# Patient Record
Sex: MALE | Race: WHITE | URBAN - NONMETROPOLITAN AREA
[De-identification: names, ages, dates, MRNs, and addresses within clinical notes are randomized per-mention and may not be internally consistent; named-entity substitution may affect disease eponyms.]

---

## 2017-01-31 ENCOUNTER — OFFICE VISIT (OUTPATIENT)
Dept: GASTROENTEROLOGY | Age: 77
End: 2017-01-31
Payer: COMMERCIAL

## 2017-01-31 VITALS
WEIGHT: 203.4 LBS | HEIGHT: 71 IN | SYSTOLIC BLOOD PRESSURE: 118 MMHG | HEART RATE: 77 BPM | DIASTOLIC BLOOD PRESSURE: 76 MMHG | OXYGEN SATURATION: 98 % | BODY MASS INDEX: 28.48 KG/M2

## 2017-01-31 DIAGNOSIS — Z85.46 HISTORY OF PROSTATE CANCER: Primary | ICD-10-CM

## 2017-01-31 DIAGNOSIS — Z80.0 FAMILY HISTORY OF ESOPHAGEAL CANCER: ICD-10-CM

## 2017-01-31 PROCEDURE — 99202 OFFICE O/P NEW SF 15 MIN: CPT | Performed by: NURSE PRACTITIONER

## 2017-01-31 RX ORDER — DOXYCYCLINE HYCLATE 50 MG/1
50 CAPSULE ORAL 2 TIMES DAILY
COMMUNITY

## 2017-01-31 RX ORDER — FENOFIBRATE 145 MG/1
145 TABLET, COATED ORAL DAILY
COMMUNITY

## 2017-01-31 RX ORDER — HYDROCHLOROTHIAZIDE 25 MG/1
25 TABLET ORAL DAILY
COMMUNITY

## 2017-01-31 RX ORDER — LISINOPRIL 20 MG/1
20 TABLET ORAL DAILY
COMMUNITY

## 2017-01-31 ASSESSMENT — ENCOUNTER SYMPTOMS
CONSTIPATION: 0
NAUSEA: 0
ABDOMINAL PAIN: 0
VOMITING: 0
BLOOD IN STOOL: 0
TROUBLE SWALLOWING: 0
DIARRHEA: 0
COUGH: 0
RECTAL PAIN: 0
CHEST TIGHTNESS: 0

## 2017-04-09 ENCOUNTER — HOSPITAL ENCOUNTER (EMERGENCY)
Facility: HOSPITAL | Age: 77
Discharge: HOME | End: 2017-04-09
Attending: EMERGENCY MEDICINE | Admitting: EMERGENCY MEDICINE

## 2017-04-09 ENCOUNTER — APPOINTMENT (OUTPATIENT)
Dept: CT IMAGING | Facility: HOSPITAL | Age: 77
End: 2017-04-09

## 2017-04-09 ENCOUNTER — APPOINTMENT (OUTPATIENT)
Dept: GENERAL RADIOLOGY | Facility: HOSPITAL | Age: 77
End: 2017-04-09

## 2017-04-09 VITALS
HEART RATE: 69 BPM | WEIGHT: 196 LBS | DIASTOLIC BLOOD PRESSURE: 96 MMHG | SYSTOLIC BLOOD PRESSURE: 138 MMHG | HEIGHT: 71 IN | RESPIRATION RATE: 16 BRPM | OXYGEN SATURATION: 96 % | TEMPERATURE: 97.6 F | BODY MASS INDEX: 27.44 KG/M2

## 2017-04-09 DIAGNOSIS — I10 ESSENTIAL HYPERTENSION: ICD-10-CM

## 2017-04-09 DIAGNOSIS — M19.012 OSTEOARTHRITIS OF LEFT SHOULDER, UNSPECIFIED OSTEOARTHRITIS TYPE: Primary | ICD-10-CM

## 2017-04-09 LAB
ALBUMIN SERPL-MCNC: 4.3 G/DL (ref 3.5–5)
ALBUMIN/GLOB SERPL: 1.4 G/DL (ref 1.1–2.5)
ALP SERPL-CCNC: 54 U/L (ref 24–120)
ALT SERPL W P-5'-P-CCNC: 22 U/L (ref 0–54)
ANION GAP SERPL CALCULATED.3IONS-SCNC: 15 MMOL/L (ref 4–13)
APTT PPP: 32.7 SECONDS (ref 24.1–34.8)
AST SERPL-CCNC: 36 U/L (ref 7–45)
BASOPHILS # BLD AUTO: 0.03 10*3/MM3 (ref 0–0.2)
BASOPHILS NFR BLD AUTO: 0.4 % (ref 0–2)
BILIRUB SERPL-MCNC: 0.6 MG/DL (ref 0.1–1)
BUN BLD-MCNC: 21 MG/DL (ref 5–21)
BUN/CREAT SERPL: 19.1 (ref 7–25)
CALCIUM SPEC-SCNC: 9.8 MG/DL (ref 8.4–10.4)
CHLORIDE SERPL-SCNC: 98 MMOL/L (ref 98–110)
CK MB SERPL-CCNC: 4.15 NG/ML (ref 0–5)
CO2 SERPL-SCNC: 26 MMOL/L (ref 24–31)
CREAT BLD-MCNC: 1.1 MG/DL (ref 0.5–1.4)
D DIMER PPP FEU-MCNC: 0.32 MG/L (FEU) (ref 0–0.5)
DEPRECATED RDW RBC AUTO: 39.1 FL (ref 40–54)
EOSINOPHIL # BLD AUTO: 0.12 10*3/MM3 (ref 0–0.7)
EOSINOPHIL NFR BLD AUTO: 1.8 % (ref 0–4)
ERYTHROCYTE [DISTWIDTH] IN BLOOD BY AUTOMATED COUNT: 13.1 % (ref 12–15)
GFR SERPL CREATININE-BSD FRML MDRD: 65 ML/MIN/1.73
GLOBULIN UR ELPH-MCNC: 3.1 GM/DL
GLUCOSE BLD-MCNC: 109 MG/DL (ref 70–100)
HCT VFR BLD AUTO: 41.5 % (ref 40–52)
HGB BLD-MCNC: 14.8 G/DL (ref 14–18)
HOLD SPECIMEN: NORMAL
HOLD SPECIMEN: NORMAL
IMM GRANULOCYTES # BLD: 0.05 10*3/MM3 (ref 0–0.03)
IMM GRANULOCYTES NFR BLD: 0.7 % (ref 0–5)
INR PPP: 0.84 (ref 0.91–1.09)
LYMPHOCYTES # BLD AUTO: 2.34 10*3/MM3 (ref 0.72–4.86)
LYMPHOCYTES NFR BLD AUTO: 34.9 % (ref 15–45)
MAGNESIUM SERPL-MCNC: 1.6 MG/DL (ref 1.4–2.2)
MCH RBC QN AUTO: 30.1 PG (ref 28–32)
MCHC RBC AUTO-ENTMCNC: 35.7 G/DL (ref 33–36)
MCV RBC AUTO: 84.3 FL (ref 82–95)
MONOCYTES # BLD AUTO: 0.51 10*3/MM3 (ref 0.19–1.3)
MONOCYTES NFR BLD AUTO: 7.6 % (ref 4–12)
MYOGLOBIN SERPL-MCNC: 54.2 NG/ML (ref 0–110)
NEUTROPHILS # BLD AUTO: 3.65 10*3/MM3 (ref 1.87–8.4)
NEUTROPHILS NFR BLD AUTO: 54.6 % (ref 39–78)
NT-PROBNP SERPL-MCNC: 45.9 PG/ML (ref 0–1800)
PLATELET # BLD AUTO: 218 10*3/MM3 (ref 130–400)
PMV BLD AUTO: 11 FL (ref 6–12)
POTASSIUM BLD-SCNC: 4 MMOL/L (ref 3.5–5.3)
PROT SERPL-MCNC: 7.4 G/DL (ref 6.3–8.7)
PROTHROMBIN TIME: 11.8 SECONDS (ref 11.9–14.6)
RBC # BLD AUTO: 4.92 10*6/MM3 (ref 4.8–5.9)
SODIUM BLD-SCNC: 139 MMOL/L (ref 135–145)
TROPONIN I SERPL-MCNC: 0 NG/ML (ref 0–0.07)
TROPONIN I SERPL-MCNC: <0.012 NG/ML (ref 0–0.03)
WBC NRBC COR # BLD: 6.7 10*3/MM3 (ref 4.8–10.8)
WHOLE BLOOD HOLD SPECIMEN: NORMAL
WHOLE BLOOD HOLD SPECIMEN: NORMAL

## 2017-04-09 PROCEDURE — 85610 PROTHROMBIN TIME: CPT | Performed by: EMERGENCY MEDICINE

## 2017-04-09 PROCEDURE — 85025 COMPLETE CBC W/AUTO DIFF WBC: CPT | Performed by: EMERGENCY MEDICINE

## 2017-04-09 PROCEDURE — 70450 CT HEAD/BRAIN W/O DYE: CPT

## 2017-04-09 PROCEDURE — 80053 COMPREHEN METABOLIC PANEL: CPT | Performed by: EMERGENCY MEDICINE

## 2017-04-09 PROCEDURE — 93005 ELECTROCARDIOGRAM TRACING: CPT | Performed by: EMERGENCY MEDICINE

## 2017-04-09 PROCEDURE — 71010 HC CHEST PA OR AP: CPT

## 2017-04-09 PROCEDURE — 83880 ASSAY OF NATRIURETIC PEPTIDE: CPT | Performed by: EMERGENCY MEDICINE

## 2017-04-09 PROCEDURE — 83874 ASSAY OF MYOGLOBIN: CPT | Performed by: EMERGENCY MEDICINE

## 2017-04-09 PROCEDURE — 82553 CREATINE MB FRACTION: CPT | Performed by: EMERGENCY MEDICINE

## 2017-04-09 PROCEDURE — 85730 THROMBOPLASTIN TIME PARTIAL: CPT | Performed by: EMERGENCY MEDICINE

## 2017-04-09 PROCEDURE — 36415 COLL VENOUS BLD VENIPUNCTURE: CPT

## 2017-04-09 PROCEDURE — 73030 X-RAY EXAM OF SHOULDER: CPT

## 2017-04-09 PROCEDURE — 83735 ASSAY OF MAGNESIUM: CPT | Performed by: EMERGENCY MEDICINE

## 2017-04-09 PROCEDURE — 93010 ELECTROCARDIOGRAM REPORT: CPT | Performed by: INTERNAL MEDICINE

## 2017-04-09 PROCEDURE — 99284 EMERGENCY DEPT VISIT MOD MDM: CPT

## 2017-04-09 PROCEDURE — 84484 ASSAY OF TROPONIN QUANT: CPT

## 2017-04-09 PROCEDURE — 93005 ELECTROCARDIOGRAM TRACING: CPT

## 2017-04-09 PROCEDURE — 84484 ASSAY OF TROPONIN QUANT: CPT | Performed by: EMERGENCY MEDICINE

## 2017-04-09 PROCEDURE — 85379 FIBRIN DEGRADATION QUANT: CPT | Performed by: EMERGENCY MEDICINE

## 2017-04-09 RX ORDER — FENOFIBRATE 145 MG/1
145 TABLET, COATED ORAL DAILY
COMMUNITY

## 2017-04-09 RX ORDER — HYDROCHLOROTHIAZIDE 25 MG/1
25 TABLET ORAL DAILY
COMMUNITY

## 2017-04-09 RX ORDER — CLONIDINE HYDROCHLORIDE 0.1 MG/1
0.1 TABLET ORAL 2 TIMES DAILY
Qty: 20 TABLET | Refills: 0 | Status: SHIPPED | OUTPATIENT
Start: 2017-04-09 | End: 2022-06-20

## 2017-04-09 RX ORDER — DOXYCYCLINE HYCLATE 50 MG/1
TABLET, FILM COATED ORAL
COMMUNITY
End: 2022-06-20

## 2017-04-09 RX ORDER — LABETALOL HYDROCHLORIDE 5 MG/ML
10 INJECTION, SOLUTION INTRAVENOUS ONCE
Status: DISCONTINUED | OUTPATIENT
Start: 2017-04-09 | End: 2017-04-09 | Stop reason: HOSPADM

## 2017-04-09 RX ORDER — LISINOPRIL 40 MG/1
40 TABLET ORAL DAILY
COMMUNITY

## 2017-04-09 RX ORDER — CLONIDINE HYDROCHLORIDE 0.1 MG/1
0.1 TABLET ORAL ONCE
Status: DISCONTINUED | OUTPATIENT
Start: 2017-04-09 | End: 2017-04-09 | Stop reason: HOSPADM

## 2017-04-09 NOTE — ED PROVIDER NOTES
Subjective   HPI Comments: Patient is a 76-year-old male with history of hypertension (but denies history of heart disease) who reports he has had intermittent entire left arm tingling for approximately one week.  Patient reports is worsened by nothing and relieved by nothing.  Patient reports he was seen at his primary care provider's office this past Thursday (04/06/17) for sinus infection but did not discuss the intermittent left arm tingling.  Patient denies any chest pain, shortness of breath, nausea, vomiting, diaphoresis, slurred speech, difficulty swallowing, acute visual changes or any focal weakness.  Patient is concerned that he may have heart disease.      History provided by:  Patient      Review of Systems   Constitutional: Negative.    HENT: Negative.    Eyes: Negative.    Respiratory: Negative.    Cardiovascular: Negative.    Gastrointestinal: Negative.    Endocrine: Negative.    Genitourinary: Negative.    Musculoskeletal: Negative.    Skin: Negative.    Allergic/Immunologic: Negative.    Neurological: Negative.         Left arm tingling   Hematological: Negative.    Psychiatric/Behavioral: Negative.    All other systems reviewed and are negative.      Past Medical History:   Diagnosis Date   • Cancer    • Hypertension        Allergies   Allergen Reactions   • Amoxicillin    • Mobic [Meloxicam]        Past Surgical History:   Procedure Laterality Date   • HEMORRHOIDECTOMY         History reviewed. No pertinent family history.    Social History     Social History   • Marital status:      Spouse name: N/A   • Number of children: N/A   • Years of education: N/A     Social History Main Topics   • Smoking status: Never Smoker   • Smokeless tobacco: None   • Alcohol use No   • Drug use: No   • Sexual activity: Defer     Other Topics Concern   • None     Social History Narrative   • None           Objective   Physical Exam   Constitutional: He is oriented to person, place, and time. He appears  well-developed and well-nourished.   HENT:   Head: Normocephalic and atraumatic.   Right Ear: External ear normal.   Left Ear: External ear normal.   Nose: Nose normal.   Mouth/Throat: Oropharynx is clear and moist.   Mild hard of hearing.   Eyes: Conjunctivae and EOM are normal. Pupils are equal, round, and reactive to light. Right eye exhibits no discharge. Left eye exhibits no discharge.   Neck: Normal range of motion. Neck supple. No tracheal deviation present. No thyromegaly present.   Cardiovascular: Normal rate, regular rhythm, normal heart sounds and intact distal pulses.    No murmur heard.  Pulmonary/Chest: Effort normal and breath sounds normal. No respiratory distress. He exhibits no tenderness.   Abdominal: Soft. He exhibits no distension. There is no tenderness.   Musculoskeletal: Normal range of motion. He exhibits no edema, tenderness or deformity.   Neurological: He is alert and oriented to person, place, and time. No cranial nerve deficit.   Skin: Skin is warm and dry. No erythema. No pallor.   Psychiatric: He has a normal mood and affect. Judgment normal.   Nursing note and vitals reviewed.      ECG 12 Lead    Date/Time: 4/9/2017 10:58 AM  Performed by: STEPHANIE RIVERA  Authorized by: STEPHANIE RIVERA   Interpreted by physician  Rhythm: sinus rhythm  BPM: 74  Conduction: 1st degree               ED Course  ED Course                  MDM  Number of Diagnoses or Management Options  Essential hypertension: established and worsening  Osteoarthritis of left shoulder, unspecified osteoarthritis type: new and requires workup     Amount and/or Complexity of Data Reviewed  Clinical lab tests: ordered and reviewed  Tests in the radiology section of CPT®: ordered and reviewed    Risk of Complications, Morbidity, and/or Mortality  Presenting problems: moderate  Diagnostic procedures: moderate  Management options: low    Patient Progress  Patient progress: stable      Final diagnoses:    Osteoarthritis of left shoulder, unspecified osteoarthritis type   Essential hypertension            John Yusuf MD  04/09/17 1400

## 2022-06-14 ENCOUNTER — APPOINTMENT (OUTPATIENT)
Dept: CT IMAGING | Facility: HOSPITAL | Age: 82
End: 2022-06-14

## 2022-06-14 ENCOUNTER — APPOINTMENT (OUTPATIENT)
Dept: GENERAL RADIOLOGY | Facility: HOSPITAL | Age: 82
End: 2022-06-14

## 2022-06-14 ENCOUNTER — HOSPITAL ENCOUNTER (EMERGENCY)
Facility: HOSPITAL | Age: 82
Discharge: HOME | End: 2022-06-14
Admitting: EMERGENCY MEDICINE

## 2022-06-14 VITALS
SYSTOLIC BLOOD PRESSURE: 186 MMHG | BODY MASS INDEX: 25.9 KG/M2 | HEART RATE: 77 BPM | OXYGEN SATURATION: 96 % | DIASTOLIC BLOOD PRESSURE: 105 MMHG | HEIGHT: 71 IN | WEIGHT: 185 LBS | TEMPERATURE: 97.6 F | RESPIRATION RATE: 20 BRPM

## 2022-06-14 DIAGNOSIS — G89.29 CHRONIC THORACIC BACK PAIN, UNSPECIFIED BACK PAIN LATERALITY: ICD-10-CM

## 2022-06-14 DIAGNOSIS — M54.6 CHRONIC THORACIC BACK PAIN, UNSPECIFIED BACK PAIN LATERALITY: ICD-10-CM

## 2022-06-14 DIAGNOSIS — R07.9 CHEST PAIN, UNSPECIFIED TYPE: Primary | ICD-10-CM

## 2022-06-14 DIAGNOSIS — I71.20 THORACIC AORTIC ANEURYSM WITHOUT RUPTURE: ICD-10-CM

## 2022-06-14 DIAGNOSIS — E04.1 THYROID NODULE: ICD-10-CM

## 2022-06-14 LAB
ALBUMIN SERPL-MCNC: 4.7 G/DL (ref 3.5–5.2)
ALBUMIN/GLOB SERPL: 1.7 G/DL
ALP SERPL-CCNC: 58 U/L (ref 39–117)
ALT SERPL W P-5'-P-CCNC: 17 U/L (ref 1–41)
ANION GAP SERPL CALCULATED.3IONS-SCNC: 12 MMOL/L (ref 5–15)
AST SERPL-CCNC: 22 U/L (ref 1–40)
BASOPHILS # BLD AUTO: 0.03 10*3/MM3 (ref 0–0.2)
BASOPHILS NFR BLD AUTO: 0.3 % (ref 0–1.5)
BILIRUB SERPL-MCNC: 0.6 MG/DL (ref 0–1.2)
BUN SERPL-MCNC: 20 MG/DL (ref 8–23)
BUN/CREAT SERPL: 17.7 (ref 7–25)
CALCIUM SPEC-SCNC: 9.9 MG/DL (ref 8.6–10.5)
CHLORIDE SERPL-SCNC: 97 MMOL/L (ref 98–107)
CO2 SERPL-SCNC: 25 MMOL/L (ref 22–29)
CREAT SERPL-MCNC: 1.13 MG/DL (ref 0.76–1.27)
DEPRECATED RDW RBC AUTO: 44 FL (ref 37–54)
EGFRCR SERPLBLD CKD-EPI 2021: 65.3 ML/MIN/1.73
EOSINOPHIL # BLD AUTO: 0.28 10*3/MM3 (ref 0–0.4)
EOSINOPHIL NFR BLD AUTO: 2.7 % (ref 0.3–6.2)
ERYTHROCYTE [DISTWIDTH] IN BLOOD BY AUTOMATED COUNT: 13.7 % (ref 12.3–15.4)
GLOBULIN UR ELPH-MCNC: 2.7 GM/DL
GLUCOSE SERPL-MCNC: 130 MG/DL (ref 65–99)
HCT VFR BLD AUTO: 42.5 % (ref 37.5–51)
HGB BLD-MCNC: 14.2 G/DL (ref 13–17.7)
HOLD SPECIMEN: NORMAL
HOLD SPECIMEN: NORMAL
IMM GRANULOCYTES # BLD AUTO: 0.06 10*3/MM3 (ref 0–0.05)
IMM GRANULOCYTES NFR BLD AUTO: 0.6 % (ref 0–0.5)
INR PPP: 1.05 (ref 0.91–1.09)
LIPASE SERPL-CCNC: 60 U/L (ref 13–60)
LYMPHOCYTES # BLD AUTO: 2.38 10*3/MM3 (ref 0.7–3.1)
LYMPHOCYTES NFR BLD AUTO: 22.8 % (ref 19.6–45.3)
MCH RBC QN AUTO: 29.4 PG (ref 26.6–33)
MCHC RBC AUTO-ENTMCNC: 33.4 G/DL (ref 31.5–35.7)
MCV RBC AUTO: 88 FL (ref 79–97)
MONOCYTES # BLD AUTO: 0.63 10*3/MM3 (ref 0.1–0.9)
MONOCYTES NFR BLD AUTO: 6 % (ref 5–12)
NEUTROPHILS NFR BLD AUTO: 67.6 % (ref 42.7–76)
NEUTROPHILS NFR BLD AUTO: 7.08 10*3/MM3 (ref 1.7–7)
NRBC BLD AUTO-RTO: 0 /100 WBC (ref 0–0.2)
PLATELET # BLD AUTO: 310 10*3/MM3 (ref 140–450)
PMV BLD AUTO: 10.1 FL (ref 6–12)
POTASSIUM SERPL-SCNC: 3.6 MMOL/L (ref 3.5–5.2)
PROT SERPL-MCNC: 7.4 G/DL (ref 6–8.5)
PROTHROMBIN TIME: 13.2 SECONDS (ref 11.9–14.6)
RBC # BLD AUTO: 4.83 10*6/MM3 (ref 4.14–5.8)
SODIUM SERPL-SCNC: 134 MMOL/L (ref 136–145)
TROPONIN T SERPL-MCNC: <0.01 NG/ML (ref 0–0.03)
TROPONIN T SERPL-MCNC: <0.01 NG/ML (ref 0–0.03)
WBC NRBC COR # BLD: 10.46 10*3/MM3 (ref 3.4–10.8)
WHOLE BLOOD HOLD COAG: NORMAL
WHOLE BLOOD HOLD SPECIMEN: NORMAL

## 2022-06-14 PROCEDURE — 71045 X-RAY EXAM CHEST 1 VIEW: CPT

## 2022-06-14 PROCEDURE — 93005 ELECTROCARDIOGRAM TRACING: CPT

## 2022-06-14 PROCEDURE — 25010000002 MORPHINE SULFATE (PF) 2 MG/ML SOLUTION: Performed by: NURSE PRACTITIONER

## 2022-06-14 PROCEDURE — 25010000002 ONDANSETRON PER 1 MG: Performed by: EMERGENCY MEDICINE

## 2022-06-14 PROCEDURE — 83690 ASSAY OF LIPASE: CPT | Performed by: NURSE PRACTITIONER

## 2022-06-14 PROCEDURE — 25010000002 MORPHINE SULFATE (PF) 2 MG/ML SOLUTION: Performed by: EMERGENCY MEDICINE

## 2022-06-14 PROCEDURE — 85610 PROTHROMBIN TIME: CPT | Performed by: NURSE PRACTITIONER

## 2022-06-14 PROCEDURE — 93010 ELECTROCARDIOGRAM REPORT: CPT | Performed by: EMERGENCY MEDICINE

## 2022-06-14 PROCEDURE — 96375 TX/PRO/DX INJ NEW DRUG ADDON: CPT

## 2022-06-14 PROCEDURE — 84484 ASSAY OF TROPONIN QUANT: CPT

## 2022-06-14 PROCEDURE — 80053 COMPREHEN METABOLIC PANEL: CPT

## 2022-06-14 PROCEDURE — 36415 COLL VENOUS BLD VENIPUNCTURE: CPT

## 2022-06-14 PROCEDURE — 72128 CT CHEST SPINE W/O DYE: CPT

## 2022-06-14 PROCEDURE — 96374 THER/PROPH/DIAG INJ IV PUSH: CPT

## 2022-06-14 PROCEDURE — 85025 COMPLETE CBC W/AUTO DIFF WBC: CPT

## 2022-06-14 PROCEDURE — 96376 TX/PRO/DX INJ SAME DRUG ADON: CPT

## 2022-06-14 PROCEDURE — 25010000002 ONDANSETRON PER 1 MG: Performed by: NURSE PRACTITIONER

## 2022-06-14 PROCEDURE — 99284 EMERGENCY DEPT VISIT MOD MDM: CPT

## 2022-06-14 RX ORDER — ONDANSETRON 2 MG/ML
4 INJECTION INTRAMUSCULAR; INTRAVENOUS ONCE
Status: COMPLETED | OUTPATIENT
Start: 2022-06-14 | End: 2022-06-14

## 2022-06-14 RX ORDER — ASPIRIN 81 MG/1
324 TABLET, CHEWABLE ORAL ONCE
Status: COMPLETED | OUTPATIENT
Start: 2022-06-14 | End: 2022-06-14

## 2022-06-14 RX ORDER — SODIUM CHLORIDE 0.9 % (FLUSH) 0.9 %
10 SYRINGE (ML) INJECTION AS NEEDED
Status: DISCONTINUED | OUTPATIENT
Start: 2022-06-14 | End: 2022-06-14 | Stop reason: HOSPADM

## 2022-06-14 RX ORDER — MORPHINE SULFATE 2 MG/ML
2 INJECTION, SOLUTION INTRAMUSCULAR; INTRAVENOUS ONCE
Status: COMPLETED | OUTPATIENT
Start: 2022-06-14 | End: 2022-06-14

## 2022-06-14 RX ADMIN — MORPHINE SULFATE 2 MG: 2 INJECTION, SOLUTION INTRAMUSCULAR; INTRAVENOUS at 20:43

## 2022-06-14 RX ADMIN — MORPHINE SULFATE 2 MG: 2 INJECTION, SOLUTION INTRAMUSCULAR; INTRAVENOUS at 18:40

## 2022-06-14 RX ADMIN — ASPIRIN 324 MG: 81 TABLET, CHEWABLE ORAL at 17:28

## 2022-06-14 RX ADMIN — ONDANSETRON 4 MG: 2 INJECTION INTRAMUSCULAR; INTRAVENOUS at 20:43

## 2022-06-14 RX ADMIN — ONDANSETRON 4 MG: 2 INJECTION INTRAMUSCULAR; INTRAVENOUS at 18:37

## 2022-06-14 NOTE — ED PROVIDER NOTES
Subjective   81 yom with PMH of H presents with c/o chest pain.  He states he has chronic mid back pain. He reports it has worsened the last five weeks.  He was seen at his chiropractors office for three of the five weeks.  He reports having an xray and told he was 'out of alignment.'  He doesn't feel he is improving so he saw his PCP (Karen).  He states Dr Jones has referred him to Dr Hartman with pain management.  He states the last week he has been having intermittent chest pain.  He states it is 'all over' his chest.  It does not radiate to his back.  He denies SOB.  He denies abdomen pain.  He denies n/v/d or reflux. He denies weakness or dizziness.  He has no numbness or tingling of the extremities. He states he had been given meloxicam and he is allergic to mobic.  He has been told the chest pain may be related to the meloxicam but he wants his heart evaluated before he sees anyone with pain management.           Review of Systems   Constitutional: Negative for activity change, appetite change, fatigue and fever.   HENT: Negative for congestion, ear pain, facial swelling and sore throat.    Eyes: Negative for discharge and visual disturbance.   Respiratory: Negative for apnea, chest tightness, shortness of breath, wheezing and stridor.    Cardiovascular: Positive for chest pain. Negative for palpitations.   Gastrointestinal: Negative for abdominal distention, abdominal pain, diarrhea, nausea and vomiting.   Genitourinary: Negative for difficulty urinating and dysuria.   Musculoskeletal: Negative for arthralgias and myalgias.   Skin: Negative for rash and wound.   Neurological: Negative for dizziness and seizures.   Psychiatric/Behavioral: Negative for agitation and confusion.       Past Medical History:   Diagnosis Date   • Cancer (HCC)    • Hypertension        Allergies   Allergen Reactions   • Amoxicillin Itching   • Mobic [Meloxicam] Palpitations       Past Surgical History:   Procedure Laterality Date    • ENDOSCOPY N/A 6/20/2022    Procedure: ESOPHAGOGASTRODUODENOSCOPY WITH ANESTHESIA;  Surgeon: Rene Brice MD;  Location: [facility] ENDOSCOPY;  Service: Gastroenterology;  Laterality: N/A;  pre anemia   post duodenal ulcer   Dr. Sam   • EXPLORATORY LAPAROTOMY N/A 6/20/2022    Procedure: LAPAROTOMY EXPLORATORY;  Surgeon: Nancy Park MD;  Location: [facility] OR;  Service: General;  Laterality: N/A;   • HEMORRHOIDECTOMY         History reviewed. No pertinent family history.    Social History     Socioeconomic History   • Marital status:    Tobacco Use   • Smoking status: Never Smoker   • Smokeless tobacco: Never Used   Substance and Sexual Activity   • Alcohol use: No   • Drug use: No   • Sexual activity: Defer           Objective   Physical Exam  Vitals and nursing note reviewed.   Constitutional:       Appearance: He is well-developed.   HENT:      Head: Normocephalic.   Eyes:      Pupils: Pupils are equal, round, and reactive to light.   Cardiovascular:      Rate and Rhythm: Normal rate and regular rhythm.      Heart sounds: No murmur heard.  Pulmonary:      Effort: Pulmonary effort is normal.      Breath sounds: Normal breath sounds.   Abdominal:      General: Bowel sounds are normal.      Palpations: Abdomen is soft.   Musculoskeletal:         General: Normal range of motion.      Cervical back: Normal range of motion and neck supple.      Thoracic back: No swelling, edema, spasms or tenderness. Normal range of motion.      Comments: No tenderness or deformity to thoracic spine.  He has +PMS of all extremities    Skin:     General: Skin is warm and dry.   Neurological:      Mental Status: He is alert and oriented to person, place, and time.         Procedures           ED Course  ED Course as of 06/28/22 0546   Tue Jun 14, 2022 2027 Chest xray - IMPRESSION: 1. No radiographic evidence of acute cardiopulmonary process.  CT thoracic spine - IMPRESSION: 1. No acute osseous injury in the thoracic  spine. 2. Moderate multilevel degenerative change, as described above. 3. Bulky bridging anterior spinal osteophytes. 4. 3 cm right lobe thyroid nodule for which dedicated thyroid ultrasound  would be recommended if not previously performed. 5. Mild fusiform aneurysmal dilation of the ascending thoracic aorta, measuring up to 4.2 cm in greatest axial diameter.  He has had two negative troponin.  Other blood work is unremarkable.  I reviewed the patient's history, assessment and results with Dr Perez.  He will be dc'd home to f/u with PCP.  I then discussed the patient's results and recommendations with the patient.  He states he is continuing to have back pain.  I will order him a dose of pain medication prior to discharge.  He voiced understanding of all results, including CT of the thoracic spine, and instructions including recommendation for cardiac stress test. [KS]      ED Course User Index  [KS] Nav Soto, ERIC                                                 MDM  Number of Diagnoses or Management Options  Chest pain, unspecified type: new and requires workup  Chronic thoracic back pain, unspecified back pain laterality: established and improving     Amount and/or Complexity of Data Reviewed  Clinical lab tests: ordered and reviewed  Tests in the radiology section of CPT®: ordered and reviewed  Discuss the patient with other providers: yes    Risk of Complications, Morbidity, and/or Mortality  Presenting problems: low  Diagnostic procedures: low  Management options: low    Patient Progress  Patient progress: stable      Final diagnoses:   Chest pain, unspecified type   Thyroid nodule   Thoracic aortic aneurysm without rupture (HCC)   Chronic thoracic back pain, unspecified back pain laterality       ED Disposition  ED Disposition     ED Disposition   Discharge    Condition   Stable    Comment   --             Tenzin Sam MD  [address] [place] [address]  [id number]  [phone]    Call in 1 day  Routine ED follow up         Medication List      No changes were made to your prescriptions during this visit.          Nav Soto, APRN  06/28/22 0592

## 2022-06-15 LAB
QT INTERVAL: 342 MS
QT INTERVAL: 362 MS
QTC INTERVAL: 401 MS
QTC INTERVAL: 425 MS

## 2022-06-15 NOTE — DISCHARGE INSTRUCTIONS
Drink plenty of fluid.  Continue home medication.  Follow up with PCP - call tomorrow for appointment. Discuss cardiac stress test as well as thoracic aortic aneurysm.  Return to ED if condition does not improve or worsens

## 2022-06-19 ENCOUNTER — HOSPITAL ENCOUNTER (INPATIENT)
Facility: HOSPITAL | Age: 82
LOS: 16 days | Discharge: HOME | End: 2022-07-06
Attending: EMERGENCY MEDICINE | Admitting: FAMILY MEDICINE

## 2022-06-19 DIAGNOSIS — I95.9 HYPOTENSION, UNSPECIFIED HYPOTENSION TYPE: Primary | ICD-10-CM

## 2022-06-19 DIAGNOSIS — Z78.9 DECREASED ACTIVITIES OF DAILY LIVING (ADL): ICD-10-CM

## 2022-06-19 DIAGNOSIS — Z74.09 IMPAIRED MOBILITY: ICD-10-CM

## 2022-06-19 DIAGNOSIS — R13.10 DYSPHAGIA, UNSPECIFIED TYPE: ICD-10-CM

## 2022-06-19 PROCEDURE — 99285 EMERGENCY DEPT VISIT HI MDM: CPT

## 2022-06-20 ENCOUNTER — APPOINTMENT (OUTPATIENT)
Dept: CT IMAGING | Facility: HOSPITAL | Age: 82
End: 2022-06-20

## 2022-06-20 ENCOUNTER — ANESTHESIA (OUTPATIENT)
Dept: PERIOP | Facility: HOSPITAL | Age: 82
End: 2022-06-20

## 2022-06-20 ENCOUNTER — APPOINTMENT (OUTPATIENT)
Dept: GENERAL RADIOLOGY | Facility: HOSPITAL | Age: 82
End: 2022-06-20

## 2022-06-20 ENCOUNTER — ANESTHESIA EVENT (OUTPATIENT)
Dept: GASTROENTEROLOGY | Facility: HOSPITAL | Age: 82
End: 2022-06-20

## 2022-06-20 ENCOUNTER — ANESTHESIA EVENT (OUTPATIENT)
Dept: PERIOP | Facility: HOSPITAL | Age: 82
End: 2022-06-20

## 2022-06-20 ENCOUNTER — ANESTHESIA (OUTPATIENT)
Dept: GASTROENTEROLOGY | Facility: HOSPITAL | Age: 82
End: 2022-06-20

## 2022-06-20 PROBLEM — K26.4 BLEEDING DUODENAL ULCER: Status: ACTIVE | Noted: 2022-06-20

## 2022-06-20 PROBLEM — I95.9 HYPOTENSION: Status: ACTIVE | Noted: 2022-06-20

## 2022-06-20 LAB
A-A DO2: 290.8 MMHG
ABO GROUP BLD: NORMAL
ABO GROUP BLD: NORMAL
ALBUMIN SERPL-MCNC: 2.9 G/DL (ref 3.5–5.2)
ALBUMIN SERPL-MCNC: 3 G/DL (ref 3.5–5.2)
ALBUMIN SERPL-MCNC: 3.2 G/DL (ref 3.5–5.2)
ALBUMIN/GLOB SERPL: 1.5 G/DL
ALBUMIN/GLOB SERPL: 1.7 G/DL
ALBUMIN/GLOB SERPL: 1.9 G/DL
ALP SERPL-CCNC: 34 U/L (ref 39–117)
ALP SERPL-CCNC: 34 U/L (ref 39–117)
ALP SERPL-CCNC: 42 U/L (ref 39–117)
ALT SERPL W P-5'-P-CCNC: 12 U/L (ref 1–41)
ALT SERPL W P-5'-P-CCNC: 20 U/L (ref 1–41)
ALT SERPL W P-5'-P-CCNC: 9 U/L (ref 1–41)
ANION GAP SERPL CALCULATED.3IONS-SCNC: 5 MMOL/L (ref 5–15)
ANION GAP SERPL CALCULATED.3IONS-SCNC: 7 MMOL/L (ref 5–15)
ANION GAP SERPL CALCULATED.3IONS-SCNC: 8 MMOL/L (ref 5–15)
ARTERIAL PATENCY WRIST A: ABNORMAL
ARTERIAL PATENCY WRIST A: ABNORMAL
AST SERPL-CCNC: 13 U/L (ref 1–40)
AST SERPL-CCNC: 19 U/L (ref 1–40)
AST SERPL-CCNC: 20 U/L (ref 1–40)
ATMOSPHERIC PRESS: 754 MMHG
ATMOSPHERIC PRESS: 754 MMHG
BASE EXCESS BLDA CALC-SCNC: -1.4 MMOL/L (ref 0–2)
BASE EXCESS BLDA CALC-SCNC: -3.7 MMOL/L (ref 0–2)
BASOPHILS # BLD AUTO: 0.03 10*3/MM3 (ref 0–0.2)
BASOPHILS # BLD AUTO: 0.03 10*3/MM3 (ref 0–0.2)
BASOPHILS # BLD AUTO: 0.06 10*3/MM3 (ref 0–0.2)
BASOPHILS NFR BLD AUTO: 0.2 % (ref 0–1.5)
BASOPHILS NFR BLD AUTO: 0.2 % (ref 0–1.5)
BASOPHILS NFR BLD AUTO: 0.3 % (ref 0–1.5)
BDY SITE: ABNORMAL
BDY SITE: ABNORMAL
BILIRUB SERPL-MCNC: 0.3 MG/DL (ref 0–1.2)
BILIRUB SERPL-MCNC: 0.4 MG/DL (ref 0–1.2)
BILIRUB SERPL-MCNC: 0.5 MG/DL (ref 0–1.2)
BILIRUB UR QL STRIP: NEGATIVE
BLD GP AB SCN SERPL QL: NEGATIVE
BODY TEMPERATURE: 36 C
BODY TEMPERATURE: 37 C
BUN SERPL-MCNC: 46 MG/DL (ref 8–23)
BUN SERPL-MCNC: 60 MG/DL (ref 8–23)
BUN SERPL-MCNC: 60 MG/DL (ref 8–23)
BUN/CREAT SERPL: 31.3 (ref 7–25)
BUN/CREAT SERPL: 54.1 (ref 7–25)
BUN/CREAT SERPL: 59.4 (ref 7–25)
CA-I BLD-MCNC: 4.03 MG/DL (ref 4.6–5.4)
CALCIUM SPEC-SCNC: 7 MG/DL (ref 8.6–10.5)
CALCIUM SPEC-SCNC: 8.4 MG/DL (ref 8.6–10.5)
CALCIUM SPEC-SCNC: 8.5 MG/DL (ref 8.6–10.5)
CHLORIDE SERPL-SCNC: 100 MMOL/L (ref 98–107)
CHLORIDE SERPL-SCNC: 105 MMOL/L (ref 98–107)
CHLORIDE SERPL-SCNC: 108 MMOL/L (ref 98–107)
CLARITY UR: CLEAR
CO2 SERPL-SCNC: 24 MMOL/L (ref 22–29)
CO2 SERPL-SCNC: 26 MMOL/L (ref 22–29)
CO2 SERPL-SCNC: 26 MMOL/L (ref 22–29)
COHGB MFR BLD: 0.7 % (ref 0–5)
COLOR UR: YELLOW
CREAT SERPL-MCNC: 1.01 MG/DL (ref 0.76–1.27)
CREAT SERPL-MCNC: 1.11 MG/DL (ref 0.76–1.27)
CREAT SERPL-MCNC: 1.47 MG/DL (ref 0.76–1.27)
D DIMER PPP FEU-MCNC: 0.68 MCGFEU/ML (ref 0–0.5)
D-LACTATE SERPL-SCNC: 0.9 MMOL/L (ref 0.5–2)
D-LACTATE SERPL-SCNC: 1.7 MMOL/L (ref 0.5–2)
DEPRECATED RDW RBC AUTO: 42 FL (ref 37–54)
DEPRECATED RDW RBC AUTO: 42.1 FL (ref 37–54)
DEPRECATED RDW RBC AUTO: 42.9 FL (ref 37–54)
EGFRCR SERPLBLD CKD-EPI 2021: 47.6 ML/MIN/1.73
EGFRCR SERPLBLD CKD-EPI 2021: 66.7 ML/MIN/1.73
EGFRCR SERPLBLD CKD-EPI 2021: 74.7 ML/MIN/1.73
EOSINOPHIL # BLD AUTO: 0.03 10*3/MM3 (ref 0–0.4)
EOSINOPHIL # BLD AUTO: 0.12 10*3/MM3 (ref 0–0.4)
EOSINOPHIL # BLD AUTO: 0.23 10*3/MM3 (ref 0–0.4)
EOSINOPHIL NFR BLD AUTO: 0.2 % (ref 0.3–6.2)
EOSINOPHIL NFR BLD AUTO: 1 % (ref 0.3–6.2)
EOSINOPHIL NFR BLD AUTO: 1.1 % (ref 0.3–6.2)
ERYTHROCYTE [DISTWIDTH] IN BLOOD BY AUTOMATED COUNT: 13.2 % (ref 12.3–15.4)
ERYTHROCYTE [DISTWIDTH] IN BLOOD BY AUTOMATED COUNT: 13.3 % (ref 12.3–15.4)
ERYTHROCYTE [DISTWIDTH] IN BLOOD BY AUTOMATED COUNT: 13.4 % (ref 12.3–15.4)
FIBRINOGEN PPP-MCNC: 230 MG/DL (ref 240–460)
GLOBULIN UR ELPH-MCNC: 1.5 GM/DL
GLOBULIN UR ELPH-MCNC: 1.8 GM/DL
GLOBULIN UR ELPH-MCNC: 2.2 GM/DL
GLUCOSE SERPL-MCNC: 102 MG/DL (ref 65–99)
GLUCOSE SERPL-MCNC: 162 MG/DL (ref 65–99)
GLUCOSE SERPL-MCNC: 168 MG/DL (ref 65–99)
GLUCOSE UR STRIP-MCNC: NEGATIVE MG/DL
HCO3 BLDA-SCNC: 22.1 MMOL/L (ref 20–26)
HCO3 BLDA-SCNC: 23.9 MMOL/L (ref 20–26)
HCT VFR BLD AUTO: 22.1 % (ref 37.5–51)
HCT VFR BLD AUTO: 23.9 % (ref 37.5–51)
HCT VFR BLD AUTO: 24.1 % (ref 37.5–51)
HCT VFR BLD AUTO: 28.3 % (ref 37.5–51)
HCT VFR BLD CALC: 18.8 % (ref 38–51)
HEMOCCULT STL QL: POSITIVE
HGB BLD-MCNC: 7.7 G/DL (ref 13–17.7)
HGB BLD-MCNC: 7.9 G/DL (ref 13–17.7)
HGB BLD-MCNC: 8.2 G/DL (ref 13–17.7)
HGB BLD-MCNC: 9.5 G/DL (ref 13–17.7)
HGB BLDA-MCNC: 6.1 G/DL (ref 14–18)
HGB UR QL STRIP.AUTO: NEGATIVE
IMM GRANULOCYTES # BLD AUTO: 0.12 10*3/MM3 (ref 0–0.05)
IMM GRANULOCYTES # BLD AUTO: 0.18 10*3/MM3 (ref 0–0.05)
IMM GRANULOCYTES # BLD AUTO: 0.25 10*3/MM3 (ref 0–0.05)
IMM GRANULOCYTES NFR BLD AUTO: 1 % (ref 0–0.5)
IMM GRANULOCYTES NFR BLD AUTO: 1.1 % (ref 0–0.5)
IMM GRANULOCYTES NFR BLD AUTO: 1.2 % (ref 0–0.5)
INHALED O2 CONCENTRATION: 100 %
INHALED O2 CONCENTRATION: 100 %
INR PPP: 1.39 (ref 0.91–1.09)
INR PPP: 1.53 (ref 0.91–1.09)
KETONES UR QL STRIP: NEGATIVE
LEUKOCYTE ESTERASE UR QL STRIP.AUTO: NEGATIVE
LYMPHOCYTES # BLD AUTO: 0.96 10*3/MM3 (ref 0.7–3.1)
LYMPHOCYTES # BLD AUTO: 2.11 10*3/MM3 (ref 0.7–3.1)
LYMPHOCYTES # BLD AUTO: 2.66 10*3/MM3 (ref 0.7–3.1)
LYMPHOCYTES NFR BLD AUTO: 13 % (ref 19.6–45.3)
LYMPHOCYTES NFR BLD AUTO: 17 % (ref 19.6–45.3)
LYMPHOCYTES NFR BLD AUTO: 5.8 % (ref 19.6–45.3)
Lab: ABNORMAL
MAGNESIUM SERPL-MCNC: 1.7 MG/DL (ref 1.6–2.4)
MCH RBC QN AUTO: 29.7 PG (ref 26.6–33)
MCH RBC QN AUTO: 29.8 PG (ref 26.6–33)
MCH RBC QN AUTO: 30.7 PG (ref 26.6–33)
MCHC RBC AUTO-ENTMCNC: 33.6 G/DL (ref 31.5–35.7)
MCHC RBC AUTO-ENTMCNC: 34.3 G/DL (ref 31.5–35.7)
MCHC RBC AUTO-ENTMCNC: 34.8 G/DL (ref 31.5–35.7)
MCV RBC AUTO: 86.9 FL (ref 79–97)
MCV RBC AUTO: 88 FL (ref 79–97)
MCV RBC AUTO: 88.4 FL (ref 79–97)
METHGB BLD QL: 1.1 % (ref 0–3)
MODALITY: ABNORMAL
MODALITY: ABNORMAL
MONOCYTES # BLD AUTO: 0.49 10*3/MM3 (ref 0.1–0.9)
MONOCYTES # BLD AUTO: 0.74 10*3/MM3 (ref 0.1–0.9)
MONOCYTES # BLD AUTO: 1.06 10*3/MM3 (ref 0.1–0.9)
MONOCYTES NFR BLD AUTO: 3 % (ref 5–12)
MONOCYTES NFR BLD AUTO: 5.2 % (ref 5–12)
MONOCYTES NFR BLD AUTO: 6 % (ref 5–12)
NEUTROPHILS NFR BLD AUTO: 14.73 10*3/MM3 (ref 1.7–7)
NEUTROPHILS NFR BLD AUTO: 16.23 10*3/MM3 (ref 1.7–7)
NEUTROPHILS NFR BLD AUTO: 74.8 % (ref 42.7–76)
NEUTROPHILS NFR BLD AUTO: 79.2 % (ref 42.7–76)
NEUTROPHILS NFR BLD AUTO: 89.7 % (ref 42.7–76)
NEUTROPHILS NFR BLD AUTO: 9.3 10*3/MM3 (ref 1.7–7)
NITRITE UR QL STRIP: NEGATIVE
NOTE: ABNORMAL
NOTIFIED BY: ABNORMAL
NOTIFIED WHO: ABNORMAL
NRBC BLD AUTO-RTO: 0 /100 WBC (ref 0–0.2)
NT-PROBNP SERPL-MCNC: 128.7 PG/ML (ref 0–1800)
OXYHGB MFR BLDV: 98.2 % (ref 94–99)
PCO2 BLDA: 41.7 MM HG (ref 35–45)
PCO2 BLDA: 43 MM HG (ref 35–45)
PCO2 TEMP ADJ BLD: 41.1 MM HG (ref 35–45)
PCO2 TEMP ADJ BLD: 41.7 MM HG (ref 35–45)
PEEP RESPIRATORY: 5 CM[H2O]
PH BLDA: 7.32 PH UNITS (ref 7.35–7.45)
PH BLDA: 7.37 PH UNITS (ref 7.35–7.45)
PH UR STRIP.AUTO: 6 [PH] (ref 5–8)
PH, TEMP CORRECTED: 7.33 PH UNITS (ref 7.35–7.45)
PH, TEMP CORRECTED: 7.37 PH UNITS (ref 7.35–7.45)
PLATELET # BLD AUTO: 172 10*3/MM3 (ref 140–450)
PLATELET # BLD AUTO: 213 10*3/MM3 (ref 140–450)
PLATELET # BLD AUTO: 308 10*3/MM3 (ref 140–450)
PMV BLD AUTO: 10.1 FL (ref 6–12)
PMV BLD AUTO: 10.5 FL (ref 6–12)
PMV BLD AUTO: 10.6 FL (ref 6–12)
PO2 BLDA: 324 MM HG (ref 83–108)
PO2 BLDA: 374 MM HG (ref 83–108)
PO2 TEMP ADJ BLD: 324 MM HG (ref 83–108)
PO2 TEMP ADJ BLD: 369 MM HG (ref 83–108)
POTASSIUM BLDA-SCNC: 3.5 MMOL/L (ref 3.5–5.2)
POTASSIUM SERPL-SCNC: 3.9 MMOL/L (ref 3.5–5.2)
POTASSIUM SERPL-SCNC: 3.9 MMOL/L (ref 3.5–5.2)
POTASSIUM SERPL-SCNC: 4 MMOL/L (ref 3.5–5.2)
PROT SERPL-MCNC: 4.4 G/DL (ref 6–8.5)
PROT SERPL-MCNC: 4.8 G/DL (ref 6–8.5)
PROT SERPL-MCNC: 5.4 G/DL (ref 6–8.5)
PROT UR QL STRIP: ABNORMAL
PROTHROMBIN TIME: 16.5 SECONDS (ref 11.9–14.6)
PROTHROMBIN TIME: 17.8 SECONDS (ref 11.9–14.6)
QT INTERVAL: 386 MS
QTC INTERVAL: 413 MS
RBC # BLD AUTO: 2.51 10*6/MM3 (ref 4.14–5.8)
RBC # BLD AUTO: 2.75 10*6/MM3 (ref 4.14–5.8)
RBC # BLD AUTO: 3.2 10*6/MM3 (ref 4.14–5.8)
RH BLD: POSITIVE
RH BLD: POSITIVE
SAO2 % BLDCOA: 100 % (ref 94–99)
SAO2 % BLDCOA: >100.1 % (ref 94–99)
SARS-COV-2 RNA PNL SPEC NAA+PROBE: NOT DETECTED
SET MECH RESP RATE: 20
SODIUM BLDA-SCNC: 137 MMOL/L (ref 136–145)
SODIUM SERPL-SCNC: 134 MMOL/L (ref 136–145)
SODIUM SERPL-SCNC: 136 MMOL/L (ref 136–145)
SODIUM SERPL-SCNC: 139 MMOL/L (ref 136–145)
SP GR UR STRIP: >=1.03 (ref 1–1.03)
T&S EXPIRATION DATE: NORMAL
TROPONIN T SERPL-MCNC: 0.01 NG/ML (ref 0–0.03)
TROPONIN T SERPL-MCNC: <0.01 NG/ML (ref 0–0.03)
UROBILINOGEN UR QL STRIP: ABNORMAL
VENTILATOR MODE: ABNORMAL
VENTILATOR MODE: AC
VT ON VENT VENT: 550 ML
WBC NRBC COR # BLD: 12.42 10*3/MM3 (ref 3.4–10.8)
WBC NRBC COR # BLD: 16.42 10*3/MM3 (ref 3.4–10.8)
WBC NRBC COR # BLD: 20.49 10*3/MM3 (ref 3.4–10.8)

## 2022-06-20 PROCEDURE — 25010000002 EPINEPHRINE PER 0.1 MG: Performed by: NURSE ANESTHETIST, CERTIFIED REGISTERED

## 2022-06-20 PROCEDURE — 25010000002 ALBUMIN HUMAN 5% PER 50 ML: Performed by: NURSE ANESTHETIST, CERTIFIED REGISTERED

## 2022-06-20 PROCEDURE — 74174 CTA ABD&PLVS W/CONTRAST: CPT

## 2022-06-20 PROCEDURE — 84484 ASSAY OF TROPONIN QUANT: CPT | Performed by: EMERGENCY MEDICINE

## 2022-06-20 PROCEDURE — 86927 PLASMA FRESH FROZEN: CPT

## 2022-06-20 PROCEDURE — P9016 RBC LEUKOCYTES REDUCED: HCPCS

## 2022-06-20 PROCEDURE — 83605 ASSAY OF LACTIC ACID: CPT | Performed by: EMERGENCY MEDICINE

## 2022-06-20 PROCEDURE — 82272 OCCULT BLD FECES 1-3 TESTS: CPT | Performed by: INTERNAL MEDICINE

## 2022-06-20 PROCEDURE — 25010000002 FLUCONAZOLE PER 200 MG: Performed by: STUDENT IN AN ORGANIZED HEALTH CARE EDUCATION/TRAINING PROGRAM

## 2022-06-20 PROCEDURE — 85025 COMPLETE CBC W/AUTO DIFF WBC: CPT | Performed by: NURSE ANESTHETIST, CERTIFIED REGISTERED

## 2022-06-20 PROCEDURE — 94761 N-INVAS EAR/PLS OXIMETRY MLT: CPT

## 2022-06-20 PROCEDURE — C1751 CATH, INF, PER/CENT/MIDLINE: HCPCS | Performed by: NURSE ANESTHETIST, CERTIFIED REGISTERED

## 2022-06-20 PROCEDURE — 80053 COMPREHEN METABOLIC PANEL: CPT | Performed by: STUDENT IN AN ORGANIZED HEALTH CARE EDUCATION/TRAINING PROGRAM

## 2022-06-20 PROCEDURE — 86920 COMPATIBILITY TEST SPIN: CPT

## 2022-06-20 PROCEDURE — 0 IOPAMIDOL PER 1 ML: Performed by: EMERGENCY MEDICINE

## 2022-06-20 PROCEDURE — P9041 ALBUMIN (HUMAN),5%, 50ML: HCPCS | Performed by: NURSE ANESTHETIST, CERTIFIED REGISTERED

## 2022-06-20 PROCEDURE — 81003 URINALYSIS AUTO W/O SCOPE: CPT | Performed by: EMERGENCY MEDICINE

## 2022-06-20 PROCEDURE — 94799 UNLISTED PULMONARY SVC/PX: CPT

## 2022-06-20 PROCEDURE — P9035 PLATELET PHERES LEUKOREDUCED: HCPCS

## 2022-06-20 PROCEDURE — 25010000002 MIDAZOLAM PER 1 MG: Performed by: NURSE ANESTHETIST, CERTIFIED REGISTERED

## 2022-06-20 PROCEDURE — 0DQ90ZZ REPAIR DUODENUM, OPEN APPROACH: ICD-10-PCS | Performed by: STUDENT IN AN ORGANIZED HEALTH CARE EDUCATION/TRAINING PROGRAM

## 2022-06-20 PROCEDURE — 0W3P8ZZ CONTROL BLEEDING IN GASTROINTESTINAL TRACT, VIA NATURAL OR ARTIFICIAL OPENING ENDOSCOPIC: ICD-10-PCS | Performed by: INTERNAL MEDICINE

## 2022-06-20 PROCEDURE — 85384 FIBRINOGEN ACTIVITY: CPT | Performed by: NURSE ANESTHETIST, CERTIFIED REGISTERED

## 2022-06-20 PROCEDURE — 85610 PROTHROMBIN TIME: CPT | Performed by: NURSE ANESTHETIST, CERTIFIED REGISTERED

## 2022-06-20 PROCEDURE — 83735 ASSAY OF MAGNESIUM: CPT | Performed by: EMERGENCY MEDICINE

## 2022-06-20 PROCEDURE — 25010000002 HYDROMORPHONE PER 4 MG: Performed by: NURSE ANESTHETIST, CERTIFIED REGISTERED

## 2022-06-20 PROCEDURE — 25010000002 EPINEPHRINE 1 MG/10ML SOLUTION PREFILLED SYRINGE: Performed by: INTERNAL MEDICINE

## 2022-06-20 PROCEDURE — 86850 RBC ANTIBODY SCREEN: CPT | Performed by: INTERNAL MEDICINE

## 2022-06-20 PROCEDURE — 85025 COMPLETE CBC W/AUTO DIFF WBC: CPT | Performed by: EMERGENCY MEDICINE

## 2022-06-20 PROCEDURE — P9059 PLASMA, FRZ BETWEEN 8-24HOUR: HCPCS

## 2022-06-20 PROCEDURE — 86900 BLOOD TYPING SEROLOGIC ABO: CPT | Performed by: INTERNAL MEDICINE

## 2022-06-20 PROCEDURE — 86900 BLOOD TYPING SEROLOGIC ABO: CPT

## 2022-06-20 PROCEDURE — 82805 BLOOD GASES W/O2 SATURATION: CPT

## 2022-06-20 PROCEDURE — 36430 TRANSFUSION BLD/BLD COMPNT: CPT

## 2022-06-20 PROCEDURE — 25010000002 DEXAMETHASONE PER 1 MG: Performed by: NURSE ANESTHETIST, CERTIFIED REGISTERED

## 2022-06-20 PROCEDURE — 84484 ASSAY OF TROPONIN QUANT: CPT | Performed by: INTERNAL MEDICINE

## 2022-06-20 PROCEDURE — 99223 1ST HOSP IP/OBS HIGH 75: CPT | Performed by: STUDENT IN AN ORGANIZED HEALTH CARE EDUCATION/TRAINING PROGRAM

## 2022-06-20 PROCEDURE — 83880 ASSAY OF NATRIURETIC PEPTIDE: CPT | Performed by: EMERGENCY MEDICINE

## 2022-06-20 PROCEDURE — 85018 HEMOGLOBIN: CPT | Performed by: INTERNAL MEDICINE

## 2022-06-20 PROCEDURE — 25010000002 FENTANYL CITRATE (PF) 250 MCG/5ML SOLUTION: Performed by: NURSE ANESTHETIST, CERTIFIED REGISTERED

## 2022-06-20 PROCEDURE — 85610 PROTHROMBIN TIME: CPT | Performed by: STUDENT IN AN ORGANIZED HEALTH CARE EDUCATION/TRAINING PROGRAM

## 2022-06-20 PROCEDURE — 93005 ELECTROCARDIOGRAM TRACING: CPT | Performed by: EMERGENCY MEDICINE

## 2022-06-20 PROCEDURE — 25010000002 EPINEPHRINE 1 MG/10ML SOLUTION PREFILLED SYRINGE: Performed by: NURSE ANESTHETIST, CERTIFIED REGISTERED

## 2022-06-20 PROCEDURE — 25010000002 PROPOFOL 10 MG/ML EMULSION: Performed by: STUDENT IN AN ORGANIZED HEALTH CARE EDUCATION/TRAINING PROGRAM

## 2022-06-20 PROCEDURE — 94002 VENT MGMT INPAT INIT DAY: CPT

## 2022-06-20 PROCEDURE — 82375 ASSAY CARBOXYHB QUANT: CPT

## 2022-06-20 PROCEDURE — 83605 ASSAY OF LACTIC ACID: CPT | Performed by: NURSE ANESTHETIST, CERTIFIED REGISTERED

## 2022-06-20 PROCEDURE — 85025 COMPLETE CBC W/AUTO DIFF WBC: CPT | Performed by: INTERNAL MEDICINE

## 2022-06-20 PROCEDURE — 25010000002 CEFTRIAXONE PER 250 MG: Performed by: EMERGENCY MEDICINE

## 2022-06-20 PROCEDURE — 25010000002 PROPOFOL 10 MG/ML EMULSION: Performed by: NURSE ANESTHETIST, CERTIFIED REGISTERED

## 2022-06-20 PROCEDURE — 86901 BLOOD TYPING SEROLOGIC RH(D): CPT | Performed by: INTERNAL MEDICINE

## 2022-06-20 PROCEDURE — 71045 X-RAY EXAM CHEST 1 VIEW: CPT

## 2022-06-20 PROCEDURE — 83050 HGB METHEMOGLOBIN QUAN: CPT

## 2022-06-20 PROCEDURE — P9037 PLATE PHERES LEUKOREDU IRRAD: HCPCS

## 2022-06-20 PROCEDURE — 99222 1ST HOSP IP/OBS MODERATE 55: CPT | Performed by: INTERNAL MEDICINE

## 2022-06-20 PROCEDURE — 87040 BLOOD CULTURE FOR BACTERIA: CPT | Performed by: EMERGENCY MEDICINE

## 2022-06-20 PROCEDURE — 43255 EGD CONTROL BLEEDING ANY: CPT | Performed by: INTERNAL MEDICINE

## 2022-06-20 PROCEDURE — 80053 COMPREHEN METABOLIC PANEL: CPT | Performed by: INTERNAL MEDICINE

## 2022-06-20 PROCEDURE — 85379 FIBRIN DEGRADATION QUANT: CPT | Performed by: EMERGENCY MEDICINE

## 2022-06-20 PROCEDURE — 43840 GSTRRPHY SUTR DUOL/GSTR ULCR: CPT | Performed by: STUDENT IN AN ORGANIZED HEALTH CARE EDUCATION/TRAINING PROGRAM

## 2022-06-20 PROCEDURE — 82803 BLOOD GASES ANY COMBINATION: CPT

## 2022-06-20 PROCEDURE — 80053 COMPREHEN METABOLIC PANEL: CPT | Performed by: EMERGENCY MEDICINE

## 2022-06-20 PROCEDURE — 93010 ELECTROCARDIOGRAM REPORT: CPT | Performed by: INTERNAL MEDICINE

## 2022-06-20 PROCEDURE — 87635 SARS-COV-2 COVID-19 AMP PRB: CPT | Performed by: EMERGENCY MEDICINE

## 2022-06-20 PROCEDURE — 25010000002 MIDAZOLAM HCL (PF) 5 MG/5ML SOLUTION: Performed by: NURSE ANESTHETIST, CERTIFIED REGISTERED

## 2022-06-20 PROCEDURE — 85014 HEMATOCRIT: CPT | Performed by: INTERNAL MEDICINE

## 2022-06-20 PROCEDURE — 3E0G8GC INTRODUCTION OF OTHER THERAPEUTIC SUBSTANCE INTO UPPER GI, VIA NATURAL OR ARTIFICIAL OPENING ENDOSCOPIC: ICD-10-PCS | Performed by: INTERNAL MEDICINE

## 2022-06-20 PROCEDURE — P9100 PATHOGEN TEST FOR PLATELETS: HCPCS

## 2022-06-20 PROCEDURE — 25010000002 CEFAZOLIN PER 500 MG: Performed by: NURSE ANESTHETIST, CERTIFIED REGISTERED

## 2022-06-20 PROCEDURE — 71275 CT ANGIOGRAPHY CHEST: CPT

## 2022-06-20 DEVICE — DEV CLIP HEMO RESOLUTION360/ULTR 235CM 17MM STRL: Type: IMPLANTABLE DEVICE | Site: DUODENUM | Status: FUNCTIONAL

## 2022-06-20 RX ORDER — LABETALOL HYDROCHLORIDE 5 MG/ML
10 INJECTION, SOLUTION INTRAVENOUS EVERY 4 HOURS PRN
Status: DISCONTINUED | OUTPATIENT
Start: 2022-06-20 | End: 2022-06-23 | Stop reason: SDUPTHER

## 2022-06-20 RX ORDER — BUPIVACAINE HCL/0.9 % NACL/PF 0.1 %
2 PLASTIC BAG, INJECTION (ML) EPIDURAL EVERY 8 HOURS
Status: COMPLETED | OUTPATIENT
Start: 2022-06-21 | End: 2022-06-24

## 2022-06-20 RX ORDER — PROPOFOL 10 MG/ML
VIAL (ML) INTRAVENOUS AS NEEDED
Status: DISCONTINUED | OUTPATIENT
Start: 2022-06-20 | End: 2022-06-20 | Stop reason: SURG

## 2022-06-20 RX ORDER — METRONIDAZOLE 500 MG/100ML
500 INJECTION, SOLUTION INTRAVENOUS EVERY 8 HOURS
Status: DISPENSED | OUTPATIENT
Start: 2022-06-20 | End: 2022-06-24

## 2022-06-20 RX ORDER — SODIUM CHLORIDE, SODIUM LACTATE, POTASSIUM CHLORIDE, CALCIUM CHLORIDE 600; 310; 30; 20 MG/100ML; MG/100ML; MG/100ML; MG/100ML
75 INJECTION, SOLUTION INTRAVENOUS CONTINUOUS
Status: DISCONTINUED | OUTPATIENT
Start: 2022-06-20 | End: 2022-06-22

## 2022-06-20 RX ORDER — EPINEPHRINE 0.1 MG/ML
SYRINGE (ML) INJECTION AS NEEDED
Status: DISCONTINUED | OUTPATIENT
Start: 2022-06-20 | End: 2022-06-20 | Stop reason: HOSPADM

## 2022-06-20 RX ORDER — ONDANSETRON 2 MG/ML
4 INJECTION INTRAMUSCULAR; INTRAVENOUS EVERY 6 HOURS PRN
Status: DISCONTINUED | OUTPATIENT
Start: 2022-06-20 | End: 2022-07-06 | Stop reason: HOSPADM

## 2022-06-20 RX ORDER — SODIUM CHLORIDE, SODIUM LACTATE, POTASSIUM CHLORIDE, CALCIUM CHLORIDE 600; 310; 30; 20 MG/100ML; MG/100ML; MG/100ML; MG/100ML
INJECTION, SOLUTION INTRAVENOUS CONTINUOUS PRN
Status: DISCONTINUED | OUTPATIENT
Start: 2022-06-20 | End: 2022-06-20 | Stop reason: SURG

## 2022-06-20 RX ORDER — ALBUMIN, HUMAN INJ 5% 5 %
SOLUTION INTRAVENOUS CONTINUOUS PRN
Status: DISCONTINUED | OUTPATIENT
Start: 2022-06-20 | End: 2022-06-20 | Stop reason: SURG

## 2022-06-20 RX ORDER — LIDOCAINE HYDROCHLORIDE 20 MG/ML
INJECTION, SOLUTION EPIDURAL; INFILTRATION; INTRACAUDAL; PERINEURAL AS NEEDED
Status: DISCONTINUED | OUTPATIENT
Start: 2022-06-20 | End: 2022-06-20 | Stop reason: SURG

## 2022-06-20 RX ORDER — SODIUM CHLORIDE 9 MG/ML
INJECTION, SOLUTION INTRAVENOUS CONTINUOUS PRN
Status: DISCONTINUED | OUTPATIENT
Start: 2022-06-20 | End: 2022-06-20 | Stop reason: SURG

## 2022-06-20 RX ORDER — FENTANYL CITRATE 50 UG/ML
25 INJECTION, SOLUTION INTRAMUSCULAR; INTRAVENOUS
Status: DISCONTINUED | OUTPATIENT
Start: 2022-06-20 | End: 2022-06-23

## 2022-06-20 RX ORDER — SODIUM CHLORIDE 0.9 % (FLUSH) 0.9 %
10 SYRINGE (ML) INJECTION AS NEEDED
Status: DISCONTINUED | OUTPATIENT
Start: 2022-06-20 | End: 2022-07-06 | Stop reason: HOSPADM

## 2022-06-20 RX ORDER — FENTANYL CITRATE 50 UG/ML
INJECTION, SOLUTION INTRAMUSCULAR; INTRAVENOUS AS NEEDED
Status: DISCONTINUED | OUTPATIENT
Start: 2022-06-20 | End: 2022-06-20

## 2022-06-20 RX ORDER — PANTOPRAZOLE SODIUM 40 MG/10ML
80 INJECTION, POWDER, LYOPHILIZED, FOR SOLUTION INTRAVENOUS ONCE
Status: COMPLETED | OUTPATIENT
Start: 2022-06-20 | End: 2022-06-20

## 2022-06-20 RX ORDER — EPINEPHRINE 1 MG/ML
INJECTION, SOLUTION, CONCENTRATE INTRAVENOUS AS NEEDED
Status: DISCONTINUED | OUTPATIENT
Start: 2022-06-20 | End: 2022-06-20 | Stop reason: SURG

## 2022-06-20 RX ORDER — MAGNESIUM HYDROXIDE 1200 MG/15ML
LIQUID ORAL AS NEEDED
Status: DISCONTINUED | OUTPATIENT
Start: 2022-06-20 | End: 2022-06-20 | Stop reason: HOSPADM

## 2022-06-20 RX ORDER — SUCCINYLCHOLINE/SOD CL,ISO/PF 200MG/10ML
SYRINGE (ML) INTRAVENOUS AS NEEDED
Status: DISCONTINUED | OUTPATIENT
Start: 2022-06-20 | End: 2022-06-20 | Stop reason: SURG

## 2022-06-20 RX ORDER — BUPIVACAINE HCL/0.9 % NACL/PF 0.125 %
PLASTIC BAG, INJECTION (ML) EPIDURAL AS NEEDED
Status: DISCONTINUED | OUTPATIENT
Start: 2022-06-20 | End: 2022-06-20 | Stop reason: SURG

## 2022-06-20 RX ORDER — ACETAMINOPHEN 325 MG/1
650 TABLET ORAL EVERY 4 HOURS PRN
Status: DISCONTINUED | OUTPATIENT
Start: 2022-06-20 | End: 2022-06-20

## 2022-06-20 RX ORDER — MELOXICAM 7.5 MG/1
7.5 TABLET ORAL DAILY
Status: ON HOLD | COMMUNITY
End: 2022-06-20 | Stop reason: ALTCHOICE

## 2022-06-20 RX ORDER — MIDAZOLAM HYDROCHLORIDE 1 MG/ML
INJECTION, SOLUTION INTRAMUSCULAR; INTRAVENOUS AS NEEDED
Status: DISCONTINUED | OUTPATIENT
Start: 2022-06-20 | End: 2022-06-20 | Stop reason: SURG

## 2022-06-20 RX ORDER — DEXAMETHASONE SODIUM PHOSPHATE 4 MG/ML
INJECTION, SOLUTION INTRA-ARTICULAR; INTRALESIONAL; INTRAMUSCULAR; INTRAVENOUS; SOFT TISSUE AS NEEDED
Status: DISCONTINUED | OUTPATIENT
Start: 2022-06-20 | End: 2022-06-20 | Stop reason: SURG

## 2022-06-20 RX ORDER — FENTANYL CITRATE 50 UG/ML
INJECTION, SOLUTION INTRAMUSCULAR; INTRAVENOUS AS NEEDED
Status: DISCONTINUED | OUTPATIENT
Start: 2022-06-20 | End: 2022-06-20 | Stop reason: SURG

## 2022-06-20 RX ORDER — MIDAZOLAM HYDROCHLORIDE 1 MG/ML
INJECTION INTRAMUSCULAR; INTRAVENOUS AS NEEDED
Status: DISCONTINUED | OUTPATIENT
Start: 2022-06-20 | End: 2022-06-20 | Stop reason: SURG

## 2022-06-20 RX ORDER — EPINEPHRINE 0.1 MG/ML
SYRINGE (ML) INJECTION AS NEEDED
Status: DISCONTINUED | OUTPATIENT
Start: 2022-06-20 | End: 2022-06-20 | Stop reason: SURG

## 2022-06-20 RX ORDER — CEFAZOLIN SODIUM 1 G/3ML
INJECTION, POWDER, FOR SOLUTION INTRAMUSCULAR; INTRAVENOUS AS NEEDED
Status: DISCONTINUED | OUTPATIENT
Start: 2022-06-20 | End: 2022-06-20 | Stop reason: SURG

## 2022-06-20 RX ORDER — SODIUM CHLORIDE 0.9 % (FLUSH) 0.9 %
10 SYRINGE (ML) INJECTION EVERY 12 HOURS SCHEDULED
Status: DISCONTINUED | OUTPATIENT
Start: 2022-06-20 | End: 2022-06-20

## 2022-06-20 RX ORDER — VECURONIUM BROMIDE 1 MG/ML
INJECTION, POWDER, LYOPHILIZED, FOR SOLUTION INTRAVENOUS AS NEEDED
Status: DISCONTINUED | OUTPATIENT
Start: 2022-06-20 | End: 2022-06-20 | Stop reason: SURG

## 2022-06-20 RX ORDER — HYDROMORPHONE HCL 110MG/55ML
PATIENT CONTROLLED ANALGESIA SYRINGE INTRAVENOUS AS NEEDED
Status: DISCONTINUED | OUTPATIENT
Start: 2022-06-20 | End: 2022-06-20 | Stop reason: SURG

## 2022-06-20 RX ADMIN — PROPOFOL 45 MCG/KG/MIN: 10 INJECTION, EMULSION INTRAVENOUS at 22:21

## 2022-06-20 RX ADMIN — IOPAMIDOL 100 ML: 755 INJECTION, SOLUTION INTRAVENOUS at 01:40

## 2022-06-20 RX ADMIN — Medication 10 ML: at 08:23

## 2022-06-20 RX ADMIN — ALBUMIN HUMAN: 0.05 INJECTION, SOLUTION INTRAVENOUS at 14:55

## 2022-06-20 RX ADMIN — HYDROMORPHONE HYDROCHLORIDE 2 MG: 2 INJECTION, SOLUTION INTRAMUSCULAR; INTRAVENOUS; SUBCUTANEOUS at 17:10

## 2022-06-20 RX ADMIN — EPINEPHRINE 100 MCG: 1 INJECTION, SOLUTION, CONCENTRATE INTRAVENOUS at 15:01

## 2022-06-20 RX ADMIN — ALBUMIN HUMAN: 0.05 INJECTION, SOLUTION INTRAVENOUS at 14:42

## 2022-06-20 RX ADMIN — PROPOFOL 600 MG: 10 INJECTION, EMULSION INTRAVENOUS at 14:16

## 2022-06-20 RX ADMIN — VASOPRESSIN 0.04 UNITS/MIN: 20 INJECTION INTRAVENOUS at 15:58

## 2022-06-20 RX ADMIN — SODIUM CHLORIDE, POTASSIUM CHLORIDE, SODIUM LACTATE AND CALCIUM CHLORIDE: 600; 310; 30; 20 INJECTION, SOLUTION INTRAVENOUS at 15:18

## 2022-06-20 RX ADMIN — Medication 100 MCG: at 14:40

## 2022-06-20 RX ADMIN — PANTOPRAZOLE SODIUM 8 MG/HR: 40 INJECTION, POWDER, FOR SOLUTION INTRAVENOUS at 10:06

## 2022-06-20 RX ADMIN — Medication 200 MCG: at 14:56

## 2022-06-20 RX ADMIN — MIDAZOLAM HYDROCHLORIDE 3 MG: 1 INJECTION, SOLUTION INTRAMUSCULAR; INTRAVENOUS at 14:56

## 2022-06-20 RX ADMIN — SODIUM CHLORIDE 1000 ML: 9 INJECTION, SOLUTION INTRAVENOUS at 00:15

## 2022-06-20 RX ADMIN — SODIUM CHLORIDE 1000 ML: 9 INJECTION, SOLUTION INTRAVENOUS at 00:42

## 2022-06-20 RX ADMIN — Medication 200 MG: at 15:08

## 2022-06-20 RX ADMIN — FENTANYL CITRATE 150 MCG: 50 INJECTION, SOLUTION INTRAMUSCULAR; INTRAVENOUS at 16:05

## 2022-06-20 RX ADMIN — CEFAZOLIN 2 G: 330 INJECTION, POWDER, FOR SOLUTION INTRAMUSCULAR; INTRAVENOUS at 15:25

## 2022-06-20 RX ADMIN — MIDAZOLAM HYDROCHLORIDE 2 MG: 1 INJECTION, SOLUTION INTRAMUSCULAR; INTRAVENOUS at 15:11

## 2022-06-20 RX ADMIN — METRONIDAZOLE 500 MG: 500 INJECTION, SOLUTION INTRAVENOUS at 18:02

## 2022-06-20 RX ADMIN — EPINEPHRINE 0.1 MG: 0.1 INJECTION INTRACARDIAC; INTRAVENOUS at 15:20

## 2022-06-20 RX ADMIN — PANTOPRAZOLE SODIUM 8 MG/HR: 40 INJECTION, POWDER, FOR SOLUTION INTRAVENOUS at 13:20

## 2022-06-20 RX ADMIN — VECURONIUM BROMIDE 5 MG: 1 INJECTION, POWDER, LYOPHILIZED, FOR SOLUTION INTRAVENOUS at 17:28

## 2022-06-20 RX ADMIN — SODIUM CHLORIDE 1 G: 9 INJECTION, SOLUTION INTRAVENOUS at 04:42

## 2022-06-20 RX ADMIN — PROPOFOL 5 MCG/KG/MIN: 10 INJECTION, EMULSION INTRAVENOUS at 18:03

## 2022-06-20 RX ADMIN — PANTOPRAZOLE SODIUM 8 MG/HR: 40 INJECTION, POWDER, FOR SOLUTION INTRAVENOUS at 22:21

## 2022-06-20 RX ADMIN — Medication 200 MCG: at 14:43

## 2022-06-20 RX ADMIN — PROPOFOL 60 MG: 10 INJECTION, EMULSION INTRAVENOUS at 15:30

## 2022-06-20 RX ADMIN — SODIUM CHLORIDE 2655 ML: 9 INJECTION, SOLUTION INTRAVENOUS at 04:41

## 2022-06-20 RX ADMIN — VECURONIUM BROMIDE 5 MG: 1 INJECTION, POWDER, LYOPHILIZED, FOR SOLUTION INTRAVENOUS at 17:05

## 2022-06-20 RX ADMIN — FLUCONAZOLE 800 MG: 2 INJECTION INTRAVENOUS at 17:12

## 2022-06-20 RX ADMIN — DEXAMETHASONE SODIUM PHOSPHATE 8 MG: 4 INJECTION, SOLUTION INTRA-ARTICULAR; INTRALESIONAL; INTRAMUSCULAR; INTRAVENOUS; SOFT TISSUE at 16:25

## 2022-06-20 RX ADMIN — EPINEPHRINE 100 MCG: 1 INJECTION, SOLUTION, CONCENTRATE INTRAVENOUS at 15:03

## 2022-06-20 RX ADMIN — FENTANYL CITRATE 100 MCG: 50 INJECTION, SOLUTION INTRAMUSCULAR; INTRAVENOUS at 15:30

## 2022-06-20 RX ADMIN — VECURONIUM BROMIDE 10 MG: 1 INJECTION, POWDER, LYOPHILIZED, FOR SOLUTION INTRAVENOUS at 15:26

## 2022-06-20 RX ADMIN — SODIUM CHLORIDE: 9 INJECTION, SOLUTION INTRAVENOUS at 15:20

## 2022-06-20 RX ADMIN — SODIUM CHLORIDE, POTASSIUM CHLORIDE, SODIUM LACTATE AND CALCIUM CHLORIDE 100 ML/HR: 600; 310; 30; 20 INJECTION, SOLUTION INTRAVENOUS at 07:33

## 2022-06-20 RX ADMIN — LIDOCAINE HYDROCHLORIDE 100 MG: 20 INJECTION, SOLUTION EPIDURAL; INFILTRATION; INTRACAUDAL; PERINEURAL at 14:16

## 2022-06-20 RX ADMIN — EPINEPHRINE 100 MCG: 1 INJECTION, SOLUTION, CONCENTRATE INTRAVENOUS at 14:59

## 2022-06-20 RX ADMIN — ALBUMIN HUMAN: 0.05 INJECTION, SOLUTION INTRAVENOUS at 15:30

## 2022-06-20 RX ADMIN — Medication 200 MCG: at 14:47

## 2022-06-20 RX ADMIN — PANTOPRAZOLE SODIUM 8 MG/HR: 40 INJECTION, POWDER, FOR SOLUTION INTRAVENOUS at 18:12

## 2022-06-20 RX ADMIN — PANTOPRAZOLE SODIUM 80 MG: 40 INJECTION, POWDER, FOR SOLUTION INTRAVENOUS at 10:06

## 2022-06-20 RX ADMIN — MIDAZOLAM 2 MG: 1 INJECTION INTRAMUSCULAR; INTRAVENOUS at 17:28

## 2022-06-20 NOTE — ANESTHESIA PROCEDURE NOTES
Airway  Urgency: elective    Airway not difficult    General Information and Staff    Patient location during procedure: OR  CRNA/CAA: Abeba Temple CRNA    Indications and Patient Condition  Indications for airway management: airway protection    Preoxygenated: yes  Mask difficulty assessment: 1 - vent by mask    Final Airway Details  Final airway type: endotracheal airway      Successful airway: ETT  Cuffed: yes   Successful intubation technique: direct laryngoscopy  Facilitating devices/methods: intubating stylet  Endotracheal tube insertion site: oral  Blade: Nj  Blade size: 2  ETT size (mm): 7.5  Cormack-Lehane Classification: grade IIa - partial view of glottis  Placement verified by: chest auscultation and capnometry   Cuff volume (mL): 8  Measured from: lips  ETT/EBT  to lips (cm): 23  Number of attempts at approach: 1  Assessment: lips, teeth, and gum same as pre-op and atraumatic intubation

## 2022-06-20 NOTE — ANESTHESIA PREPROCEDURE EVALUATION
Anesthesia Evaluation     Patient summary reviewed and Nursing notes reviewed   NPO Solid Status: > 8 hours  NPO Liquid Status: > 8 hours           Airway   Mallampati: I  TM distance: >3 FB  Neck ROM: full  No difficulty expected  Dental    (+) lower dentures and upper dentures    Pulmonary    (-) not a smoker  Cardiovascular   Exercise tolerance: poor (<4 METS)    ECG reviewed    (+) hypertension,   (-) CAD    ROS comment: Recently evaluated in er for chest pain, ekg and cardiac enzymes were wnl    Neuro/Psych    ROS Comment: Pt reports legs weakness and falls for the past few weeks  GI/Hepatic/Renal/Endo    (+)  GI bleeding ,     ROS Comment: Admitted with weakness, pain  Found to have anemia, egd to eval    Musculoskeletal     Abdominal    Substance History      OB/GYN          Other   blood dyscrasia anemia,                       Anesthesia Plan    ASA 4 - emergent     general     (Pt with EGD to eval gi bleed today, found large duodenal bleed, emergently intubated during procedure and rescuscitation started. He was transferred emergently to the operating room. )  intravenous induction     Anesthetic plan, risks, benefits, and alternatives have been provided, discussed and informed consent has been obtained with: patient.        CODE STATUS:

## 2022-06-20 NOTE — ANESTHESIA PROCEDURE NOTES
Arterial Line    Pre-sedation assessment completed: 6/20/2022 3:35 PM    Patient reassessed immediately prior to procedure    Patient location during procedure: pre-op  Start time: 6/20/2022 3:36 PM  Stop Time:6/20/2022 3:46 PM       Line placed for hemodynamic monitoring and ABGs/Labs/ISTAT.  Performed By   Anesthesiologist: Jennifer Lunsford MD  Preanesthetic Checklist  Completed: patient identified, IV checked, site marked, risks and benefits discussed, surgical consent, monitors and equipment checked, pre-op evaluation and timeout performed  Arterial Line Prep   Sterile Tech: cap, gloves and mask  Prep: ChloraPrep  Patient monitoring: continuous pulse oximetry, blood pressure monitoring and EKG  Arterial Line Procedure   Laterality:right  Location:  radial artery  Catheter size: 20 G   Guidance: ultrasound guided  PROCEDURE NOTE/ULTRASOUND INTERPRETATION.  Using ultrasound guidance the potential vascular sites for insertion of the catheter were visualized to determine the patency of the vessel to be used for vascular access.  After selecting the appropriate site for insertion, the needle was visualized under ultrasound being inserted into the radial artery, followed by ultrasound confirmation of wire and catheter placement. There were no abnormalities seen on ultrasound; an image was taken; and the patient tolerated the procedure with no complications.   Number of attempts: 1  Successful placement: yes  Post Assessment   Dressing Type: secured with tape and wrist guard applied.   Complications no  Circ/Move/Sens Assessment: normal and unchanged.   Patient Tolerance: patient tolerated the procedure well with no apparent complications

## 2022-06-20 NOTE — ANESTHESIA PREPROCEDURE EVALUATION
Anesthesia Evaluation     Patient summary reviewed and Nursing notes reviewed   NPO Solid Status: > 8 hours  NPO Liquid Status: > 8 hours           Airway   Mallampati: I  TM distance: >3 FB  Neck ROM: full  No difficulty expected  Dental    (+) lower dentures and upper dentures    Pulmonary    (-) not a smoker  Cardiovascular   Exercise tolerance: poor (<4 METS)    ECG reviewed    (+) hypertension,   (-) CAD    ROS comment: Recently evaluated in er for chest pain, ekg and cardiac enzymes were wnl    Neuro/Psych    ROS Comment: Pt reports legs weakness and falls for the past few weeks  GI/Hepatic/Renal/Endo    (+)  GI bleeding ,     ROS Comment: Admitted with weakness, pain  Found to have anemia, egd to eval    Musculoskeletal     Abdominal    Substance History      OB/GYN          Other   blood dyscrasia anemia,                     Anesthesia Plan    ASA 3 - emergent     MAC     intravenous induction     Anesthetic plan, risks, benefits, and alternatives have been provided, discussed and informed consent has been obtained with: patient.        CODE STATUS:    Level Of Support Discussed With: Patient  Code Status (Patient has no pulse and is not breathing): CPR (Attempt to Resuscitate)  Medical Interventions (Patient has pulse or is breathing): Full Support

## 2022-06-20 NOTE — ANESTHESIA POSTPROCEDURE EVALUATION
"Patient: Echo Temple    Procedure Summary     Date: 06/20/22 Room / Location:  [facility] OR  /  [facility] OR    Anesthesia Start: 1518 Anesthesia Stop: 1738    Procedure: LAPAROTOMY EXPLORATORY (N/A Abdomen) Diagnosis:     Surgeons: Nancy Park MD Provider: Abeba Temple CRNA    Anesthesia Type: general ASA Status: 4 - Emergent          Anesthesia Type: general    Vitals  No vitals data found for the desired time range.          Post Anesthesia Care and Evaluation    Patient location during evaluation: ICU  Patient participation: complete - patient cannot participate  Post-procedure mental status: sedated on vent.  Pain management: adequate    Airway patency: patent  Anesthetic complications: No anesthetic complications  PONV Status: NA  Cardiovascular status: acceptable and stable  Respiratory status: acceptable, ventilator and ETT  Hydration status: acceptable    Comments: Blood pressure 136/92, pulse 87, temperature 98 °F (36.7 °C), temperature source Oral, resp. rate 19, height 180.3 cm (71\"), weight 85.1 kg (187 lb 9.8 oz), SpO2 97 %.    Pt discharged from PACU based on kimberlyn score >8      "

## 2022-06-20 NOTE — ANESTHESIA PROCEDURE NOTES
Central Line    Pre-sedation assessment completed: 6/20/2022 3:46 PM    Patient reassessed immediately prior to procedure    Patient location during procedure: pre-op  Start time: 6/20/2022 3:47 PM  Stop Time:6/20/2022 3:57 PM  Indications: central pressure monitoring, vascular access and MD/Surgeon request  Staff  Anesthesiologist: Jennifer Lunsford MD  Preanesthetic Checklist  Completed: patient identified, IV checked, site marked, risks and benefits discussed, surgical consent, monitors and equipment checked, pre-op evaluation and timeout performed  Central Line Prep  Sterile Tech:cap, gloves, gown, mask and sterile barriers  Prep: chloraprep  no medical exclusion documented for following all elements of maximal sterile barrier technique  Patient monitoring: blood pressure monitoring, continuous pulse oximetry and EKG  Central Line Procedure  Laterality:right  Location:internal jugular  Catheter Type:triple lumen  Assessment  Post procedure:biopatch applied and line sutured  Assessement:chest x-ray ordered, blood return through all ports and free fluid flow  Patient Tolerance:patient tolerated the procedure well with no apparent complications

## 2022-06-20 NOTE — ANESTHESIA POSTPROCEDURE EVALUATION
Patient: Echo Temple    Procedure Summary     Date: 06/20/22 Room / Location: [facility] ENDOSCOPY 4 / [facility] END[facility]COPY    Anesthesia Start: 1409 Anesthesia Stop: 1517    Procedure: ESOPHAGOGASTRODUODENOSCOPY WITH ANESTHESIA (N/A ) Diagnosis:     Surgeons: Rene Brice MD Provider: Abeba Temple CRNA    Anesthesia Type: MAC ASA Status: 3 - Emergent          Anesthesia Type: MAC    Vitals  No vitals data found for the desired time range.          Post Anesthesia Care and Evaluation      Comments: To OR for emergent surgery. Same CRNA continuing care

## 2022-06-21 ENCOUNTER — APPOINTMENT (OUTPATIENT)
Dept: GENERAL RADIOLOGY | Facility: HOSPITAL | Age: 82
End: 2022-06-21

## 2022-06-21 LAB
ALBUMIN SERPL-MCNC: 2.8 G/DL (ref 3.5–5.2)
ALBUMIN SERPL-MCNC: 3 G/DL (ref 3.5–5.2)
ALBUMIN SERPL-MCNC: 3.2 G/DL (ref 3.5–5.2)
ALBUMIN/GLOB SERPL: 1.8 G/DL
ALBUMIN/GLOB SERPL: 1.9 G/DL
ALBUMIN/GLOB SERPL: 2.1 G/DL
ALP SERPL-CCNC: 31 U/L (ref 39–117)
ALP SERPL-CCNC: 34 U/L (ref 39–117)
ALP SERPL-CCNC: 34 U/L (ref 39–117)
ALT SERPL W P-5'-P-CCNC: 12 U/L (ref 1–41)
ALT SERPL W P-5'-P-CCNC: 15 U/L (ref 1–41)
ALT SERPL W P-5'-P-CCNC: 19 U/L (ref 1–41)
ANION GAP SERPL CALCULATED.3IONS-SCNC: 6 MMOL/L (ref 5–15)
ANION GAP SERPL CALCULATED.3IONS-SCNC: 6 MMOL/L (ref 5–15)
ANION GAP SERPL CALCULATED.3IONS-SCNC: 8 MMOL/L (ref 5–15)
ARTERIAL PATENCY WRIST A: ABNORMAL
AST SERPL-CCNC: 12 U/L (ref 1–40)
AST SERPL-CCNC: 16 U/L (ref 1–40)
AST SERPL-CCNC: 18 U/L (ref 1–40)
ATMOSPHERIC PRESS: 754 MMHG
BASE EXCESS BLDA CALC-SCNC: 0.5 MMOL/L (ref 0–2)
BDY SITE: ABNORMAL
BH BB BLOOD EXPIRATION DATE: NORMAL
BH BB BLOOD TYPE BARCODE: 6200
BH BB DISPENSE STATUS: NORMAL
BH BB PRODUCT CODE: NORMAL
BH BB UNIT NUMBER: NORMAL
BILIRUB SERPL-MCNC: 0.2 MG/DL (ref 0–1.2)
BILIRUB SERPL-MCNC: 0.4 MG/DL (ref 0–1.2)
BILIRUB SERPL-MCNC: 0.6 MG/DL (ref 0–1.2)
BODY TEMPERATURE: 37 C
BUN SERPL-MCNC: 54 MG/DL (ref 8–23)
BUN SERPL-MCNC: 56 MG/DL (ref 8–23)
BUN SERPL-MCNC: 57 MG/DL (ref 8–23)
BUN/CREAT SERPL: 50.4 (ref 7–25)
BUN/CREAT SERPL: 52.8 (ref 7–25)
BUN/CREAT SERPL: 55.1 (ref 7–25)
CALCIUM SPEC-SCNC: 7.7 MG/DL (ref 8.6–10.5)
CALCIUM SPEC-SCNC: 7.7 MG/DL (ref 8.6–10.5)
CALCIUM SPEC-SCNC: 7.9 MG/DL (ref 8.6–10.5)
CHLORIDE SERPL-SCNC: 108 MMOL/L (ref 98–107)
CHLORIDE SERPL-SCNC: 109 MMOL/L (ref 98–107)
CHLORIDE SERPL-SCNC: 110 MMOL/L (ref 98–107)
CO2 SERPL-SCNC: 24 MMOL/L (ref 22–29)
CO2 SERPL-SCNC: 26 MMOL/L (ref 22–29)
CO2 SERPL-SCNC: 26 MMOL/L (ref 22–29)
CREAT SERPL-MCNC: 0.98 MG/DL (ref 0.76–1.27)
CREAT SERPL-MCNC: 1.06 MG/DL (ref 0.76–1.27)
CREAT SERPL-MCNC: 1.13 MG/DL (ref 0.76–1.27)
CROSSMATCH INTERPRETATION: NORMAL
DEPRECATED RDW RBC AUTO: 41.6 FL (ref 37–54)
DEPRECATED RDW RBC AUTO: 43.4 FL (ref 37–54)
DEPRECATED RDW RBC AUTO: 45.1 FL (ref 37–54)
EGFRCR SERPLBLD CKD-EPI 2021: 65.3 ML/MIN/1.73
EGFRCR SERPLBLD CKD-EPI 2021: 70.5 ML/MIN/1.73
EGFRCR SERPLBLD CKD-EPI 2021: 77.5 ML/MIN/1.73
ERYTHROCYTE [DISTWIDTH] IN BLOOD BY AUTOMATED COUNT: 13.5 % (ref 12.3–15.4)
ERYTHROCYTE [DISTWIDTH] IN BLOOD BY AUTOMATED COUNT: 13.8 % (ref 12.3–15.4)
ERYTHROCYTE [DISTWIDTH] IN BLOOD BY AUTOMATED COUNT: 14.2 % (ref 12.3–15.4)
GLOBULIN UR ELPH-MCNC: 1.5 GM/DL
GLOBULIN UR ELPH-MCNC: 1.6 GM/DL
GLOBULIN UR ELPH-MCNC: 1.6 GM/DL
GLUCOSE BLDC GLUCOMTR-MCNC: 107 MG/DL (ref 70–130)
GLUCOSE BLDC GLUCOMTR-MCNC: 110 MG/DL (ref 70–130)
GLUCOSE SERPL-MCNC: 125 MG/DL (ref 65–99)
GLUCOSE SERPL-MCNC: 136 MG/DL (ref 65–99)
GLUCOSE SERPL-MCNC: 139 MG/DL (ref 65–99)
HCO3 BLDA-SCNC: 24.8 MMOL/L (ref 20–26)
HCT VFR BLD AUTO: 19.8 % (ref 37.5–51)
HCT VFR BLD AUTO: 22.2 % (ref 37.5–51)
HCT VFR BLD AUTO: 22.9 % (ref 37.5–51)
HGB BLD-MCNC: 7 G/DL (ref 13–17.7)
HGB BLD-MCNC: 7.7 G/DL (ref 13–17.7)
HGB BLD-MCNC: 8.2 G/DL (ref 13–17.7)
INHALED O2 CONCENTRATION: 50 %
Lab: ABNORMAL
MAGNESIUM SERPL-MCNC: 1.5 MG/DL (ref 1.6–2.4)
MCH RBC QN AUTO: 30 PG (ref 26.6–33)
MCH RBC QN AUTO: 30.7 PG (ref 26.6–33)
MCH RBC QN AUTO: 30.8 PG (ref 26.6–33)
MCHC RBC AUTO-ENTMCNC: 34.7 G/DL (ref 31.5–35.7)
MCHC RBC AUTO-ENTMCNC: 35.4 G/DL (ref 31.5–35.7)
MCHC RBC AUTO-ENTMCNC: 35.8 G/DL (ref 31.5–35.7)
MCV RBC AUTO: 85.8 FL (ref 79–97)
MCV RBC AUTO: 86.4 FL (ref 79–97)
MCV RBC AUTO: 87.2 FL (ref 79–97)
MODALITY: ABNORMAL
PCO2 BLDA: 37.5 MM HG (ref 35–45)
PCO2 TEMP ADJ BLD: 37.5 MM HG (ref 35–45)
PEEP RESPIRATORY: 5 CM[H2O]
PH BLDA: 7.43 PH UNITS (ref 7.35–7.45)
PH, TEMP CORRECTED: 7.43 PH UNITS (ref 7.35–7.45)
PLATELET # BLD AUTO: 155 10*3/MM3 (ref 140–450)
PLATELET # BLD AUTO: 180 10*3/MM3 (ref 140–450)
PLATELET # BLD AUTO: 187 10*3/MM3 (ref 140–450)
PMV BLD AUTO: 10 FL (ref 6–12)
PMV BLD AUTO: 10.2 FL (ref 6–12)
PMV BLD AUTO: 10.4 FL (ref 6–12)
PO2 BLDA: 80 MM HG (ref 83–108)
PO2 TEMP ADJ BLD: 80 MM HG (ref 83–108)
POTASSIUM SERPL-SCNC: 3.7 MMOL/L (ref 3.5–5.2)
POTASSIUM SERPL-SCNC: 3.8 MMOL/L (ref 3.5–5.2)
POTASSIUM SERPL-SCNC: 3.8 MMOL/L (ref 3.5–5.2)
PROT SERPL-MCNC: 4.4 G/DL (ref 6–8.5)
PROT SERPL-MCNC: 4.6 G/DL (ref 6–8.5)
PROT SERPL-MCNC: 4.7 G/DL (ref 6–8.5)
RBC # BLD AUTO: 2.27 10*6/MM3 (ref 4.14–5.8)
RBC # BLD AUTO: 2.57 10*6/MM3 (ref 4.14–5.8)
RBC # BLD AUTO: 2.67 10*6/MM3 (ref 4.14–5.8)
SAO2 % BLDCOA: 97.4 % (ref 94–99)
SET MECH RESP RATE: 16
SODIUM SERPL-SCNC: 140 MMOL/L (ref 136–145)
SODIUM SERPL-SCNC: 141 MMOL/L (ref 136–145)
SODIUM SERPL-SCNC: 142 MMOL/L (ref 136–145)
UNIT  ABO: NORMAL
UNIT  RH: NORMAL
VENTILATOR MODE: AC
VT ON VENT VENT: 550 ML
WBC NRBC COR # BLD: 12.21 10*3/MM3 (ref 3.4–10.8)
WBC NRBC COR # BLD: 16.04 10*3/MM3 (ref 3.4–10.8)
WBC NRBC COR # BLD: 16.06 10*3/MM3 (ref 3.4–10.8)

## 2022-06-21 PROCEDURE — 83735 ASSAY OF MAGNESIUM: CPT | Performed by: STUDENT IN AN ORGANIZED HEALTH CARE EDUCATION/TRAINING PROGRAM

## 2022-06-21 PROCEDURE — 82962 GLUCOSE BLOOD TEST: CPT

## 2022-06-21 PROCEDURE — 82803 BLOOD GASES ANY COMBINATION: CPT

## 2022-06-21 PROCEDURE — 99223 1ST HOSP IP/OBS HIGH 75: CPT | Performed by: INTERNAL MEDICINE

## 2022-06-21 PROCEDURE — 94799 UNLISTED PULMONARY SVC/PX: CPT

## 2022-06-21 PROCEDURE — 85027 COMPLETE CBC AUTOMATED: CPT | Performed by: STUDENT IN AN ORGANIZED HEALTH CARE EDUCATION/TRAINING PROGRAM

## 2022-06-21 PROCEDURE — 25010000002 CEFAZOLIN PER 500 MG: Performed by: STUDENT IN AN ORGANIZED HEALTH CARE EDUCATION/TRAINING PROGRAM

## 2022-06-21 PROCEDURE — 25010000002 PROPOFOL 10 MG/ML EMULSION: Performed by: STUDENT IN AN ORGANIZED HEALTH CARE EDUCATION/TRAINING PROGRAM

## 2022-06-21 PROCEDURE — 99024 POSTOP FOLLOW-UP VISIT: CPT | Performed by: STUDENT IN AN ORGANIZED HEALTH CARE EDUCATION/TRAINING PROGRAM

## 2022-06-21 PROCEDURE — 25010000002 FENTANYL CITRATE (PF) 50 MCG/ML SOLUTION: Performed by: STUDENT IN AN ORGANIZED HEALTH CARE EDUCATION/TRAINING PROGRAM

## 2022-06-21 PROCEDURE — 80053 COMPREHEN METABOLIC PANEL: CPT | Performed by: STUDENT IN AN ORGANIZED HEALTH CARE EDUCATION/TRAINING PROGRAM

## 2022-06-21 PROCEDURE — 71045 X-RAY EXAM CHEST 1 VIEW: CPT

## 2022-06-21 RX ORDER — CHLORHEXIDINE GLUCONATE 0.12 MG/ML
15 RINSE ORAL EVERY 12 HOURS SCHEDULED
Status: DISCONTINUED | OUTPATIENT
Start: 2022-06-21 | End: 2022-07-01

## 2022-06-21 RX ADMIN — CHLORHEXIDINE GLUCONATE 15 ML: 1.2 RINSE ORAL at 20:31

## 2022-06-21 RX ADMIN — METRONIDAZOLE 500 MG: 500 INJECTION, SOLUTION INTRAVENOUS at 02:35

## 2022-06-21 RX ADMIN — PANTOPRAZOLE SODIUM 8 MG/HR: 40 INJECTION, POWDER, FOR SOLUTION INTRAVENOUS at 22:49

## 2022-06-21 RX ADMIN — PROPOFOL 40 MCG/KG/MIN: 10 INJECTION, EMULSION INTRAVENOUS at 06:34

## 2022-06-21 RX ADMIN — CEFAZOLIN SODIUM 2 G: 10 INJECTION, POWDER, FOR SOLUTION INTRAVENOUS at 00:20

## 2022-06-21 RX ADMIN — CEFAZOLIN SODIUM 2 G: 10 INJECTION, POWDER, FOR SOLUTION INTRAVENOUS at 15:59

## 2022-06-21 RX ADMIN — FENTANYL CITRATE 25 MCG: 50 INJECTION INTRAMUSCULAR; INTRAVENOUS at 16:10

## 2022-06-21 RX ADMIN — PROPOFOL 30 MCG/KG/MIN: 10 INJECTION, EMULSION INTRAVENOUS at 19:20

## 2022-06-21 RX ADMIN — FENTANYL CITRATE 25 MCG: 50 INJECTION INTRAMUSCULAR; INTRAVENOUS at 14:00

## 2022-06-21 RX ADMIN — PROPOFOL 20 MCG/KG/MIN: 10 INJECTION, EMULSION INTRAVENOUS at 13:25

## 2022-06-21 RX ADMIN — PANTOPRAZOLE SODIUM 8 MG/HR: 40 INJECTION, POWDER, FOR SOLUTION INTRAVENOUS at 08:21

## 2022-06-21 RX ADMIN — CHLORHEXIDINE GLUCONATE 15 ML: 1.2 RINSE ORAL at 08:40

## 2022-06-21 RX ADMIN — CEFAZOLIN SODIUM 2 G: 10 INJECTION, POWDER, FOR SOLUTION INTRAVENOUS at 08:21

## 2022-06-21 RX ADMIN — SODIUM CHLORIDE, POTASSIUM CHLORIDE, SODIUM LACTATE AND CALCIUM CHLORIDE 75 ML/HR: 600; 310; 30; 20 INJECTION, SOLUTION INTRAVENOUS at 08:21

## 2022-06-21 RX ADMIN — PANTOPRAZOLE SODIUM 8 MG/HR: 40 INJECTION, POWDER, FOR SOLUTION INTRAVENOUS at 02:35

## 2022-06-21 RX ADMIN — METRONIDAZOLE 500 MG: 500 INJECTION, SOLUTION INTRAVENOUS at 18:38

## 2022-06-21 RX ADMIN — METRONIDAZOLE 500 MG: 500 INJECTION, SOLUTION INTRAVENOUS at 11:48

## 2022-06-21 RX ADMIN — PANTOPRAZOLE SODIUM 8 MG/HR: 40 INJECTION, POWDER, FOR SOLUTION INTRAVENOUS at 13:25

## 2022-06-21 RX ADMIN — PROPOFOL 40 MCG/KG/MIN: 10 INJECTION, EMULSION INTRAVENOUS at 02:35

## 2022-06-21 RX ADMIN — LEUCINE, PHENYLALANINE, LYSINE, METHIONINE, ISOLEUCINE, VALINE, HISTIDINE, THREONINE, TRYPTOPHAN, ALANINE, GLYCINE, ARGININE, PROLINE, SERINE, TYROSINE, SODIUM ACETATE, DIBASIC POTASSIUM PHOSPHATE, MAGNESIUM CHLORIDE, SODIUM CHLORIDE, CALCIUM CHLORIDE, DEXTROSE
365; 280; 290; 200; 300; 290; 240; 210; 90; 1035; 515; 575; 340; 250; 20; 340; 261; 51; 59; 33; 15 INJECTION INTRAVENOUS at 18:39

## 2022-06-22 LAB
ALBUMIN SERPL-MCNC: 2.8 G/DL (ref 3.5–5.2)
ALBUMIN SERPL-MCNC: 2.9 G/DL (ref 3.5–5.2)
ALBUMIN/GLOB SERPL: 1.5 G/DL
ALBUMIN/GLOB SERPL: 1.8 G/DL
ALP SERPL-CCNC: 31 U/L (ref 39–117)
ALP SERPL-CCNC: 35 U/L (ref 39–117)
ALT SERPL W P-5'-P-CCNC: 10 U/L (ref 1–41)
ALT SERPL W P-5'-P-CCNC: 9 U/L (ref 1–41)
ANION GAP SERPL CALCULATED.3IONS-SCNC: 5 MMOL/L (ref 5–15)
ANION GAP SERPL CALCULATED.3IONS-SCNC: 6 MMOL/L (ref 5–15)
ARTERIAL PATENCY WRIST A: ABNORMAL
AST SERPL-CCNC: 11 U/L (ref 1–40)
AST SERPL-CCNC: 11 U/L (ref 1–40)
ATMOSPHERIC PRESS: 752 MMHG
BASE EXCESS BLDA CALC-SCNC: 4.1 MMOL/L (ref 0–2)
BDY SITE: ABNORMAL
BILIRUB SERPL-MCNC: 0.2 MG/DL (ref 0–1.2)
BILIRUB SERPL-MCNC: <0.2 MG/DL (ref 0–1.2)
BODY TEMPERATURE: 37 C
BUN SERPL-MCNC: 45 MG/DL (ref 8–23)
BUN SERPL-MCNC: 55 MG/DL (ref 8–23)
BUN/CREAT SERPL: 45.9 (ref 7–25)
BUN/CREAT SERPL: 55 (ref 7–25)
CA-I BLD-MCNC: 4.61 MG/DL (ref 4.6–5.4)
CALCIUM SPEC-SCNC: 7.9 MG/DL (ref 8.6–10.5)
CALCIUM SPEC-SCNC: 8 MG/DL (ref 8.6–10.5)
CHLORIDE SERPL-SCNC: 108 MMOL/L (ref 98–107)
CHLORIDE SERPL-SCNC: 110 MMOL/L (ref 98–107)
CHOLEST SERPL-MCNC: 87 MG/DL (ref 0–200)
CO2 SERPL-SCNC: 27 MMOL/L (ref 22–29)
CO2 SERPL-SCNC: 27 MMOL/L (ref 22–29)
CREAT SERPL-MCNC: 0.98 MG/DL (ref 0.76–1.27)
CREAT SERPL-MCNC: 1 MG/DL (ref 0.76–1.27)
CRP SERPL-MCNC: 6.05 MG/DL (ref 0–0.5)
DEPRECATED RDW RBC AUTO: 45.9 FL (ref 37–54)
DEPRECATED RDW RBC AUTO: 45.9 FL (ref 37–54)
EGFRCR SERPLBLD CKD-EPI 2021: 75.6 ML/MIN/1.73
EGFRCR SERPLBLD CKD-EPI 2021: 77.5 ML/MIN/1.73
ERYTHROCYTE [DISTWIDTH] IN BLOOD BY AUTOMATED COUNT: 14.2 % (ref 12.3–15.4)
ERYTHROCYTE [DISTWIDTH] IN BLOOD BY AUTOMATED COUNT: 14.2 % (ref 12.3–15.4)
GLOBULIN UR ELPH-MCNC: 1.6 GM/DL
GLOBULIN UR ELPH-MCNC: 1.9 GM/DL
GLUCOSE BLDC GLUCOMTR-MCNC: 121 MG/DL (ref 70–130)
GLUCOSE BLDC GLUCOMTR-MCNC: 121 MG/DL (ref 70–130)
GLUCOSE BLDC GLUCOMTR-MCNC: 126 MG/DL (ref 70–130)
GLUCOSE BLDC GLUCOMTR-MCNC: 84 MG/DL (ref 70–130)
GLUCOSE BLDC GLUCOMTR-MCNC: 97 MG/DL (ref 70–130)
GLUCOSE SERPL-MCNC: 130 MG/DL (ref 65–99)
GLUCOSE SERPL-MCNC: 136 MG/DL (ref 65–99)
HCO3 BLDA-SCNC: 28.1 MMOL/L (ref 20–26)
HCT VFR BLD AUTO: 20.3 % (ref 37.5–51)
HCT VFR BLD AUTO: 22 % (ref 37.5–51)
HGB BLD-MCNC: 7 G/DL (ref 13–17.7)
HGB BLD-MCNC: 7.5 G/DL (ref 13–17.7)
INHALED O2 CONCENTRATION: 30 %
Lab: ABNORMAL
Lab: NORMAL
MAGNESIUM SERPL-MCNC: 1.9 MG/DL (ref 1.6–2.4)
MCH RBC QN AUTO: 30.2 PG (ref 26.6–33)
MCH RBC QN AUTO: 30.6 PG (ref 26.6–33)
MCHC RBC AUTO-ENTMCNC: 34.1 G/DL (ref 31.5–35.7)
MCHC RBC AUTO-ENTMCNC: 34.5 G/DL (ref 31.5–35.7)
MCV RBC AUTO: 88.6 FL (ref 79–97)
MCV RBC AUTO: 88.7 FL (ref 79–97)
MODALITY: ABNORMAL
PAW @ PEAK INSP FLOW SETTING VENT: 19 CMH2O
PCO2 BLDA: 38.9 MM HG (ref 35–45)
PCO2 TEMP ADJ BLD: 38.9 MM HG (ref 35–45)
PEEP RESPIRATORY: 5 CM[H2O]
PH BLDA: 7.47 PH UNITS (ref 7.35–7.45)
PH, TEMP CORRECTED: 7.47 PH UNITS (ref 7.35–7.45)
PHOSPHATE SERPL-MCNC: 2 MG/DL (ref 2.5–4.5)
PLATELET # BLD AUTO: 156 10*3/MM3 (ref 140–450)
PLATELET # BLD AUTO: 160 10*3/MM3 (ref 140–450)
PMV BLD AUTO: 10.6 FL (ref 6–12)
PMV BLD AUTO: 10.6 FL (ref 6–12)
PO2 BLDA: 89.2 MM HG (ref 83–108)
PO2 TEMP ADJ BLD: 89.2 MM HG (ref 83–108)
POTASSIUM SERPL-SCNC: 3.5 MMOL/L (ref 3.5–5.2)
POTASSIUM SERPL-SCNC: 3.8 MMOL/L (ref 3.5–5.2)
PREALB SERPL-MCNC: 13.4 MG/DL (ref 20–40)
PROT SERPL-MCNC: 4.5 G/DL (ref 6–8.5)
PROT SERPL-MCNC: 4.7 G/DL (ref 6–8.5)
RBC # BLD AUTO: 2.29 10*6/MM3 (ref 4.14–5.8)
RBC # BLD AUTO: 2.48 10*6/MM3 (ref 4.14–5.8)
SAO2 % BLDCOA: 98.2 % (ref 94–99)
SET MECH RESP RATE: 16
SODIUM SERPL-SCNC: 141 MMOL/L (ref 136–145)
SODIUM SERPL-SCNC: 142 MMOL/L (ref 136–145)
TRIGL SERPL-MCNC: 128 MG/DL (ref 0–150)
VENTILATOR MODE: ABNORMAL
WBC NRBC COR # BLD: 11.08 10*3/MM3 (ref 3.4–10.8)
WBC NRBC COR # BLD: 12.42 10*3/MM3 (ref 3.4–10.8)

## 2022-06-22 PROCEDURE — 84100 ASSAY OF PHOSPHORUS: CPT | Performed by: STUDENT IN AN ORGANIZED HEALTH CARE EDUCATION/TRAINING PROGRAM

## 2022-06-22 PROCEDURE — 94003 VENT MGMT INPAT SUBQ DAY: CPT

## 2022-06-22 PROCEDURE — 25010000002 CEFAZOLIN PER 500 MG: Performed by: STUDENT IN AN ORGANIZED HEALTH CARE EDUCATION/TRAINING PROGRAM

## 2022-06-22 PROCEDURE — 94761 N-INVAS EAR/PLS OXIMETRY MLT: CPT

## 2022-06-22 PROCEDURE — 94799 UNLISTED PULMONARY SVC/PX: CPT

## 2022-06-22 PROCEDURE — 84134 ASSAY OF PREALBUMIN: CPT | Performed by: STUDENT IN AN ORGANIZED HEALTH CARE EDUCATION/TRAINING PROGRAM

## 2022-06-22 PROCEDURE — 25010000002 PROPOFOL 10 MG/ML EMULSION: Performed by: STUDENT IN AN ORGANIZED HEALTH CARE EDUCATION/TRAINING PROGRAM

## 2022-06-22 PROCEDURE — 86140 C-REACTIVE PROTEIN: CPT | Performed by: STUDENT IN AN ORGANIZED HEALTH CARE EDUCATION/TRAINING PROGRAM

## 2022-06-22 PROCEDURE — 99024 POSTOP FOLLOW-UP VISIT: CPT | Performed by: STUDENT IN AN ORGANIZED HEALTH CARE EDUCATION/TRAINING PROGRAM

## 2022-06-22 PROCEDURE — 85027 COMPLETE CBC AUTOMATED: CPT | Performed by: STUDENT IN AN ORGANIZED HEALTH CARE EDUCATION/TRAINING PROGRAM

## 2022-06-22 PROCEDURE — 83735 ASSAY OF MAGNESIUM: CPT | Performed by: STUDENT IN AN ORGANIZED HEALTH CARE EDUCATION/TRAINING PROGRAM

## 2022-06-22 PROCEDURE — 82962 GLUCOSE BLOOD TEST: CPT

## 2022-06-22 PROCEDURE — 82803 BLOOD GASES ANY COMBINATION: CPT

## 2022-06-22 PROCEDURE — 80053 COMPREHEN METABOLIC PANEL: CPT | Performed by: STUDENT IN AN ORGANIZED HEALTH CARE EDUCATION/TRAINING PROGRAM

## 2022-06-22 PROCEDURE — 84478 ASSAY OF TRIGLYCERIDES: CPT | Performed by: STUDENT IN AN ORGANIZED HEALTH CARE EDUCATION/TRAINING PROGRAM

## 2022-06-22 PROCEDURE — 99233 SBSQ HOSP IP/OBS HIGH 50: CPT | Performed by: INTERNAL MEDICINE

## 2022-06-22 PROCEDURE — 82465 ASSAY BLD/SERUM CHOLESTEROL: CPT | Performed by: STUDENT IN AN ORGANIZED HEALTH CARE EDUCATION/TRAINING PROGRAM

## 2022-06-22 PROCEDURE — 82330 ASSAY OF CALCIUM: CPT

## 2022-06-22 RX ORDER — IPRATROPIUM BROMIDE AND ALBUTEROL SULFATE 2.5; .5 MG/3ML; MG/3ML
3 SOLUTION RESPIRATORY (INHALATION)
Status: DISCONTINUED | OUTPATIENT
Start: 2022-06-22 | End: 2022-06-24

## 2022-06-22 RX ORDER — ACETYLCYSTEINE 200 MG/ML
1.5 SOLUTION ORAL; RESPIRATORY (INHALATION)
Status: DISCONTINUED | OUTPATIENT
Start: 2022-06-22 | End: 2022-06-23

## 2022-06-22 RX ADMIN — CEFAZOLIN SODIUM 2 G: 10 INJECTION, POWDER, FOR SOLUTION INTRAVENOUS at 00:12

## 2022-06-22 RX ADMIN — CEFAZOLIN SODIUM 2 G: 10 INJECTION, POWDER, FOR SOLUTION INTRAVENOUS at 08:59

## 2022-06-22 RX ADMIN — CHLORHEXIDINE GLUCONATE 15 ML: 1.2 RINSE ORAL at 08:59

## 2022-06-22 RX ADMIN — CHLORHEXIDINE GLUCONATE 15 ML: 1.2 RINSE ORAL at 20:22

## 2022-06-22 RX ADMIN — IPRATROPIUM BROMIDE AND ALBUTEROL SULFATE 3 ML: .5; 3 SOLUTION RESPIRATORY (INHALATION) at 19:57

## 2022-06-22 RX ADMIN — PANTOPRAZOLE SODIUM 8 MG/HR: 40 INJECTION, POWDER, FOR SOLUTION INTRAVENOUS at 08:58

## 2022-06-22 RX ADMIN — PANTOPRAZOLE SODIUM 8 MG/HR: 40 INJECTION, POWDER, FOR SOLUTION INTRAVENOUS at 14:50

## 2022-06-22 RX ADMIN — PROPOFOL 30 MCG/KG/MIN: 10 INJECTION, EMULSION INTRAVENOUS at 14:09

## 2022-06-22 RX ADMIN — METRONIDAZOLE 500 MG: 500 INJECTION, SOLUTION INTRAVENOUS at 11:50

## 2022-06-22 RX ADMIN — ACETYLCYSTEINE 1.5 ML: 200 SOLUTION ORAL; RESPIRATORY (INHALATION) at 19:57

## 2022-06-22 RX ADMIN — PANTOPRAZOLE SODIUM 8 MG/HR: 40 INJECTION, POWDER, FOR SOLUTION INTRAVENOUS at 23:49

## 2022-06-22 RX ADMIN — ASCORBIC ACID, VITAMIN A PALMITATE, CHOLECALCIFEROL, THIAMINE HYDROCHLORIDE, RIBOFLAVIN-5 PHOSPHATE SODIUM, PYRIDOXINE HYDROCHLORIDE, NIACINAMIDE, DEXPANTHENOL, ALPHA-TOCOPHEROL ACETATE, VITAMIN K1, FOLIC ACID, BIOTIN, CYANOCOBALAMIN: 200; 3300; 200; 6; 3.6; 6; 40; 15; 10; 150; 600; 60; 5 INJECTION, SOLUTION INTRAVENOUS at 18:24

## 2022-06-22 RX ADMIN — PROPOFOL 35 MCG/KG/MIN: 10 INJECTION, EMULSION INTRAVENOUS at 07:29

## 2022-06-22 RX ADMIN — SODIUM CHLORIDE, POTASSIUM CHLORIDE, SODIUM LACTATE AND CALCIUM CHLORIDE 75 ML/HR: 600; 310; 30; 20 INJECTION, SOLUTION INTRAVENOUS at 00:12

## 2022-06-22 RX ADMIN — METRONIDAZOLE 500 MG: 500 INJECTION, SOLUTION INTRAVENOUS at 18:24

## 2022-06-22 RX ADMIN — CEFAZOLIN SODIUM 2 G: 10 INJECTION, POWDER, FOR SOLUTION INTRAVENOUS at 23:49

## 2022-06-22 RX ADMIN — METRONIDAZOLE 500 MG: 500 INJECTION, SOLUTION INTRAVENOUS at 02:21

## 2022-06-22 RX ADMIN — PROPOFOL 30 MCG/KG/MIN: 10 INJECTION, EMULSION INTRAVENOUS at 02:21

## 2022-06-22 RX ADMIN — PROPOFOL 30 MCG/KG/MIN: 10 INJECTION, EMULSION INTRAVENOUS at 19:36

## 2022-06-22 RX ADMIN — PANTOPRAZOLE SODIUM 8 MG/HR: 40 INJECTION, POWDER, FOR SOLUTION INTRAVENOUS at 19:34

## 2022-06-22 RX ADMIN — CEFAZOLIN SODIUM 2 G: 10 INJECTION, POWDER, FOR SOLUTION INTRAVENOUS at 16:49

## 2022-06-23 ENCOUNTER — APPOINTMENT (OUTPATIENT)
Dept: GENERAL RADIOLOGY | Facility: HOSPITAL | Age: 82
End: 2022-06-23

## 2022-06-23 LAB
ALBUMIN SERPL-MCNC: 2.9 G/DL (ref 3.5–5.2)
ALBUMIN SERPL-MCNC: 3.3 G/DL (ref 3.5–5.2)
ALBUMIN/GLOB SERPL: 1.5 G/DL
ALBUMIN/GLOB SERPL: 1.6 G/DL
ALP SERPL-CCNC: 38 U/L (ref 39–117)
ALP SERPL-CCNC: 49 U/L (ref 39–117)
ALT SERPL W P-5'-P-CCNC: 8 U/L (ref 1–41)
ALT SERPL W P-5'-P-CCNC: 8 U/L (ref 1–41)
ANION GAP SERPL CALCULATED.3IONS-SCNC: 7 MMOL/L (ref 5–15)
ANION GAP SERPL CALCULATED.3IONS-SCNC: 7 MMOL/L (ref 5–15)
ARTERIAL PATENCY WRIST A: POSITIVE
AST SERPL-CCNC: 11 U/L (ref 1–40)
AST SERPL-CCNC: 13 U/L (ref 1–40)
ATMOSPHERIC PRESS: 750 MMHG
BASE EXCESS BLDA CALC-SCNC: 4.8 MMOL/L (ref 0–2)
BDY SITE: ABNORMAL
BILIRUB SERPL-MCNC: 0.3 MG/DL (ref 0–1.2)
BILIRUB SERPL-MCNC: <0.2 MG/DL (ref 0–1.2)
BODY TEMPERATURE: 37 C
BUN SERPL-MCNC: 29 MG/DL (ref 8–23)
BUN SERPL-MCNC: 39 MG/DL (ref 8–23)
BUN/CREAT SERPL: 35.4 (ref 7–25)
BUN/CREAT SERPL: 40.6 (ref 7–25)
CA-I BLD-MCNC: 4.56 MG/DL (ref 4.6–5.4)
CALCIUM SPEC-SCNC: 8.1 MG/DL (ref 8.6–10.5)
CALCIUM SPEC-SCNC: 8.4 MG/DL (ref 8.6–10.5)
CHLORIDE SERPL-SCNC: 108 MMOL/L (ref 98–107)
CHLORIDE SERPL-SCNC: 108 MMOL/L (ref 98–107)
CO2 SERPL-SCNC: 28 MMOL/L (ref 22–29)
CO2 SERPL-SCNC: 28 MMOL/L (ref 22–29)
CREAT SERPL-MCNC: 0.82 MG/DL (ref 0.76–1.27)
CREAT SERPL-MCNC: 0.96 MG/DL (ref 0.76–1.27)
DEPRECATED RDW RBC AUTO: 47 FL (ref 37–54)
DEPRECATED RDW RBC AUTO: 47.5 FL (ref 37–54)
EGFRCR SERPLBLD CKD-EPI 2021: 78.9 ML/MIN/1.73
EGFRCR SERPLBLD CKD-EPI 2021: 87.7 ML/MIN/1.73
ERYTHROCYTE [DISTWIDTH] IN BLOOD BY AUTOMATED COUNT: 13.9 % (ref 12.3–15.4)
ERYTHROCYTE [DISTWIDTH] IN BLOOD BY AUTOMATED COUNT: 14.1 % (ref 12.3–15.4)
GLOBULIN UR ELPH-MCNC: 2 GM/DL
GLOBULIN UR ELPH-MCNC: 2.1 GM/DL
GLUCOSE BLDC GLUCOMTR-MCNC: 105 MG/DL (ref 70–130)
GLUCOSE BLDC GLUCOMTR-MCNC: 117 MG/DL (ref 70–130)
GLUCOSE BLDC GLUCOMTR-MCNC: 123 MG/DL (ref 70–130)
GLUCOSE BLDC GLUCOMTR-MCNC: 128 MG/DL (ref 70–130)
GLUCOSE SERPL-MCNC: 130 MG/DL (ref 65–99)
GLUCOSE SERPL-MCNC: 140 MG/DL (ref 65–99)
HCO3 BLDA-SCNC: 28.7 MMOL/L (ref 20–26)
HCT VFR BLD AUTO: 23.4 % (ref 37.5–51)
HCT VFR BLD AUTO: 25.4 % (ref 37.5–51)
HGB BLD-MCNC: 7.7 G/DL (ref 13–17.7)
HGB BLD-MCNC: 8.3 G/DL (ref 13–17.7)
INHALED O2 CONCENTRATION: 30 %
Lab: ABNORMAL
Lab: ABNORMAL
MAGNESIUM SERPL-MCNC: 1.9 MG/DL (ref 1.6–2.4)
MCH RBC QN AUTO: 30 PG (ref 26.6–33)
MCH RBC QN AUTO: 30.1 PG (ref 26.6–33)
MCHC RBC AUTO-ENTMCNC: 32.7 G/DL (ref 31.5–35.7)
MCHC RBC AUTO-ENTMCNC: 32.9 G/DL (ref 31.5–35.7)
MCV RBC AUTO: 91.4 FL (ref 79–97)
MCV RBC AUTO: 91.7 FL (ref 79–97)
MODALITY: ABNORMAL
PAW @ PEAK INSP FLOW SETTING VENT: 14 CMH2O
PCO2 BLDA: 38.7 MM HG (ref 35–45)
PCO2 TEMP ADJ BLD: 38.7 MM HG (ref 35–45)
PEEP RESPIRATORY: 5 CM[H2O]
PH BLDA: 7.48 PH UNITS (ref 7.35–7.45)
PH, TEMP CORRECTED: 7.48 PH UNITS (ref 7.35–7.45)
PHOSPHATE SERPL-MCNC: 2 MG/DL (ref 2.5–4.5)
PLATELET # BLD AUTO: 172 10*3/MM3 (ref 140–450)
PLATELET # BLD AUTO: 201 10*3/MM3 (ref 140–450)
PMV BLD AUTO: 10.3 FL (ref 6–12)
PMV BLD AUTO: 10.7 FL (ref 6–12)
PO2 BLDA: 86.8 MM HG (ref 83–108)
PO2 TEMP ADJ BLD: 86.8 MM HG (ref 83–108)
POTASSIUM SERPL-SCNC: 3.5 MMOL/L (ref 3.5–5.2)
POTASSIUM SERPL-SCNC: 3.6 MMOL/L (ref 3.5–5.2)
PROT SERPL-MCNC: 4.9 G/DL (ref 6–8.5)
PROT SERPL-MCNC: 5.4 G/DL (ref 6–8.5)
RBC # BLD AUTO: 2.56 10*6/MM3 (ref 4.14–5.8)
RBC # BLD AUTO: 2.77 10*6/MM3 (ref 4.14–5.8)
SAO2 % BLDCOA: 98.3 % (ref 94–99)
SET MECH RESP RATE: 14
SODIUM SERPL-SCNC: 143 MMOL/L (ref 136–145)
SODIUM SERPL-SCNC: 143 MMOL/L (ref 136–145)
VENTILATOR MODE: ABNORMAL
WBC NRBC COR # BLD: 11.45 10*3/MM3 (ref 3.4–10.8)
WBC NRBC COR # BLD: 11.49 10*3/MM3 (ref 3.4–10.8)

## 2022-06-23 PROCEDURE — 94799 UNLISTED PULMONARY SVC/PX: CPT

## 2022-06-23 PROCEDURE — 83735 ASSAY OF MAGNESIUM: CPT | Performed by: STUDENT IN AN ORGANIZED HEALTH CARE EDUCATION/TRAINING PROGRAM

## 2022-06-23 PROCEDURE — 99233 SBSQ HOSP IP/OBS HIGH 50: CPT | Performed by: INTERNAL MEDICINE

## 2022-06-23 PROCEDURE — 0 HYDROMORPHONE 1 MG/ML SOLUTION: Performed by: STUDENT IN AN ORGANIZED HEALTH CARE EDUCATION/TRAINING PROGRAM

## 2022-06-23 PROCEDURE — 25010000002 HYDRALAZINE PER 20 MG: Performed by: INTERNAL MEDICINE

## 2022-06-23 PROCEDURE — 84100 ASSAY OF PHOSPHORUS: CPT | Performed by: STUDENT IN AN ORGANIZED HEALTH CARE EDUCATION/TRAINING PROGRAM

## 2022-06-23 PROCEDURE — 36600 WITHDRAWAL OF ARTERIAL BLOOD: CPT

## 2022-06-23 PROCEDURE — 99024 POSTOP FOLLOW-UP VISIT: CPT | Performed by: STUDENT IN AN ORGANIZED HEALTH CARE EDUCATION/TRAINING PROGRAM

## 2022-06-23 PROCEDURE — 82962 GLUCOSE BLOOD TEST: CPT

## 2022-06-23 PROCEDURE — 82803 BLOOD GASES ANY COMBINATION: CPT

## 2022-06-23 PROCEDURE — 25010000002 PROPOFOL 10 MG/ML EMULSION: Performed by: STUDENT IN AN ORGANIZED HEALTH CARE EDUCATION/TRAINING PROGRAM

## 2022-06-23 PROCEDURE — 82330 ASSAY OF CALCIUM: CPT

## 2022-06-23 PROCEDURE — 80053 COMPREHEN METABOLIC PANEL: CPT | Performed by: STUDENT IN AN ORGANIZED HEALTH CARE EDUCATION/TRAINING PROGRAM

## 2022-06-23 PROCEDURE — 85027 COMPLETE CBC AUTOMATED: CPT | Performed by: STUDENT IN AN ORGANIZED HEALTH CARE EDUCATION/TRAINING PROGRAM

## 2022-06-23 PROCEDURE — 71045 X-RAY EXAM CHEST 1 VIEW: CPT

## 2022-06-23 PROCEDURE — 25010000002 CEFAZOLIN PER 500 MG: Performed by: STUDENT IN AN ORGANIZED HEALTH CARE EDUCATION/TRAINING PROGRAM

## 2022-06-23 RX ORDER — LABETALOL HYDROCHLORIDE 5 MG/ML
10 INJECTION, SOLUTION INTRAVENOUS EVERY 4 HOURS PRN
Status: DISCONTINUED | OUTPATIENT
Start: 2022-06-23 | End: 2022-07-06 | Stop reason: HOSPADM

## 2022-06-23 RX ORDER — PANTOPRAZOLE SODIUM 40 MG/10ML
40 INJECTION, POWDER, LYOPHILIZED, FOR SOLUTION INTRAVENOUS EVERY 12 HOURS SCHEDULED
Status: COMPLETED | OUTPATIENT
Start: 2022-06-23 | End: 2022-07-02

## 2022-06-23 RX ORDER — LIDOCAINE 50 MG/G
2 PATCH TOPICAL
Status: DISCONTINUED | OUTPATIENT
Start: 2022-06-23 | End: 2022-07-06 | Stop reason: HOSPADM

## 2022-06-23 RX ORDER — HYDRALAZINE HYDROCHLORIDE 20 MG/ML
10 INJECTION INTRAMUSCULAR; INTRAVENOUS EVERY 4 HOURS PRN
Status: DISCONTINUED | OUTPATIENT
Start: 2022-06-23 | End: 2022-07-05

## 2022-06-23 RX ADMIN — PROPOFOL 40 MCG/KG/MIN: 10 INJECTION, EMULSION INTRAVENOUS at 00:25

## 2022-06-23 RX ADMIN — IPRATROPIUM BROMIDE AND ALBUTEROL SULFATE 3 ML: .5; 3 SOLUTION RESPIRATORY (INHALATION) at 15:18

## 2022-06-23 RX ADMIN — PANTOPRAZOLE SODIUM 8 MG/HR: 40 INJECTION, POWDER, FOR SOLUTION INTRAVENOUS at 09:39

## 2022-06-23 RX ADMIN — CEFAZOLIN SODIUM 2 G: 10 INJECTION, POWDER, FOR SOLUTION INTRAVENOUS at 16:12

## 2022-06-23 RX ADMIN — PANTOPRAZOLE SODIUM 8 MG/HR: 40 INJECTION, POWDER, FOR SOLUTION INTRAVENOUS at 15:12

## 2022-06-23 RX ADMIN — METRONIDAZOLE 500 MG: 500 INJECTION, SOLUTION INTRAVENOUS at 03:41

## 2022-06-23 RX ADMIN — IPRATROPIUM BROMIDE AND ALBUTEROL SULFATE 3 ML: .5; 3 SOLUTION RESPIRATORY (INHALATION) at 19:53

## 2022-06-23 RX ADMIN — PANTOPRAZOLE SODIUM 8 MG/HR: 40 INJECTION, POWDER, FOR SOLUTION INTRAVENOUS at 04:39

## 2022-06-23 RX ADMIN — LIDOCAINE 2 PATCH: 50 PATCH CUTANEOUS at 16:12

## 2022-06-23 RX ADMIN — ACETYLCYSTEINE 1.5 ML: 200 SOLUTION ORAL; RESPIRATORY (INHALATION) at 05:21

## 2022-06-23 RX ADMIN — HYDRALAZINE HYDROCHLORIDE 10 MG: 20 INJECTION INTRAMUSCULAR; INTRAVENOUS at 20:17

## 2022-06-23 RX ADMIN — HYDROMORPHONE HYDROCHLORIDE 0.5 MG: 1 INJECTION, SOLUTION INTRAMUSCULAR; INTRAVENOUS; SUBCUTANEOUS at 15:27

## 2022-06-23 RX ADMIN — METRONIDAZOLE 500 MG: 500 INJECTION, SOLUTION INTRAVENOUS at 10:50

## 2022-06-23 RX ADMIN — PANTOPRAZOLE SODIUM 40 MG: 40 INJECTION, POWDER, FOR SOLUTION INTRAVENOUS at 20:17

## 2022-06-23 RX ADMIN — IPRATROPIUM BROMIDE AND ALBUTEROL SULFATE 3 ML: .5; 3 SOLUTION RESPIRATORY (INHALATION) at 05:21

## 2022-06-23 RX ADMIN — LEUCINE, PHENYLALANINE, LYSINE, METHIONINE, ISOLEUCINE, VALINE, HISTIDINE, THREONINE, TRYPTOPHAN, ALANINE, GLYCINE, ARGININE, PROLINE, SERINE, TYROSINE, SODIUM ACETATE, DIBASIC POTASSIUM PHOSPHATE, MAGNESIUM CHLORIDE, SODIUM CHLORIDE, CALCIUM CHLORIDE, DEXTROSE
365; 280; 290; 200; 300; 290; 240; 210; 90; 1035; 515; 575; 340; 250; 20; 340; 261; 51; 59; 33; 15 INJECTION INTRAVENOUS at 17:28

## 2022-06-23 RX ADMIN — CHLORHEXIDINE GLUCONATE 15 ML: 1.2 RINSE ORAL at 08:07

## 2022-06-23 RX ADMIN — HYDROMORPHONE HYDROCHLORIDE 0.5 MG: 1 INJECTION, SOLUTION INTRAMUSCULAR; INTRAVENOUS; SUBCUTANEOUS at 23:27

## 2022-06-23 RX ADMIN — PROPOFOL 40 MCG/KG/MIN: 10 INJECTION, EMULSION INTRAVENOUS at 03:54

## 2022-06-23 RX ADMIN — IPRATROPIUM BROMIDE AND ALBUTEROL SULFATE 3 ML: .5; 3 SOLUTION RESPIRATORY (INHALATION) at 10:26

## 2022-06-23 RX ADMIN — CEFAZOLIN SODIUM 2 G: 10 INJECTION, POWDER, FOR SOLUTION INTRAVENOUS at 08:07

## 2022-06-23 RX ADMIN — PROPOFOL 40 MCG/KG/MIN: 10 INJECTION, EMULSION INTRAVENOUS at 08:41

## 2022-06-23 RX ADMIN — CEFAZOLIN SODIUM 2 G: 10 INJECTION, POWDER, FOR SOLUTION INTRAVENOUS at 23:19

## 2022-06-23 RX ADMIN — CHLORHEXIDINE GLUCONATE 15 ML: 1.2 RINSE ORAL at 20:17

## 2022-06-24 ENCOUNTER — APPOINTMENT (OUTPATIENT)
Dept: GENERAL RADIOLOGY | Facility: HOSPITAL | Age: 82
End: 2022-06-24

## 2022-06-24 PROBLEM — D62 ACUTE BLOOD LOSS ANEMIA: Status: ACTIVE | Noted: 2022-06-24

## 2022-06-24 LAB
ALBUMIN SERPL-MCNC: 3.3 G/DL (ref 3.5–5.2)
ALBUMIN/GLOB SERPL: 1.6 G/DL
ALP SERPL-CCNC: 51 U/L (ref 39–117)
ALT SERPL W P-5'-P-CCNC: 7 U/L (ref 1–41)
ANION GAP SERPL CALCULATED.3IONS-SCNC: 8 MMOL/L (ref 5–15)
AST SERPL-CCNC: 16 U/L (ref 1–40)
BH BB BLOOD EXPIRATION DATE: NORMAL
BH BB BLOOD TYPE BARCODE: 6200
BH BB DISPENSE STATUS: NORMAL
BH BB PRODUCT CODE: NORMAL
BH BB UNIT NUMBER: NORMAL
BILIRUB SERPL-MCNC: 0.4 MG/DL (ref 0–1.2)
BUN SERPL-MCNC: 25 MG/DL (ref 8–23)
BUN/CREAT SERPL: 35.7 (ref 7–25)
CA-I BLD-MCNC: 4.62 MG/DL (ref 4.6–5.4)
CALCIUM SPEC-SCNC: 8.5 MG/DL (ref 8.6–10.5)
CHLORIDE SERPL-SCNC: 105 MMOL/L (ref 98–107)
CO2 SERPL-SCNC: 27 MMOL/L (ref 22–29)
CREAT SERPL-MCNC: 0.7 MG/DL (ref 0.76–1.27)
CROSSMATCH INTERPRETATION: NORMAL
DEPRECATED RDW RBC AUTO: 45.8 FL (ref 37–54)
EGFRCR SERPLBLD CKD-EPI 2021: 92 ML/MIN/1.73
ERYTHROCYTE [DISTWIDTH] IN BLOOD BY AUTOMATED COUNT: 14 % (ref 12.3–15.4)
GLOBULIN UR ELPH-MCNC: 2.1 GM/DL
GLUCOSE BLDC GLUCOMTR-MCNC: 111 MG/DL (ref 70–130)
GLUCOSE BLDC GLUCOMTR-MCNC: 116 MG/DL (ref 70–130)
GLUCOSE BLDC GLUCOMTR-MCNC: 118 MG/DL (ref 70–130)
GLUCOSE SERPL-MCNC: 120 MG/DL (ref 65–99)
HCT VFR BLD AUTO: 25.3 % (ref 37.5–51)
HGB BLD-MCNC: 8.5 G/DL (ref 13–17.7)
Lab: NORMAL
MAGNESIUM SERPL-MCNC: 1.8 MG/DL (ref 1.6–2.4)
MCH RBC QN AUTO: 30.4 PG (ref 26.6–33)
MCHC RBC AUTO-ENTMCNC: 33.6 G/DL (ref 31.5–35.7)
MCV RBC AUTO: 90.4 FL (ref 79–97)
PHOSPHATE SERPL-MCNC: 2.2 MG/DL (ref 2.5–4.5)
PLATELET # BLD AUTO: 214 10*3/MM3 (ref 140–450)
PMV BLD AUTO: 10.2 FL (ref 6–12)
POTASSIUM SERPL-SCNC: 3.7 MMOL/L (ref 3.5–5.2)
PROT SERPL-MCNC: 5.4 G/DL (ref 6–8.5)
RBC # BLD AUTO: 2.8 10*6/MM3 (ref 4.14–5.8)
SODIUM SERPL-SCNC: 140 MMOL/L (ref 136–145)
UNIT  ABO: NORMAL
UNIT  RH: NORMAL
WBC NRBC COR # BLD: 11.27 10*3/MM3 (ref 3.4–10.8)

## 2022-06-24 PROCEDURE — 99232 SBSQ HOSP IP/OBS MODERATE 35: CPT | Performed by: INTERNAL MEDICINE

## 2022-06-24 PROCEDURE — 85027 COMPLETE CBC AUTOMATED: CPT | Performed by: STUDENT IN AN ORGANIZED HEALTH CARE EDUCATION/TRAINING PROGRAM

## 2022-06-24 PROCEDURE — 25010000002 CEFAZOLIN PER 500 MG: Performed by: STUDENT IN AN ORGANIZED HEALTH CARE EDUCATION/TRAINING PROGRAM

## 2022-06-24 PROCEDURE — 25010000002 HYDRALAZINE PER 20 MG: Performed by: INTERNAL MEDICINE

## 2022-06-24 PROCEDURE — 71045 X-RAY EXAM CHEST 1 VIEW: CPT

## 2022-06-24 PROCEDURE — 84100 ASSAY OF PHOSPHORUS: CPT | Performed by: STUDENT IN AN ORGANIZED HEALTH CARE EDUCATION/TRAINING PROGRAM

## 2022-06-24 PROCEDURE — 94799 UNLISTED PULMONARY SVC/PX: CPT

## 2022-06-24 PROCEDURE — 80053 COMPREHEN METABOLIC PANEL: CPT | Performed by: STUDENT IN AN ORGANIZED HEALTH CARE EDUCATION/TRAINING PROGRAM

## 2022-06-24 PROCEDURE — 82962 GLUCOSE BLOOD TEST: CPT

## 2022-06-24 PROCEDURE — 82330 ASSAY OF CALCIUM: CPT

## 2022-06-24 PROCEDURE — 83735 ASSAY OF MAGNESIUM: CPT | Performed by: STUDENT IN AN ORGANIZED HEALTH CARE EDUCATION/TRAINING PROGRAM

## 2022-06-24 RX ORDER — IPRATROPIUM BROMIDE AND ALBUTEROL SULFATE 2.5; .5 MG/3ML; MG/3ML
3 SOLUTION RESPIRATORY (INHALATION) EVERY 4 HOURS PRN
Status: DISCONTINUED | OUTPATIENT
Start: 2022-06-24 | End: 2022-07-06 | Stop reason: HOSPADM

## 2022-06-24 RX ORDER — ENALAPRILAT 2.5 MG/2ML
0.62 INJECTION INTRAVENOUS EVERY 6 HOURS
Status: DISCONTINUED | OUTPATIENT
Start: 2022-06-24 | End: 2022-06-30

## 2022-06-24 RX ADMIN — PANTOPRAZOLE SODIUM 40 MG: 40 INJECTION, POWDER, FOR SOLUTION INTRAVENOUS at 08:38

## 2022-06-24 RX ADMIN — METRONIDAZOLE 500 MG: 500 INJECTION, SOLUTION INTRAVENOUS at 02:44

## 2022-06-24 RX ADMIN — CHLORHEXIDINE GLUCONATE 15 ML: 1.2 RINSE ORAL at 23:24

## 2022-06-24 RX ADMIN — ASCORBIC ACID, VITAMIN A PALMITATE, CHOLECALCIFEROL, THIAMINE HYDROCHLORIDE, RIBOFLAVIN-5 PHOSPHATE SODIUM, PYRIDOXINE HYDROCHLORIDE, NIACINAMIDE, DEXPANTHENOL, ALPHA-TOCOPHEROL ACETATE, VITAMIN K1, FOLIC ACID, BIOTIN, CYANOCOBALAMIN: 200; 3300; 200; 6; 3.6; 6; 40; 15; 10; 150; 600; 60; 5 INJECTION, SOLUTION INTRAVENOUS at 20:27

## 2022-06-24 RX ADMIN — LIDOCAINE 2 PATCH: 50 PATCH CUTANEOUS at 08:38

## 2022-06-24 RX ADMIN — CHLORHEXIDINE GLUCONATE 15 ML: 1.2 RINSE ORAL at 08:38

## 2022-06-24 RX ADMIN — METRONIDAZOLE 500 MG: 500 INJECTION, SOLUTION INTRAVENOUS at 11:10

## 2022-06-24 RX ADMIN — ENALAPRILAT 0.62 MG: 1.25 INJECTION INTRAVENOUS at 16:19

## 2022-06-24 RX ADMIN — IPRATROPIUM BROMIDE AND ALBUTEROL SULFATE 3 ML: .5; 3 SOLUTION RESPIRATORY (INHALATION) at 07:16

## 2022-06-24 RX ADMIN — ENALAPRILAT 0.62 MG: 1.25 INJECTION INTRAVENOUS at 23:01

## 2022-06-24 RX ADMIN — PANTOPRAZOLE SODIUM 40 MG: 40 INJECTION, POWDER, FOR SOLUTION INTRAVENOUS at 23:24

## 2022-06-24 RX ADMIN — HYDRALAZINE HYDROCHLORIDE 10 MG: 20 INJECTION INTRAMUSCULAR; INTRAVENOUS at 04:29

## 2022-06-24 RX ADMIN — CEFAZOLIN SODIUM 2 G: 10 INJECTION, POWDER, FOR SOLUTION INTRAVENOUS at 08:35

## 2022-06-24 RX ADMIN — IPRATROPIUM BROMIDE AND ALBUTEROL SULFATE 3 ML: .5; 3 SOLUTION RESPIRATORY (INHALATION) at 11:17

## 2022-06-24 RX ADMIN — CEFAZOLIN SODIUM 2 G: 10 INJECTION, POWDER, FOR SOLUTION INTRAVENOUS at 16:20

## 2022-06-25 LAB
BACTERIA SPEC AEROBE CULT: NORMAL
BACTERIA SPEC AEROBE CULT: NORMAL
GLUCOSE BLDC GLUCOMTR-MCNC: 105 MG/DL (ref 70–130)
GLUCOSE BLDC GLUCOMTR-MCNC: 105 MG/DL (ref 70–130)
GLUCOSE BLDC GLUCOMTR-MCNC: 106 MG/DL (ref 70–130)

## 2022-06-25 PROCEDURE — 82962 GLUCOSE BLOOD TEST: CPT

## 2022-06-25 RX ADMIN — I.V. FAT EMULSION 50 G: 20 EMULSION INTRAVENOUS at 17:02

## 2022-06-25 RX ADMIN — ENALAPRILAT 0.62 MG: 1.25 INJECTION INTRAVENOUS at 09:26

## 2022-06-25 RX ADMIN — ENALAPRILAT 0.62 MG: 1.25 INJECTION INTRAVENOUS at 22:33

## 2022-06-25 RX ADMIN — ENALAPRILAT 0.62 MG: 1.25 INJECTION INTRAVENOUS at 17:01

## 2022-06-25 RX ADMIN — PANTOPRAZOLE SODIUM 40 MG: 40 INJECTION, POWDER, FOR SOLUTION INTRAVENOUS at 09:33

## 2022-06-25 RX ADMIN — ENALAPRILAT 0.62 MG: 1.25 INJECTION INTRAVENOUS at 04:26

## 2022-06-25 RX ADMIN — LEUCINE, PHENYLALANINE, LYSINE, METHIONINE, ISOLEUCINE, VALINE, HISTIDINE, THREONINE, TRYPTOPHAN, ALANINE, GLYCINE, ARGININE, PROLINE, SERINE, TYROSINE, SODIUM ACETATE, DIBASIC POTASSIUM PHOSPHATE, MAGNESIUM CHLORIDE, SODIUM CHLORIDE, CALCIUM CHLORIDE, DEXTROSE
365; 280; 290; 200; 300; 290; 240; 210; 90; 1035; 515; 575; 340; 250; 20; 340; 261; 51; 59; 33; 15 INJECTION INTRAVENOUS at 17:01

## 2022-06-25 RX ADMIN — LIDOCAINE 2 PATCH: 50 PATCH CUTANEOUS at 09:29

## 2022-06-25 RX ADMIN — PANTOPRAZOLE SODIUM 40 MG: 40 INJECTION, POWDER, FOR SOLUTION INTRAVENOUS at 22:33

## 2022-06-26 LAB
ALBUMIN SERPL-MCNC: 3.3 G/DL (ref 3.5–5.2)
ALBUMIN/GLOB SERPL: 1.3 G/DL
ALP SERPL-CCNC: 81 U/L (ref 39–117)
ALT SERPL W P-5'-P-CCNC: 18 U/L (ref 1–41)
ANION GAP SERPL CALCULATED.3IONS-SCNC: 8 MMOL/L (ref 5–15)
AST SERPL-CCNC: 47 U/L (ref 1–40)
BILIRUB SERPL-MCNC: 0.5 MG/DL (ref 0–1.2)
BUN SERPL-MCNC: 24 MG/DL (ref 8–23)
BUN/CREAT SERPL: 32 (ref 7–25)
CALCIUM SPEC-SCNC: 8.9 MG/DL (ref 8.6–10.5)
CHLORIDE SERPL-SCNC: 98 MMOL/L (ref 98–107)
CO2 SERPL-SCNC: 27 MMOL/L (ref 22–29)
CREAT SERPL-MCNC: 0.75 MG/DL (ref 0.76–1.27)
DEPRECATED RDW RBC AUTO: 44.3 FL (ref 37–54)
EGFRCR SERPLBLD CKD-EPI 2021: 90.1 ML/MIN/1.73
ERYTHROCYTE [DISTWIDTH] IN BLOOD BY AUTOMATED COUNT: 13.1 % (ref 12.3–15.4)
ERYTHROCYTE [SEDIMENTATION RATE] IN BLOOD: 27 MM/HR (ref 0–20)
GLOBULIN UR ELPH-MCNC: 2.5 GM/DL
GLUCOSE BLDC GLUCOMTR-MCNC: 112 MG/DL (ref 70–130)
GLUCOSE BLDC GLUCOMTR-MCNC: 122 MG/DL (ref 70–130)
GLUCOSE SERPL-MCNC: 115 MG/DL (ref 65–99)
HCT VFR BLD AUTO: 29.1 % (ref 37.5–51)
HGB BLD-MCNC: 9.5 G/DL (ref 13–17.7)
MAGNESIUM SERPL-MCNC: 1.8 MG/DL (ref 1.6–2.4)
MCH RBC QN AUTO: 30.2 PG (ref 26.6–33)
MCHC RBC AUTO-ENTMCNC: 32.6 G/DL (ref 31.5–35.7)
MCV RBC AUTO: 92.4 FL (ref 79–97)
PHOSPHATE SERPL-MCNC: 1.8 MG/DL (ref 2.5–4.5)
PHOSPHATE SERPL-MCNC: 2.4 MG/DL (ref 2.5–4.5)
PLATELET # BLD AUTO: 297 10*3/MM3 (ref 140–450)
PMV BLD AUTO: 10.2 FL (ref 6–12)
POTASSIUM SERPL-SCNC: 3.8 MMOL/L (ref 3.5–5.2)
POTASSIUM SERPL-SCNC: 4.1 MMOL/L (ref 3.5–5.2)
PROT SERPL-MCNC: 5.8 G/DL (ref 6–8.5)
RBC # BLD AUTO: 3.15 10*6/MM3 (ref 4.14–5.8)
SODIUM SERPL-SCNC: 133 MMOL/L (ref 136–145)
WBC NRBC COR # BLD: 15.97 10*3/MM3 (ref 3.4–10.8)

## 2022-06-26 PROCEDURE — 80053 COMPREHEN METABOLIC PANEL: CPT | Performed by: SPECIALIST

## 2022-06-26 PROCEDURE — 84100 ASSAY OF PHOSPHORUS: CPT | Performed by: INTERNAL MEDICINE

## 2022-06-26 PROCEDURE — 97162 PT EVAL MOD COMPLEX 30 MIN: CPT

## 2022-06-26 PROCEDURE — 82962 GLUCOSE BLOOD TEST: CPT

## 2022-06-26 PROCEDURE — 85652 RBC SED RATE AUTOMATED: CPT | Performed by: SPECIALIST

## 2022-06-26 PROCEDURE — 83735 ASSAY OF MAGNESIUM: CPT | Performed by: INTERNAL MEDICINE

## 2022-06-26 PROCEDURE — 84132 ASSAY OF SERUM POTASSIUM: CPT | Performed by: INTERNAL MEDICINE

## 2022-06-26 PROCEDURE — 97166 OT EVAL MOD COMPLEX 45 MIN: CPT | Performed by: OCCUPATIONAL THERAPIST

## 2022-06-26 PROCEDURE — 85027 COMPLETE CBC AUTOMATED: CPT | Performed by: INTERNAL MEDICINE

## 2022-06-26 RX ADMIN — ENALAPRILAT 0.62 MG: 1.25 INJECTION INTRAVENOUS at 17:35

## 2022-06-26 RX ADMIN — ENALAPRILAT 0.62 MG: 1.25 INJECTION INTRAVENOUS at 21:01

## 2022-06-26 RX ADMIN — PANTOPRAZOLE SODIUM 40 MG: 40 INJECTION, POWDER, FOR SOLUTION INTRAVENOUS at 08:53

## 2022-06-26 RX ADMIN — ENALAPRILAT 0.62 MG: 1.25 INJECTION INTRAVENOUS at 05:33

## 2022-06-26 RX ADMIN — PANTOPRAZOLE SODIUM 40 MG: 40 INJECTION, POWDER, FOR SOLUTION INTRAVENOUS at 20:56

## 2022-06-26 RX ADMIN — SODIUM PHOSPHATE, MONOBASIC, MONOHYDRATE 15 MMOL: 276; 142 INJECTION, SOLUTION INTRAVENOUS at 20:55

## 2022-06-26 RX ADMIN — I.V. FAT EMULSION 50 G: 20 EMULSION INTRAVENOUS at 17:26

## 2022-06-26 RX ADMIN — LIDOCAINE 2 PATCH: 50 PATCH CUTANEOUS at 08:53

## 2022-06-26 RX ADMIN — LEUCINE, PHENYLALANINE, LYSINE, METHIONINE, ISOLEUCINE, VALINE, HISTIDINE, THREONINE, TRYPTOPHAN, ALANINE, GLYCINE, ARGININE, PROLINE, SERINE, TYROSINE, SODIUM ACETATE, DIBASIC POTASSIUM PHOSPHATE, MAGNESIUM CHLORIDE, SODIUM CHLORIDE, CALCIUM CHLORIDE, DEXTROSE
365; 280; 290; 200; 300; 290; 240; 210; 90; 1035; 515; 575; 340; 250; 20; 340; 261; 51; 59; 33; 15 INJECTION INTRAVENOUS at 17:26

## 2022-06-26 RX ADMIN — ENALAPRILAT 0.62 MG: 1.25 INJECTION INTRAVENOUS at 10:29

## 2022-06-26 RX ADMIN — CHLORHEXIDINE GLUCONATE 15 ML: 1.2 RINSE ORAL at 08:53

## 2022-06-26 RX ADMIN — POTASSIUM PHOSPHATE, MONOBASIC AND POTASSIUM PHOSPHATE, DIBASIC 15 MMOL: 224; 236 INJECTION, SOLUTION, CONCENTRATE INTRAVENOUS at 08:54

## 2022-06-26 RX ADMIN — Medication 10 ML: at 20:57

## 2022-06-27 ENCOUNTER — APPOINTMENT (OUTPATIENT)
Dept: GENERAL RADIOLOGY | Facility: HOSPITAL | Age: 82
End: 2022-06-27

## 2022-06-27 ENCOUNTER — APPOINTMENT (OUTPATIENT)
Dept: CT IMAGING | Facility: HOSPITAL | Age: 82
End: 2022-06-27

## 2022-06-27 PROBLEM — R19.7 DIARRHEA: Status: ACTIVE | Noted: 2022-06-27

## 2022-06-27 PROBLEM — A41.9 SEPSIS: Status: ACTIVE | Noted: 2022-06-27

## 2022-06-27 LAB
ALBUMIN SERPL-MCNC: 3.7 G/DL (ref 3.5–5.2)
ALBUMIN/GLOB SERPL: 1.3 G/DL
ALP SERPL-CCNC: 133 U/L (ref 39–117)
ALT SERPL W P-5'-P-CCNC: 36 U/L (ref 1–41)
ANION GAP SERPL CALCULATED.3IONS-SCNC: 14 MMOL/L (ref 5–15)
ARTERIAL PATENCY WRIST A: POSITIVE
AST SERPL-CCNC: 68 U/L (ref 1–40)
ATMOSPHERIC PRESS: 754 MMHG
BACTERIA UR QL AUTO: ABNORMAL /HPF
BASE EXCESS BLDA CALC-SCNC: 2 MMOL/L (ref 0–2)
BDY SITE: ABNORMAL
BILIRUB SERPL-MCNC: 0.8 MG/DL (ref 0–1.2)
BILIRUB UR QL STRIP: NEGATIVE
BODY TEMPERATURE: 37 C
BUN SERPL-MCNC: 28 MG/DL (ref 8–23)
BUN/CREAT SERPL: 38.4 (ref 7–25)
C DIFF TOX GENS STL QL NAA+PROBE: POSITIVE
CA-I BLD-MCNC: 4.64 MG/DL (ref 4.6–5.4)
CALCIUM SPEC-SCNC: 9 MG/DL (ref 8.6–10.5)
CHLORIDE SERPL-SCNC: 98 MMOL/L (ref 98–107)
CLARITY UR: CLEAR
CO2 SERPL-SCNC: 23 MMOL/L (ref 22–29)
COLOR UR: ABNORMAL
CREAT SERPL-MCNC: 0.73 MG/DL (ref 0.76–1.27)
CRP SERPL-MCNC: 6.83 MG/DL (ref 0–0.5)
D-LACTATE SERPL-SCNC: 1.1 MMOL/L (ref 0.5–2)
DEPRECATED RDW RBC AUTO: 43.9 FL (ref 37–54)
EGFRCR SERPLBLD CKD-EPI 2021: 90.8 ML/MIN/1.73
ERYTHROCYTE [DISTWIDTH] IN BLOOD BY AUTOMATED COUNT: 13.1 % (ref 12.3–15.4)
GLOBULIN UR ELPH-MCNC: 2.9 GM/DL
GLUCOSE BLDC GLUCOMTR-MCNC: 120 MG/DL (ref 70–130)
GLUCOSE BLDC GLUCOMTR-MCNC: 122 MG/DL (ref 70–130)
GLUCOSE BLDC GLUCOMTR-MCNC: 136 MG/DL (ref 70–130)
GLUCOSE SERPL-MCNC: 104 MG/DL (ref 65–99)
GLUCOSE UR STRIP-MCNC: NEGATIVE MG/DL
HCO3 BLDA-SCNC: 24.7 MMOL/L (ref 20–26)
HCT VFR BLD AUTO: 33.3 % (ref 37.5–51)
HGB BLD-MCNC: 10.8 G/DL (ref 13–17.7)
HGB UR QL STRIP.AUTO: ABNORMAL
HYALINE CASTS UR QL AUTO: ABNORMAL /LPF
INHALED O2 CONCENTRATION: 21 %
KETONES UR QL STRIP: NEGATIVE
LEUKOCYTE ESTERASE UR QL STRIP.AUTO: NEGATIVE
Lab: ABNORMAL
Lab: NORMAL
MAGNESIUM SERPL-MCNC: 1.9 MG/DL (ref 1.6–2.4)
MCH RBC QN AUTO: 29.7 PG (ref 26.6–33)
MCHC RBC AUTO-ENTMCNC: 32.4 G/DL (ref 31.5–35.7)
MCV RBC AUTO: 91.5 FL (ref 79–97)
MODALITY: ABNORMAL
NITRITE UR QL STRIP: NEGATIVE
PCO2 BLDA: 30.8 MM HG (ref 35–45)
PCO2 TEMP ADJ BLD: 30.8 MM HG (ref 35–45)
PH BLDA: 7.51 PH UNITS (ref 7.35–7.45)
PH UR STRIP.AUTO: 6 [PH] (ref 5–8)
PH, TEMP CORRECTED: 7.51 PH UNITS (ref 7.35–7.45)
PHOSPHATE SERPL-MCNC: 2.2 MG/DL (ref 2.5–4.5)
PHOSPHATE SERPL-MCNC: 2.8 MG/DL (ref 2.5–4.5)
PLATELET # BLD AUTO: 356 10*3/MM3 (ref 140–450)
PMV BLD AUTO: 10.2 FL (ref 6–12)
PO2 BLDA: 60.3 MM HG (ref 83–108)
PO2 TEMP ADJ BLD: 60.3 MM HG (ref 83–108)
POTASSIUM SERPL-SCNC: 4 MMOL/L (ref 3.5–5.2)
PREALB SERPL-MCNC: 21.9 MG/DL (ref 20–40)
PROT SERPL-MCNC: 6.6 G/DL (ref 6–8.5)
PROT UR QL STRIP: ABNORMAL
RBC # BLD AUTO: 3.64 10*6/MM3 (ref 4.14–5.8)
RBC # UR STRIP: ABNORMAL /HPF
REF LAB TEST METHOD: ABNORMAL
SAO2 % BLDCOA: 93.3 % (ref 94–99)
SARS-COV-2 RNA PNL SPEC NAA+PROBE: NOT DETECTED
SODIUM SERPL-SCNC: 135 MMOL/L (ref 136–145)
SP GR UR STRIP: 1.02 (ref 1–1.03)
SQUAMOUS #/AREA URNS HPF: ABNORMAL /HPF
TRIGL SERPL-MCNC: 242 MG/DL (ref 0–150)
UROBILINOGEN UR QL STRIP: ABNORMAL
VENTILATOR MODE: ABNORMAL
WBC # UR STRIP: ABNORMAL /HPF
WBC NRBC COR # BLD: 23.2 10*3/MM3 (ref 3.4–10.8)

## 2022-06-27 PROCEDURE — 80053 COMPREHEN METABOLIC PANEL: CPT | Performed by: INTERNAL MEDICINE

## 2022-06-27 PROCEDURE — 84478 ASSAY OF TRIGLYCERIDES: CPT | Performed by: INTERNAL MEDICINE

## 2022-06-27 PROCEDURE — 83605 ASSAY OF LACTIC ACID: CPT | Performed by: INTERNAL MEDICINE

## 2022-06-27 PROCEDURE — 83735 ASSAY OF MAGNESIUM: CPT | Performed by: INTERNAL MEDICINE

## 2022-06-27 PROCEDURE — 74178 CT ABD&PLV WO CNTR FLWD CNTR: CPT

## 2022-06-27 PROCEDURE — 97535 SELF CARE MNGMENT TRAINING: CPT | Performed by: OCCUPATIONAL THERAPIST

## 2022-06-27 PROCEDURE — 94761 N-INVAS EAR/PLS OXIMETRY MLT: CPT

## 2022-06-27 PROCEDURE — 86140 C-REACTIVE PROTEIN: CPT | Performed by: INTERNAL MEDICINE

## 2022-06-27 PROCEDURE — 84100 ASSAY OF PHOSPHORUS: CPT | Performed by: INTERNAL MEDICINE

## 2022-06-27 PROCEDURE — 25010000002 CEFTRIAXONE PER 250 MG: Performed by: INTERNAL MEDICINE

## 2022-06-27 PROCEDURE — 99024 POSTOP FOLLOW-UP VISIT: CPT | Performed by: STUDENT IN AN ORGANIZED HEALTH CARE EDUCATION/TRAINING PROGRAM

## 2022-06-27 PROCEDURE — 94799 UNLISTED PULMONARY SVC/PX: CPT

## 2022-06-27 PROCEDURE — 92610 EVALUATE SWALLOWING FUNCTION: CPT | Performed by: SPEECH-LANGUAGE PATHOLOGIST

## 2022-06-27 PROCEDURE — 82330 ASSAY OF CALCIUM: CPT

## 2022-06-27 PROCEDURE — 82803 BLOOD GASES ANY COMBINATION: CPT

## 2022-06-27 PROCEDURE — 81001 URINALYSIS AUTO W/SCOPE: CPT | Performed by: FAMILY MEDICINE

## 2022-06-27 PROCEDURE — 87635 SARS-COV-2 COVID-19 AMP PRB: CPT | Performed by: STUDENT IN AN ORGANIZED HEALTH CARE EDUCATION/TRAINING PROGRAM

## 2022-06-27 PROCEDURE — 87040 BLOOD CULTURE FOR BACTERIA: CPT | Performed by: STUDENT IN AN ORGANIZED HEALTH CARE EDUCATION/TRAINING PROGRAM

## 2022-06-27 PROCEDURE — 85027 COMPLETE CBC AUTOMATED: CPT | Performed by: INTERNAL MEDICINE

## 2022-06-27 PROCEDURE — 94640 AIRWAY INHALATION TREATMENT: CPT

## 2022-06-27 PROCEDURE — 84134 ASSAY OF PREALBUMIN: CPT | Performed by: INTERNAL MEDICINE

## 2022-06-27 PROCEDURE — 36600 WITHDRAWAL OF ARTERIAL BLOOD: CPT

## 2022-06-27 PROCEDURE — 71270 CT THORAX DX C-/C+: CPT

## 2022-06-27 PROCEDURE — 71045 X-RAY EXAM CHEST 1 VIEW: CPT

## 2022-06-27 PROCEDURE — 82962 GLUCOSE BLOOD TEST: CPT

## 2022-06-27 PROCEDURE — 25010000002 PIPERACILLIN SOD-TAZOBACTAM PER 1 G: Performed by: STUDENT IN AN ORGANIZED HEALTH CARE EDUCATION/TRAINING PROGRAM

## 2022-06-27 PROCEDURE — 87493 C DIFF AMPLIFIED PROBE: CPT | Performed by: INTERNAL MEDICINE

## 2022-06-27 PROCEDURE — 25010000002 IOPAMIDOL 61 % SOLUTION: Performed by: INTERNAL MEDICINE

## 2022-06-27 RX ORDER — METRONIDAZOLE 500 MG/100ML
500 INJECTION, SOLUTION INTRAVENOUS EVERY 8 HOURS SCHEDULED
Status: COMPLETED | OUTPATIENT
Start: 2022-06-27 | End: 2022-06-30

## 2022-06-27 RX ADMIN — ENALAPRILAT 0.62 MG: 1.25 INJECTION INTRAVENOUS at 20:36

## 2022-06-27 RX ADMIN — CHLORHEXIDINE GLUCONATE 15 ML: 1.2 RINSE ORAL at 09:06

## 2022-06-27 RX ADMIN — PANTOPRAZOLE SODIUM 40 MG: 40 INJECTION, POWDER, FOR SOLUTION INTRAVENOUS at 09:07

## 2022-06-27 RX ADMIN — TAZOBACTAM SODIUM AND PIPERACILLIN SODIUM 3.38 G: 375; 3 INJECTION, SOLUTION INTRAVENOUS at 08:55

## 2022-06-27 RX ADMIN — ASCORBIC ACID, VITAMIN A PALMITATE, CHOLECALCIFEROL, THIAMINE HYDROCHLORIDE, RIBOFLAVIN-5 PHOSPHATE SODIUM, PYRIDOXINE HYDROCHLORIDE, NIACINAMIDE, DEXPANTHENOL, ALPHA-TOCOPHEROL ACETATE, VITAMIN K1, FOLIC ACID, BIOTIN, CYANOCOBALAMIN: 200; 3300; 200; 6; 3.6; 6; 40; 15; 10; 150; 600; 60; 5 INJECTION, SOLUTION INTRAVENOUS at 17:55

## 2022-06-27 RX ADMIN — ENALAPRILAT 0.62 MG: 1.25 INJECTION INTRAVENOUS at 11:22

## 2022-06-27 RX ADMIN — ENALAPRILAT 0.62 MG: 1.25 INJECTION INTRAVENOUS at 04:37

## 2022-06-27 RX ADMIN — LIDOCAINE 2 PATCH: 50 PATCH CUTANEOUS at 09:06

## 2022-06-27 RX ADMIN — METRONIDAZOLE 500 MG: 500 INJECTION, SOLUTION INTRAVENOUS at 16:06

## 2022-06-27 RX ADMIN — METRONIDAZOLE 500 MG: 500 INJECTION, SOLUTION INTRAVENOUS at 20:36

## 2022-06-27 RX ADMIN — ENALAPRILAT 0.62 MG: 1.25 INJECTION INTRAVENOUS at 16:15

## 2022-06-27 RX ADMIN — POTASSIUM PHOSPHATE, MONOBASIC AND POTASSIUM PHOSPHATE, DIBASIC 15 MMOL: 224; 236 INJECTION, SOLUTION, CONCENTRATE INTRAVENOUS at 08:54

## 2022-06-27 RX ADMIN — IOPAMIDOL 100 ML: 612 INJECTION, SOLUTION INTRAVENOUS at 10:58

## 2022-06-27 RX ADMIN — PANTOPRAZOLE SODIUM 40 MG: 40 INJECTION, POWDER, FOR SOLUTION INTRAVENOUS at 20:36

## 2022-06-27 RX ADMIN — SODIUM CHLORIDE 1 G: 9 INJECTION, SOLUTION INTRAVENOUS at 16:05

## 2022-06-27 RX ADMIN — CHLORHEXIDINE GLUCONATE 15 ML: 1.2 RINSE ORAL at 20:36

## 2022-06-27 RX ADMIN — I.V. FAT EMULSION 50 G: 20 EMULSION INTRAVENOUS at 17:55

## 2022-06-27 RX ADMIN — IPRATROPIUM BROMIDE AND ALBUTEROL SULFATE 3 ML: .5; 3 SOLUTION RESPIRATORY (INHALATION) at 07:46

## 2022-06-28 LAB
ALBUMIN SERPL-MCNC: 3.1 G/DL (ref 3.5–5.2)
ALBUMIN/GLOB SERPL: 1.2 G/DL
ALP SERPL-CCNC: 147 U/L (ref 39–117)
ALT SERPL W P-5'-P-CCNC: 38 U/L (ref 1–41)
ANION GAP SERPL CALCULATED.3IONS-SCNC: 10 MMOL/L (ref 5–15)
AST SERPL-CCNC: 43 U/L (ref 1–40)
BASOPHILS # BLD AUTO: 0.08 10*3/MM3 (ref 0–0.2)
BASOPHILS NFR BLD AUTO: 0.4 % (ref 0–1.5)
BILIRUB SERPL-MCNC: 0.6 MG/DL (ref 0–1.2)
BUN SERPL-MCNC: 38 MG/DL (ref 8–23)
BUN/CREAT SERPL: 41.3 (ref 7–25)
CALCIUM SPEC-SCNC: 8.5 MG/DL (ref 8.6–10.5)
CHLORIDE SERPL-SCNC: 100 MMOL/L (ref 98–107)
CO2 SERPL-SCNC: 23 MMOL/L (ref 22–29)
CREAT SERPL-MCNC: 0.92 MG/DL (ref 0.76–1.27)
DEPRECATED RDW RBC AUTO: 45 FL (ref 37–54)
EGFRCR SERPLBLD CKD-EPI 2021: 83.1 ML/MIN/1.73
EOSINOPHIL # BLD AUTO: 0.34 10*3/MM3 (ref 0–0.4)
EOSINOPHIL NFR BLD AUTO: 1.6 % (ref 0.3–6.2)
ERYTHROCYTE [DISTWIDTH] IN BLOOD BY AUTOMATED COUNT: 13.5 % (ref 12.3–15.4)
GLOBULIN UR ELPH-MCNC: 2.6 GM/DL
GLUCOSE BLDC GLUCOMTR-MCNC: 112 MG/DL (ref 70–130)
GLUCOSE BLDC GLUCOMTR-MCNC: 112 MG/DL (ref 70–130)
GLUCOSE BLDC GLUCOMTR-MCNC: 115 MG/DL (ref 70–130)
GLUCOSE SERPL-MCNC: 133 MG/DL (ref 65–99)
HCT VFR BLD AUTO: 28.6 % (ref 37.5–51)
HGB BLD-MCNC: 9.2 G/DL (ref 13–17.7)
IMM GRANULOCYTES # BLD AUTO: 0.92 10*3/MM3 (ref 0–0.05)
IMM GRANULOCYTES NFR BLD AUTO: 4.2 % (ref 0–0.5)
LYMPHOCYTES # BLD AUTO: 1.64 10*3/MM3 (ref 0.7–3.1)
LYMPHOCYTES NFR BLD AUTO: 7.5 % (ref 19.6–45.3)
MAGNESIUM SERPL-MCNC: 2 MG/DL (ref 1.6–2.4)
MCH RBC QN AUTO: 29.4 PG (ref 26.6–33)
MCHC RBC AUTO-ENTMCNC: 32.2 G/DL (ref 31.5–35.7)
MCV RBC AUTO: 91.4 FL (ref 79–97)
MONOCYTES # BLD AUTO: 1.48 10*3/MM3 (ref 0.1–0.9)
MONOCYTES NFR BLD AUTO: 6.8 % (ref 5–12)
NEUTROPHILS NFR BLD AUTO: 17.32 10*3/MM3 (ref 1.7–7)
NEUTROPHILS NFR BLD AUTO: 79.5 % (ref 42.7–76)
NRBC BLD AUTO-RTO: 0.1 /100 WBC (ref 0–0.2)
PHOSPHATE SERPL-MCNC: 2.7 MG/DL (ref 2.5–4.5)
PLATELET # BLD AUTO: 313 10*3/MM3 (ref 140–450)
PMV BLD AUTO: 10.3 FL (ref 6–12)
POTASSIUM SERPL-SCNC: 3.9 MMOL/L (ref 3.5–5.2)
PROT SERPL-MCNC: 5.7 G/DL (ref 6–8.5)
RBC # BLD AUTO: 3.13 10*6/MM3 (ref 4.14–5.8)
SODIUM SERPL-SCNC: 133 MMOL/L (ref 136–145)
TRIGL SERPL-MCNC: 152 MG/DL (ref 0–150)
WBC NRBC COR # BLD: 21.78 10*3/MM3 (ref 3.4–10.8)

## 2022-06-28 PROCEDURE — 84478 ASSAY OF TRIGLYCERIDES: CPT | Performed by: STUDENT IN AN ORGANIZED HEALTH CARE EDUCATION/TRAINING PROGRAM

## 2022-06-28 PROCEDURE — 92526 ORAL FUNCTION THERAPY: CPT | Performed by: SPEECH-LANGUAGE PATHOLOGIST

## 2022-06-28 PROCEDURE — 97530 THERAPEUTIC ACTIVITIES: CPT

## 2022-06-28 PROCEDURE — 82962 GLUCOSE BLOOD TEST: CPT

## 2022-06-28 PROCEDURE — 85025 COMPLETE CBC W/AUTO DIFF WBC: CPT | Performed by: INTERNAL MEDICINE

## 2022-06-28 PROCEDURE — 97535 SELF CARE MNGMENT TRAINING: CPT

## 2022-06-28 PROCEDURE — 83735 ASSAY OF MAGNESIUM: CPT | Performed by: INTERNAL MEDICINE

## 2022-06-28 PROCEDURE — 99024 POSTOP FOLLOW-UP VISIT: CPT | Performed by: STUDENT IN AN ORGANIZED HEALTH CARE EDUCATION/TRAINING PROGRAM

## 2022-06-28 PROCEDURE — 80053 COMPREHEN METABOLIC PANEL: CPT | Performed by: INTERNAL MEDICINE

## 2022-06-28 PROCEDURE — 84100 ASSAY OF PHOSPHORUS: CPT | Performed by: INTERNAL MEDICINE

## 2022-06-28 RX ADMIN — METRONIDAZOLE 500 MG: 500 INJECTION, SOLUTION INTRAVENOUS at 06:20

## 2022-06-28 RX ADMIN — METRONIDAZOLE 500 MG: 500 INJECTION, SOLUTION INTRAVENOUS at 15:13

## 2022-06-28 RX ADMIN — LIDOCAINE 2 PATCH: 50 PATCH CUTANEOUS at 08:17

## 2022-06-28 RX ADMIN — ENALAPRILAT 0.62 MG: 1.25 INJECTION INTRAVENOUS at 06:19

## 2022-06-28 RX ADMIN — METRONIDAZOLE 500 MG: 500 INJECTION, SOLUTION INTRAVENOUS at 21:53

## 2022-06-28 RX ADMIN — PANTOPRAZOLE SODIUM 40 MG: 40 INJECTION, POWDER, FOR SOLUTION INTRAVENOUS at 21:52

## 2022-06-28 RX ADMIN — CHLORHEXIDINE GLUCONATE 15 ML: 1.2 RINSE ORAL at 08:16

## 2022-06-28 RX ADMIN — ENALAPRILAT 0.62 MG: 1.25 INJECTION INTRAVENOUS at 22:03

## 2022-06-28 RX ADMIN — I.V. FAT EMULSION 50 G: 20 EMULSION INTRAVENOUS at 18:38

## 2022-06-28 RX ADMIN — ENALAPRILAT 0.62 MG: 1.25 INJECTION INTRAVENOUS at 16:04

## 2022-06-28 RX ADMIN — LEUCINE, PHENYLALANINE, LYSINE, METHIONINE, ISOLEUCINE, VALINE, HISTIDINE, THREONINE, TRYPTOPHAN, ALANINE, GLYCINE, ARGININE, PROLINE, SERINE, TYROSINE, SODIUM ACETATE, DIBASIC POTASSIUM PHOSPHATE, MAGNESIUM CHLORIDE, SODIUM CHLORIDE, CALCIUM CHLORIDE, DEXTROSE
365; 280; 290; 200; 300; 290; 240; 210; 90; 1035; 515; 575; 340; 250; 20; 340; 261; 51; 59; 33; 15 INJECTION INTRAVENOUS at 18:38

## 2022-06-28 RX ADMIN — PANTOPRAZOLE SODIUM 40 MG: 40 INJECTION, POWDER, FOR SOLUTION INTRAVENOUS at 08:16

## 2022-06-28 RX ADMIN — CHLORHEXIDINE GLUCONATE 15 ML: 1.2 RINSE ORAL at 21:52

## 2022-06-28 RX ADMIN — ENALAPRILAT 0.62 MG: 1.25 INJECTION INTRAVENOUS at 10:46

## 2022-06-29 LAB
ALBUMIN SERPL-MCNC: 2.8 G/DL (ref 3.5–5.2)
ALBUMIN/GLOB SERPL: 1.2 G/DL
ALP SERPL-CCNC: 125 U/L (ref 39–117)
ALT SERPL W P-5'-P-CCNC: 28 U/L (ref 1–41)
ANION GAP SERPL CALCULATED.3IONS-SCNC: 8 MMOL/L (ref 5–15)
AST SERPL-CCNC: 27 U/L (ref 1–40)
BASOPHILS # BLD AUTO: 0.06 10*3/MM3 (ref 0–0.2)
BASOPHILS NFR BLD AUTO: 0.4 % (ref 0–1.5)
BILIRUB SERPL-MCNC: 0.3 MG/DL (ref 0–1.2)
BUN SERPL-MCNC: 39 MG/DL (ref 8–23)
BUN/CREAT SERPL: 50.6 (ref 7–25)
CALCIUM SPEC-SCNC: 8.2 MG/DL (ref 8.6–10.5)
CHLORIDE SERPL-SCNC: 100 MMOL/L (ref 98–107)
CO2 SERPL-SCNC: 25 MMOL/L (ref 22–29)
CREAT SERPL-MCNC: 0.77 MG/DL (ref 0.76–1.27)
DEPRECATED RDW RBC AUTO: 45.1 FL (ref 37–54)
EGFRCR SERPLBLD CKD-EPI 2021: 89.4 ML/MIN/1.73
EOSINOPHIL # BLD AUTO: 0.82 10*3/MM3 (ref 0–0.4)
EOSINOPHIL NFR BLD AUTO: 5.5 % (ref 0.3–6.2)
ERYTHROCYTE [DISTWIDTH] IN BLOOD BY AUTOMATED COUNT: 13.4 % (ref 12.3–15.4)
GLOBULIN UR ELPH-MCNC: 2.4 GM/DL
GLUCOSE BLDC GLUCOMTR-MCNC: 123 MG/DL (ref 70–130)
GLUCOSE BLDC GLUCOMTR-MCNC: 139 MG/DL (ref 70–130)
GLUCOSE SERPL-MCNC: 114 MG/DL (ref 65–99)
HCT VFR BLD AUTO: 26.2 % (ref 37.5–51)
HGB BLD-MCNC: 8.3 G/DL (ref 13–17.7)
IMM GRANULOCYTES # BLD AUTO: 0.43 10*3/MM3 (ref 0–0.05)
IMM GRANULOCYTES NFR BLD AUTO: 2.9 % (ref 0–0.5)
LYMPHOCYTES # BLD AUTO: 1.78 10*3/MM3 (ref 0.7–3.1)
LYMPHOCYTES NFR BLD AUTO: 12 % (ref 19.6–45.3)
MAGNESIUM SERPL-MCNC: 1.9 MG/DL (ref 1.6–2.4)
MCH RBC QN AUTO: 28.9 PG (ref 26.6–33)
MCHC RBC AUTO-ENTMCNC: 31.7 G/DL (ref 31.5–35.7)
MCV RBC AUTO: 91.3 FL (ref 79–97)
MONOCYTES # BLD AUTO: 0.91 10*3/MM3 (ref 0.1–0.9)
MONOCYTES NFR BLD AUTO: 6.1 % (ref 5–12)
NEUTROPHILS NFR BLD AUTO: 10.88 10*3/MM3 (ref 1.7–7)
NEUTROPHILS NFR BLD AUTO: 73.1 % (ref 42.7–76)
NRBC BLD AUTO-RTO: 0 /100 WBC (ref 0–0.2)
PLATELET # BLD AUTO: 279 10*3/MM3 (ref 140–450)
PMV BLD AUTO: 10.1 FL (ref 6–12)
POTASSIUM SERPL-SCNC: 3.5 MMOL/L (ref 3.5–5.2)
PROT SERPL-MCNC: 5.2 G/DL (ref 6–8.5)
RBC # BLD AUTO: 2.87 10*6/MM3 (ref 4.14–5.8)
SODIUM SERPL-SCNC: 133 MMOL/L (ref 136–145)
WBC NRBC COR # BLD: 14.88 10*3/MM3 (ref 3.4–10.8)

## 2022-06-29 PROCEDURE — 99024 POSTOP FOLLOW-UP VISIT: CPT | Performed by: STUDENT IN AN ORGANIZED HEALTH CARE EDUCATION/TRAINING PROGRAM

## 2022-06-29 PROCEDURE — 82962 GLUCOSE BLOOD TEST: CPT

## 2022-06-29 PROCEDURE — 85025 COMPLETE CBC W/AUTO DIFF WBC: CPT | Performed by: INTERNAL MEDICINE

## 2022-06-29 PROCEDURE — 97116 GAIT TRAINING THERAPY: CPT

## 2022-06-29 PROCEDURE — 80053 COMPREHEN METABOLIC PANEL: CPT | Performed by: INTERNAL MEDICINE

## 2022-06-29 PROCEDURE — 83735 ASSAY OF MAGNESIUM: CPT | Performed by: INTERNAL MEDICINE

## 2022-06-29 PROCEDURE — 92526 ORAL FUNCTION THERAPY: CPT | Performed by: SPEECH-LANGUAGE PATHOLOGIST

## 2022-06-29 PROCEDURE — 97110 THERAPEUTIC EXERCISES: CPT

## 2022-06-29 RX ORDER — OXYCODONE HCL 5 MG/5 ML
5 SOLUTION, ORAL ORAL EVERY 6 HOURS PRN
Status: DISCONTINUED | OUTPATIENT
Start: 2022-06-29 | End: 2022-06-30

## 2022-06-29 RX ADMIN — METRONIDAZOLE 500 MG: 500 INJECTION, SOLUTION INTRAVENOUS at 14:41

## 2022-06-29 RX ADMIN — METRONIDAZOLE 500 MG: 500 INJECTION, SOLUTION INTRAVENOUS at 23:05

## 2022-06-29 RX ADMIN — CHLORHEXIDINE GLUCONATE 15 ML: 1.2 RINSE ORAL at 09:49

## 2022-06-29 RX ADMIN — PANTOPRAZOLE SODIUM 40 MG: 40 INJECTION, POWDER, FOR SOLUTION INTRAVENOUS at 20:32

## 2022-06-29 RX ADMIN — PANTOPRAZOLE SODIUM 40 MG: 40 INJECTION, POWDER, FOR SOLUTION INTRAVENOUS at 09:49

## 2022-06-29 RX ADMIN — CHLORHEXIDINE GLUCONATE 15 ML: 1.2 RINSE ORAL at 20:25

## 2022-06-29 RX ADMIN — ASCORBIC ACID, VITAMIN A PALMITATE, CHOLECALCIFEROL, THIAMINE HYDROCHLORIDE, RIBOFLAVIN-5 PHOSPHATE SODIUM, PYRIDOXINE HYDROCHLORIDE, NIACINAMIDE, DEXPANTHENOL, ALPHA-TOCOPHEROL ACETATE, VITAMIN K1, FOLIC ACID, BIOTIN, CYANOCOBALAMIN: 200; 3300; 200; 6; 3.6; 6; 40; 15; 10; 150; 600; 60; 5 INJECTION, SOLUTION INTRAVENOUS at 20:13

## 2022-06-29 RX ADMIN — METRONIDAZOLE 500 MG: 500 INJECTION, SOLUTION INTRAVENOUS at 05:41

## 2022-06-29 RX ADMIN — I.V. FAT EMULSION 50 G: 20 EMULSION INTRAVENOUS at 20:12

## 2022-06-29 RX ADMIN — ENALAPRILAT 0.62 MG: 1.25 INJECTION INTRAVENOUS at 09:49

## 2022-06-29 RX ADMIN — ENALAPRILAT 0.62 MG: 1.25 INJECTION INTRAVENOUS at 04:43

## 2022-06-30 LAB
ANION GAP SERPL CALCULATED.3IONS-SCNC: 7 MMOL/L (ref 5–15)
BASOPHILS # BLD AUTO: 0.04 10*3/MM3 (ref 0–0.2)
BASOPHILS NFR BLD AUTO: 0.3 % (ref 0–1.5)
BUN SERPL-MCNC: 31 MG/DL (ref 8–23)
BUN/CREAT SERPL: 39.2 (ref 7–25)
CALCIUM SPEC-SCNC: 8.4 MG/DL (ref 8.6–10.5)
CHLORIDE SERPL-SCNC: 101 MMOL/L (ref 98–107)
CO2 SERPL-SCNC: 27 MMOL/L (ref 22–29)
CREAT SERPL-MCNC: 0.79 MG/DL (ref 0.76–1.27)
DEPRECATED RDW RBC AUTO: 45.8 FL (ref 37–54)
EGFRCR SERPLBLD CKD-EPI 2021: 88.7 ML/MIN/1.73
EOSINOPHIL # BLD AUTO: 0.71 10*3/MM3 (ref 0–0.4)
EOSINOPHIL NFR BLD AUTO: 5.9 % (ref 0.3–6.2)
ERYTHROCYTE [DISTWIDTH] IN BLOOD BY AUTOMATED COUNT: 13.5 % (ref 12.3–15.4)
GLUCOSE BLDC GLUCOMTR-MCNC: 107 MG/DL (ref 70–130)
GLUCOSE BLDC GLUCOMTR-MCNC: 119 MG/DL (ref 70–130)
GLUCOSE BLDC GLUCOMTR-MCNC: 87 MG/DL (ref 70–130)
GLUCOSE SERPL-MCNC: 112 MG/DL (ref 65–99)
HCT VFR BLD AUTO: 25.7 % (ref 37.5–51)
HGB BLD-MCNC: 8.1 G/DL (ref 13–17.7)
IMM GRANULOCYTES # BLD AUTO: 0.47 10*3/MM3 (ref 0–0.05)
IMM GRANULOCYTES NFR BLD AUTO: 3.9 % (ref 0–0.5)
LYMPHOCYTES # BLD AUTO: 2.05 10*3/MM3 (ref 0.7–3.1)
LYMPHOCYTES NFR BLD AUTO: 17.1 % (ref 19.6–45.3)
MAGNESIUM SERPL-MCNC: 1.9 MG/DL (ref 1.6–2.4)
MCH RBC QN AUTO: 29 PG (ref 26.6–33)
MCHC RBC AUTO-ENTMCNC: 31.5 G/DL (ref 31.5–35.7)
MCV RBC AUTO: 92.1 FL (ref 79–97)
MONOCYTES # BLD AUTO: 0.83 10*3/MM3 (ref 0.1–0.9)
MONOCYTES NFR BLD AUTO: 6.9 % (ref 5–12)
NEUTROPHILS NFR BLD AUTO: 65.9 % (ref 42.7–76)
NEUTROPHILS NFR BLD AUTO: 7.87 10*3/MM3 (ref 1.7–7)
NRBC BLD AUTO-RTO: 0 /100 WBC (ref 0–0.2)
PHOSPHATE SERPL-MCNC: 2.2 MG/DL (ref 2.5–4.5)
PLATELET # BLD AUTO: 301 10*3/MM3 (ref 140–450)
PMV BLD AUTO: 10.5 FL (ref 6–12)
POTASSIUM SERPL-SCNC: 3.5 MMOL/L (ref 3.5–5.2)
RBC # BLD AUTO: 2.79 10*6/MM3 (ref 4.14–5.8)
SODIUM SERPL-SCNC: 135 MMOL/L (ref 136–145)
WBC NRBC COR # BLD: 11.97 10*3/MM3 (ref 3.4–10.8)

## 2022-06-30 PROCEDURE — 85025 COMPLETE CBC W/AUTO DIFF WBC: CPT | Performed by: INTERNAL MEDICINE

## 2022-06-30 PROCEDURE — 80048 BASIC METABOLIC PNL TOTAL CA: CPT | Performed by: INTERNAL MEDICINE

## 2022-06-30 PROCEDURE — 83735 ASSAY OF MAGNESIUM: CPT | Performed by: INTERNAL MEDICINE

## 2022-06-30 PROCEDURE — 97116 GAIT TRAINING THERAPY: CPT

## 2022-06-30 PROCEDURE — 99024 POSTOP FOLLOW-UP VISIT: CPT | Performed by: STUDENT IN AN ORGANIZED HEALTH CARE EDUCATION/TRAINING PROGRAM

## 2022-06-30 PROCEDURE — 97110 THERAPEUTIC EXERCISES: CPT

## 2022-06-30 PROCEDURE — 84100 ASSAY OF PHOSPHORUS: CPT | Performed by: INTERNAL MEDICINE

## 2022-06-30 PROCEDURE — 92526 ORAL FUNCTION THERAPY: CPT | Performed by: SPEECH-LANGUAGE PATHOLOGIST

## 2022-06-30 PROCEDURE — 82962 GLUCOSE BLOOD TEST: CPT

## 2022-06-30 RX ORDER — LISINOPRIL 10 MG/1
10 TABLET ORAL
Status: DISCONTINUED | OUTPATIENT
Start: 2022-07-01 | End: 2022-07-06 | Stop reason: HOSPADM

## 2022-06-30 RX ORDER — VANCOMYCIN HYDROCHLORIDE 125 MG/1
125 CAPSULE ORAL EVERY 6 HOURS SCHEDULED
Status: DISCONTINUED | OUTPATIENT
Start: 2022-07-01 | End: 2022-07-06 | Stop reason: HOSPADM

## 2022-06-30 RX ADMIN — METRONIDAZOLE 500 MG: 500 INJECTION, SOLUTION INTRAVENOUS at 14:53

## 2022-06-30 RX ADMIN — PANTOPRAZOLE SODIUM 40 MG: 40 INJECTION, POWDER, FOR SOLUTION INTRAVENOUS at 10:24

## 2022-06-30 RX ADMIN — ENALAPRILAT 0.62 MG: 1.25 INJECTION INTRAVENOUS at 04:52

## 2022-06-30 RX ADMIN — CHLORHEXIDINE GLUCONATE 15 ML: 1.2 RINSE ORAL at 10:25

## 2022-06-30 RX ADMIN — ENALAPRILAT 0.62 MG: 1.25 INJECTION INTRAVENOUS at 10:24

## 2022-06-30 RX ADMIN — CHLORHEXIDINE GLUCONATE 15 ML: 1.2 RINSE ORAL at 23:28

## 2022-06-30 RX ADMIN — METRONIDAZOLE 500 MG: 500 INJECTION, SOLUTION INTRAVENOUS at 06:05

## 2022-06-30 RX ADMIN — METRONIDAZOLE 500 MG: 500 INJECTION, SOLUTION INTRAVENOUS at 23:28

## 2022-06-30 RX ADMIN — PANTOPRAZOLE SODIUM 40 MG: 40 INJECTION, POWDER, FOR SOLUTION INTRAVENOUS at 23:28

## 2022-06-30 RX ADMIN — LIDOCAINE 2 PATCH: 50 PATCH CUTANEOUS at 10:24

## 2022-07-01 LAB
ANION GAP SERPL CALCULATED.3IONS-SCNC: 10 MMOL/L (ref 5–15)
BUN SERPL-MCNC: 23 MG/DL (ref 8–23)
BUN/CREAT SERPL: 33.8 (ref 7–25)
CALCIUM SPEC-SCNC: 8.6 MG/DL (ref 8.6–10.5)
CHLORIDE SERPL-SCNC: 103 MMOL/L (ref 98–107)
CO2 SERPL-SCNC: 23 MMOL/L (ref 22–29)
CREAT SERPL-MCNC: 0.68 MG/DL (ref 0.76–1.27)
EGFRCR SERPLBLD CKD-EPI 2021: 92.8 ML/MIN/1.73
GLUCOSE SERPL-MCNC: 92 MG/DL (ref 65–99)
MAGNESIUM SERPL-MCNC: 1.9 MG/DL (ref 1.6–2.4)
PHOSPHATE SERPL-MCNC: 2.6 MG/DL (ref 2.5–4.5)
POTASSIUM SERPL-SCNC: 3.9 MMOL/L (ref 3.5–5.2)
SODIUM SERPL-SCNC: 136 MMOL/L (ref 136–145)

## 2022-07-01 PROCEDURE — 99024 POSTOP FOLLOW-UP VISIT: CPT | Performed by: STUDENT IN AN ORGANIZED HEALTH CARE EDUCATION/TRAINING PROGRAM

## 2022-07-01 PROCEDURE — 83735 ASSAY OF MAGNESIUM: CPT | Performed by: INTERNAL MEDICINE

## 2022-07-01 PROCEDURE — 97110 THERAPEUTIC EXERCISES: CPT | Performed by: OCCUPATIONAL THERAPIST

## 2022-07-01 PROCEDURE — 92526 ORAL FUNCTION THERAPY: CPT | Performed by: SPEECH-LANGUAGE PATHOLOGIST

## 2022-07-01 PROCEDURE — 80048 BASIC METABOLIC PNL TOTAL CA: CPT | Performed by: INTERNAL MEDICINE

## 2022-07-01 PROCEDURE — 84100 ASSAY OF PHOSPHORUS: CPT | Performed by: INTERNAL MEDICINE

## 2022-07-01 PROCEDURE — 97530 THERAPEUTIC ACTIVITIES: CPT | Performed by: OCCUPATIONAL THERAPIST

## 2022-07-01 PROCEDURE — 97116 GAIT TRAINING THERAPY: CPT

## 2022-07-01 PROCEDURE — 97110 THERAPEUTIC EXERCISES: CPT

## 2022-07-01 RX ADMIN — VANCOMYCIN HYDROCHLORIDE 125 MG: 125 CAPSULE ORAL at 18:57

## 2022-07-01 RX ADMIN — PANTOPRAZOLE SODIUM 40 MG: 40 INJECTION, POWDER, FOR SOLUTION INTRAVENOUS at 10:30

## 2022-07-01 RX ADMIN — VANCOMYCIN HYDROCHLORIDE 125 MG: 125 CAPSULE ORAL at 05:14

## 2022-07-01 RX ADMIN — CHLORHEXIDINE GLUCONATE 15 ML: 1.2 RINSE ORAL at 11:30

## 2022-07-01 RX ADMIN — PANTOPRAZOLE SODIUM 40 MG: 40 INJECTION, POWDER, FOR SOLUTION INTRAVENOUS at 22:01

## 2022-07-01 RX ADMIN — LISINOPRIL 10 MG: 10 TABLET ORAL at 11:30

## 2022-07-01 RX ADMIN — VANCOMYCIN HYDROCHLORIDE 125 MG: 125 CAPSULE ORAL at 10:30

## 2022-07-01 RX ADMIN — Medication 10 ML: at 22:01

## 2022-07-01 NOTE — PAYOR COMM NOTE
"Echo Will (82 y.o. Male) CASE [id number]   CONT STAY  PLEASE REVIEW FOR ADDITIONAL DAYS  [facility] phone    Fax                 Date of Birth   1940    Social Security Number       Address   [address] [place], [place] [address]    Home Phone   [phone]    MRN   [id number]       Restorationist   Religious    Marital Status                               Admission Date   6/19/22    Admission Type   Emergency    Admitting Provider   Yoan Mckeon DO    Attending Provider   Yoan Mckeon DO    Department, Room/Bed   [facility] 3C, 376/1       Discharge Date       Discharge Disposition       Discharge Destination                               Attending Provider: Yoan Mckeon DO    Allergies: Amoxicillin, Mobic [Meloxicam]    Isolation: Spore   Infection: C.difficile (06/27/22)   Code Status: CPR   Advance Care Planning Activity    Ht: 180.3 cm (71\")   Wt: 87.6 kg (193 lb 3.2 oz)    Admission Cmt: None   Principal Problem: Bleeding duodenal ulcer [K26.4]                 Active Insurance as of 6/19/2022     Primary Coverage     Payor Plan Insurance Group Employer/Plan Group    ANTHEM MEDICARE REPLACEMENT ANTHEM MEDICARE ADVANTAGE [id number]     Payor Plan Address Payor Plan Phone Number Payor Plan Fax Number Effective Dates    [address] [phone]  1/1/2015 - None Entered    [facility] [phone]       Subscriber Name Subscriber Birth Date Member ID       ECHO WILL 1940 [id number]                 Emergency Contacts      (Rel.) Home Phone Work Phone Mobile Phone    Madelyn Will (Spouse) [phone] -- [phone]    Kylie Reveles (Daughter) [phone] -- --    Tayler Hummel (Other) [phone] -- --              Current Facility-Administered Medications   Medication Dose Route Frequency Provider Last Rate Last Admin   • chlorhexidine (PERIDEX) 0.12 % solution 15 mL  15 mL Mouth/Throat Q12H " Morales Ireland MD   15 mL at 07/01/22 1130   • hydrALAZINE (APRESOLINE) injection 10 mg  10 mg Intravenous Q4H PRN Morales Ireland MD   10 mg at 06/24/22 0429   • HYDROmorphone (DILAUDID) injection 0.5 mg  0.5 mg Intravenous Q2H PRN Nancy Park MD   0.5 mg at 06/23/22 2327   • ipratropium-albuterol (DUO-NEB) nebulizer solution 3 mL  3 mL Nebulization Q4H PRN Morales Ireland MD   3 mL at 06/27/22 0746   • labetalol (NORMODYNE,TRANDATE) injection 10 mg  10 mg Intravenous Q4H PRN Morales Ireland MD       • lidocaine (LIDODERM) 5 % 2 patch  2 patch Transdermal Q24H Morales Ireland MD   2 patch at 06/30/22 1024   • lisinopril (PRINIVIL,ZESTRIL) tablet 10 mg  10 mg Oral Q24H Yoan Mckeon DO   10 mg at 07/01/22 1130   • ondansetron (ZOFRAN) injection 4 mg  4 mg Intravenous Q6H PRN Morales Ireland MD       • pantoprazole (PROTONIX) injection 40 mg  40 mg Intravenous Q12H Morales Ireland MD   40 mg at 07/01/22 1030   • sodium chloride 0.9 % flush 10 mL  10 mL Intravenous PRN Morales Ireland MD       • sodium chloride 0.9 % flush 10 mL  10 mL Intravenous PRN Morales Ireland MD   10 mL at 06/26/22 2057   • vancomycin (VANCOCIN) capsule 125 mg  125 mg Oral Q6H Yoan Mckeon DO   125 mg at 07/01/22 1030        Physician Progress Notes (last 24 hours)      Nancy Park MD at 07/01/22 0753          Nancy Park MD - General Surgery  Progress Note     LOS: 11 days   Patient Care Team:  Tenzin Sam MD as PCP - General (Family Medicine)      Subjective     Interval History:     Tolerating full liquids. Denies nausea and vomiting. Staples removed. Working with therapy.     Objective     Vital Signs  Temp:  [97.7 °F (36.5 °C)-98.2 °F (36.8 °C)] 98.2 °F (36.8 °C)  Heart Rate:  [75-88] 77  Resp:  [18] 18  BP: (127-139)/(69-88) 127/69    Physical Exam:  General appearance - alert, well appearing, and in  no distress  Mental status - alert and more oriented   Heart - normal rate   Abdomen - soft, non-tender, non-distended, staples removed and wound intact. Drain removed.     Results Review:    Lab Results (last 24 hours)     Procedure Component Value Units Date/Time    Basic Metabolic Panel [[id number]]  (Abnormal) Collected: 07/01/22 0514    Specimen: Blood Updated: 07/01/22 0545     Glucose 92 mg/dL      BUN 23 mg/dL      Creatinine 0.68 mg/dL      Sodium 136 mmol/L      Potassium 3.9 mmol/L      Chloride 103 mmol/L      CO2 23.0 mmol/L      Calcium 8.6 mg/dL      BUN/Creatinine Ratio 33.8     Anion Gap 10.0 mmol/L      eGFR 92.8 mL/min/1.73      Comment: National Kidney Foundation and American Society of Nephrology (ASN) Task Force recommended calculation based on the Chronic Kidney Disease Epidemiology Collaboration (CKD-EPI) equation refit without adjustment for race.       Narrative:      GFR Normal >60  Chronic Kidney Disease <60  Kidney Failure <15      Phosphorus [[id number]]  (Normal) Collected: 07/01/22 0514    Specimen: Blood Updated: 07/01/22 0545     Phosphorus 2.6 mg/dL     Magnesium [[id number]]  (Normal) Collected: 07/01/22 0514    Specimen: Blood Updated: 07/01/22 0545     Magnesium 1.9 mg/dL     POC Glucose Once [[id number]]  (Normal) Collected: 06/30/22 2152    Specimen: Blood Updated: 06/30/22 2203     Glucose 87 mg/dL      Comment: : [id number] Meghna CaryMeter ID: [id number]       POC Glucose Once [[id number]]  (Normal) Collected: 06/30/22 1216    Specimen: Blood Updated: 06/30/22 1227     Glucose 119 mg/dL      Comment: : [id number] Brant SchulteothyMeter ID: [id number]       Blood Culture With DENNISE - Blood, Blood, Central Line [[id number]]  (Normal) Collected: 06/27/22 1011    Specimen: Blood, Central Line Updated: 06/30/22 1048     Blood Culture No growth at 3 days    Blood Culture With DENNISE - Blood, Hand, Left [[id number]]  (Normal) Collected: 06/27/22 1001    Specimen: Blood from Hand, Left  Updated: 06/30/22 1032     Blood Culture No growth at 3 days        Imaging Results (Last 24 Hours)     ** No results found for the last 24 hours. **            Assessment & Plan       Mr. Temple is an 81 year old male who presented with hypotension and anemia and was found on endoscopy to have a bleeding duodenal ulcer that was not able to be controlled endoscopically. He underwent an exploratory laparotomy with duodenotomy and gastrotomy with oversewing of bleeding duodenal ulcer and closure of the above.      - CT with PO contrast with no leak from repair site.   - full liquids with TID boost. Okay to DC TPN if tolerates TID boost  - Continue BID PPI   - Patient can be advanced to GI soft diet two weeks post op which will be Monday 7/4/22.     Nancy Park MD  07/01/22  07:54 CDT          Electronically signed by Nancy Park MD at 07/01/22 0863

## 2022-07-01 NOTE — PLAN OF CARE
Goal Outcome Evaluation:  Plan of Care Reviewed With: patient        Progress: improving  Outcome Evaluation: Pt was able to perform bed mobility with supervision.  Transfered sit to stand with CGA.  Amb 20' X 4 with one sitting rest, with RWX and CGA.  Will continue to work with pt to increase strength and to progress pt to be independent with all mobility.

## 2022-07-01 NOTE — PLAN OF CARE
Goal Outcome Evaluation:  Plan of Care Reviewed With: patient, caregiver        Progress: improving       Diet toleration completed. The patient was alert and cooperative. Patient had breakfast tray present but had not eaten anything from it. SLP educated on importance of completing boost for nutritional intake post surgery. Lungs are documented as diminished. He completed trials of thin liquids with no overt s/s when presented from edge of cup. Patient is OK to continue with thin liquids. MD note stated OK for GI soft upgrade on 7/4/22. From an oral swallowing standpoint patient should be able to tolerate this with no difficulty. SLP signing off at this time. If any acute changes or concerns, please re-consult.

## 2022-07-01 NOTE — THERAPY DISCHARGE NOTE
Acute Care - Speech Language Pathology   Swallow Treatment Note/Discharge [facility]     Patient Name: Echo Temple  : 1940  MRN: [id number]  Today's Date: 2022               Admit Date: 2022  Diet toleration completed. The patient was alert and cooperative. Patient had breakfast tray present but had not eaten anything from it. SLP educated on importance of completing boost for nutritional intake post surgery. Lungs are documented as diminished. He completed trials of thin liquids with no overt s/s when presented from edge of cup. Patient is OK to continue with thin liquids. MD note stated OK for GI soft upgrade on 22. From an oral swallowing standpoint patient should be able to tolerate this with no difficulty. SLP signing off at this time. If any acute changes or concerns, please re-consult.     Mahi Ascencio MS CCC-SLP 2022 09:42 CDT    Visit Dx:    ICD-10-CM ICD-9-CM   1. Hypotension, unspecified hypotension type  I95.9 458.9   2. Impaired mobility  Z74.09 799.89   3. Decreased activities of daily living (ADL)  Z78.9 V49.89   4. Dysphagia, unspecified type  R13.10 787.20     Patient Active Problem List   Diagnosis   • Hypotension   • Bleeding duodenal ulcer   • Acute blood loss anemia   • Sepsis (HCC)   • Diarrhea     Past Medical History:   Diagnosis Date   • Cancer (HCC)    • Hypertension      Past Surgical History:   Procedure Laterality Date   • ENDOSCOPY N/A 2022    Procedure: ESOPHAGOGASTRODUODENOSCOPY WITH ANESTHESIA;  Surgeon: Rene Briec MD;  Location: [facility] ENDOSCOPY;  Service: Gastroenterology;  Laterality: N/A;  pre anemia   post duodenal ulcer   Dr. Sam   • EXPLORATORY LAPAROTOMY N/A 2022    Procedure: LAPAROTOMY EXPLORATORY;  Surgeon: Nancy Park MD;  Location: [facility] OR;  Service: General;  Laterality: N/A;   • HEMORRHOIDECTOMY         SLP Recommendation and Plan     SLP Diet Recommendation: full liquid diet (22 1001)               Anticipated Discharge Disposition (SLP): unknown (07/01/22 0941)              Daily Summary of Progress (SLP): progress toward functional goals as expected (07/01/22 0916)     Anticipated Discharge Disposition (SLP): unknown (07/01/22 0941)        Reason for Discharge: all goals and outcomes met, no further needs identified (07/01/22 0941)     Treatment Assessment (SLP): See note (07/01/22 0916)  Plan for Continued Treatment (SLP): treatment no longer indicated as all goals met (07/01/22 0916)    Plan of Care Reviewed With: patient, caregiver (07/01/22 0938)  Progress: improving (07/01/22 0938)    SWALLOW EVALUATION (last 72 hours)     SLP Adult Swallow Evaluation     Row Name 07/01/22 0916 06/30/22 1001 06/29/22 1455 06/28/22 1239          Rehab Evaluation    Document Type therapy note (daily note)  -MM therapy note (daily note)  -MM therapy note (daily note)  -BN therapy note (daily note)  -BN     Subjective Information no complaints  -MM no complaints  -MM complains of;weakness  -BN complains of;pain  -BN     Patient Observations alert;cooperative;agree to therapy  -MM alert;cooperative;agree to therapy  -MM alert;cooperative  -BN alert;cooperative  -BN     Patient/Family/Caregiver Comments/Observations No family present.  -MM No family present.  -MM no family present  -BN no family present  -BN     Patient Effort good  -MM good  -MM good  -BN good  -BN     Symptoms Noted During/After Treatment none  -MM none  -MM -- --            Pain    Additional Documentation Pain Scale: FACES Pre/Post-Treatment (Group)  -MM Pain Scale: FACES Pre/Post-Treatment (Group)  -MM -- Pain Scale: FACES Pre/Post-Treatment (Group)  -BN            Pain Scale: Numbers Pre/Post-Treatment    Pretreatment Pain Rating -- -- 0/10 - no pain  -BN --     Posttreatment Pain Rating -- -- 0/10 - no pain  -BN --     Pain Intervention(s) -- -- -- Repositioned  -BN            Pain Scale: FACES Pre/Post-Treatment    Pain: FACES Scale, Pretreatment  0-->no hurt  -MM 0-->no hurt  -MM -- 2-->hurts little bit  -BN     Posttreatment Pain Rating 0-->no hurt  -MM 0-->no hurt  -MM -- 0-->no hurt  -BN            SLP Treatment Clinical Impressions    Treatment Assessment (SLP) See note  -MM See  note  -MM -- --     Daily Summary of Progress (SLP) progress toward functional goals as expected  -MM progress toward functional goals as expected  -MM progress toward functional goals is good  -BN progress toward functional goals is good  -BN     Plan for Continued Treatment (SLP) treatment no longer indicated as all goals met  -MM continue treatment per plan of care  -MM continue treatment per plan of care  -BN continue treatment per plan of care  -BN     Care Plan Review care plan/treatment goals reviewed  -MM care plan/treatment goals reviewed  -MM care plan/treatment goals reviewed  -BN care plan/treatment goals reviewed  -BN     Care Plan Review, Other Participant(s) caregiver  -MM caregiver  RN Eris  -MM caregiver  -BN caregiver  -BN            Recommendations    SLP Diet Recommendation -- full liquid diet  -MM full liquid diet;nectar thick liquids  -BN --            Swallow Goals (SLP)    Swallow LTGs Patient will demonstrate functional swallow for  -MM Patient will demonstrate functional swallow for  -MM Patient will demonstrate functional swallow for  -BN Patient will demonstrate functional swallow for  -BN     Swallow STGs diet tolerance goal selection (SLP);pharyngeal strengthening exercise goal selection (SLP)  -MM diet tolerance goal selection (SLP);pharyngeal strengthening exercise goal selection (SLP)  -MM diet tolerance goal selection (SLP);pharyngeal strengthening exercise goal selection (SLP)  -BN diet tolerance goal selection (SLP)  -BN     Diet Tolerance Goal Selection (SLP) Patient will tolerate trials of  -MM Patient will tolerate trials of  -MM Patient will tolerate trials of  -BN Patient will tolerate trials of  -BN     Pharyngeal Strengthening Exercise  Goal Selection (SLP) pharyngeal strengthening exercise, SLP goal 1  -MM pharyngeal strengthening exercise, SLP goal 1  -MM pharyngeal strengthening exercise, SLP goal 1  -BN --            (LTG) Patient will demonstrate functional swallow for    Diet Texture (Demonstrate functional swallow) regular textures  -MM regular textures  -MM regular textures  -BN regular textures  -BN     Liquid viscosity (Demonstrate functional swallow) thin liquids  -MM thin liquids  -MM thin liquids  -BN thin liquids  -BN     Ida (Demonstrate functional swallow) independently (over 90% accuracy)  -MM independently (over 90% accuracy)  -MM independently (over 90% accuracy)  -BN independently (over 90% accuracy)  -BN     Time Frame (Demonstrate functional swallow) by discharge  -MM by discharge  -MM by discharge  -BN by discharge  -BN     Barriers (Demonstrate functional swallow) Currently on liquids only until cleared by MD  -MM Currently on liquids only until cleared by MD  -MM Currently on liquids only until cleared by MD  -BN Currently on liquids only until cleared by MD  -BN     Progress/Outcomes (Demonstrate functional swallow) goal partially met  -MM continuing progress toward goal  -MM continuing progress toward goal  -BN continuing progress toward goal  -BN     Comment (Demonstrate functional swallow) Cleared for GI soft by MD on 7/4.  -MM -- -- --            (STG) Patient will tolerate trials of    Consistencies Trialed (Tolerate trials) regular textures;thin liquids  -MM regular textures;thin liquids  -MM regular textures;thin liquids  -BN regular textures;thin liquids  -BN     Desired Outcome (Tolerate trials) without signs/symptoms of aspiration  -MM without signs/symptoms of aspiration  -MM without signs/symptoms of aspiration  -BN without signs/symptoms of aspiration  -BN     Ida (Tolerate trials) independently (over 90% accuracy)  -MM independently (over 90% accuracy)  -MM independently (over 90% accuracy)   -BN independently (over 90% accuracy)  -BN     Time Frame (Tolerate trials) by discharge  -MM by discharge  -MM by discharge  -BN by discharge  -BN     Progress/Outcomes (Tolerate trials) goal partially met  -MM continuing progress toward goal  -MM continuing progress toward goal  -BN continuing progress toward goal  -BN     Comment (Tolerate trials) On clear liquids until cleared by MD  -MM -- --  on full liquids until cleared by MD  -CARRIE On clear liquids until cleared by MD  BRANT            (STG) Pharyngeal Strengthening Exercise Goal 1 (SLP)    Activity (Pharyngeal Strengthening Goal 1, SLP) increase squeeze/positive pressure generation  -MM increase squeeze/positive pressure generation  -MM increase squeeze/positive pressure generation  -BN --     Increase Squeeze/Positive Pressure Generation hard effortful swallow  -MM hard effortful swallow  -MM hard effortful swallow  -BN --     [place]/Accuracy (Pharyngeal Strengthening Goal 1, SLP) independently (over 90% accuracy)  -MM independently (over 90% accuracy)  -MM independently (over 90% accuracy)  -BN --     Time Frame (Pharyngeal Strengthening Goal 1, SLP) short term goal (STG);by discharge  -MM short term goal (STG);by discharge  -MM short term goal (STG);by discharge  -BN --     Barriers (Pharyngeal Strengthening Goal 1, SLP) none  -MM none  -MM -- --     Progress/Outcomes (Pharyngeal Strengthening Goal 1, SLP) goal met  -MM goal met  -MM new goal  -BN --           User Key  (r) = Recorded By, (t) = Taken By, (c) = Cosigned By    Initials Name Effective Dates    Amalia Weems MS-CCC/SLP, [facility] 06/15/22 -     Mahi Avitia MS CCC-SLP 06/16/21 -                 EDUCATION  The patient has been educated in the following areas:   Dysphagia (Swallowing Impairment).         SLP GOALS     Row Name 07/01/22 0916 06/30/22 1001 06/29/22 0282       (STG) Pharyngeal Strengthening Exercise Goal 1 (SLP)    Activity (Pharyngeal Strengthening Goal 1,  SLP) increase squeeze/positive pressure generation  -MM increase squeeze/positive pressure generation  -MM increase squeeze/positive pressure generation  -BN    Increase Squeeze/Positive Pressure Generation hard effortful swallow  -MM hard effortful swallow  -MM hard effortful swallow  -BN    Cherry/Accuracy (Pharyngeal Strengthening Goal 1, SLP) independently (over 90% accuracy)  -MM independently (over 90% accuracy)  -MM independently (over 90% accuracy)  -BN    Time Frame (Pharyngeal Strengthening Goal 1, SLP) short term goal (STG);by discharge  -MM short term goal (STG);by discharge  -MM short term goal (STG);by discharge  -BN    Barriers (Pharyngeal Strengthening Goal 1, SLP) none  -MM none  -MM --    Progress/Outcomes (Pharyngeal Strengthening Goal 1, SLP) goal met  -MM goal met  -MM new goal  -BN          User Key  (r) = Recorded By, (t) = Taken By, (c) = Cosigned By    Initials Name Provider Type    Amalia Weems MS-CCC/SLP, [facility] Speech and Language Pathologist    Mahi Avitia MS CCC-SLP Speech and Language Pathologist                     Time Calculation:    Time Calculation- SLP     Row Name 07/01/22 0942             Time Calculation- SLP    SLP Start Time 0916  -MM      SLP Stop Time 0942  -MM      SLP Time Calculation (min) 26 min  -MM      SLP Received On 07/01/22  -MM              Untimed Charges    24128-BR Treatment Swallow Minutes 26  -MM              Total Minutes    Untimed Charges Total Minutes 26  -MM       Total Minutes 26  -MM            User Key  (r) = Recorded By, (t) = Taken By, (c) = Cosigned By    Initials Name Provider Type    Mahi Avitia MS CCC-SLP Speech and Language Pathologist                Therapy Charges for Today     Code Description Service Date Service Provider Modifiers Qty    15327588183 HC ST TREATMENT SWALLOW 3 6/30/2022 Mahi Ascencio MS CCC-SLP GN 1    [id number] HC ST TREATMENT SWALLOW 2 7/1/2022 Mahi Ascencio MS  CCC-SLP GN 1               SLP Discharge Summary  Anticipated Discharge Disposition (SLP): unknown  Reason for Discharge: all goals and outcomes met, no further needs identified  Progress Toward Achieving Short/long Term Goals: all goals met within established timelines  Discharge Destination: other (see comments) (Remains in acute care)    Mahi Ascencio, MS CCC-SLP  7/1/2022

## 2022-07-01 NOTE — PROGRESS NOTES
Nancy Park MD - General Surgery  Progress Note     LOS: 11 days   Patient Care Team:  Tenzin Sam MD as PCP - General (Family Medicine)      Subjective     Interval History:     Tolerating full liquids. Denies nausea and vomiting. Staples removed. Working with therapy.     Objective     Vital Signs  Temp:  [97.7 °F (36.5 °C)-98.2 °F (36.8 °C)] 98.2 °F (36.8 °C)  Heart Rate:  [75-88] 77  Resp:  [18] 18  BP: (127-139)/(69-88) 127/69    Physical Exam:  General appearance - alert, well appearing, and in no distress  Mental status - alert and more oriented   Heart - normal rate   Abdomen - soft, non-tender, non-distended, staples removed and wound intact. Drain removed.     Results Review:    Lab Results (last 24 hours)     Procedure Component Value Units Date/Time    Basic Metabolic Panel [[id number]]  (Abnormal) Collected: 07/01/22 0514    Specimen: Blood Updated: 07/01/22 0545     Glucose 92 mg/dL      BUN 23 mg/dL      Creatinine 0.68 mg/dL      Sodium 136 mmol/L      Potassium 3.9 mmol/L      Chloride 103 mmol/L      CO2 23.0 mmol/L      Calcium 8.6 mg/dL      BUN/Creatinine Ratio 33.8     Anion Gap 10.0 mmol/L      eGFR 92.8 mL/min/1.73      Comment: National Kidney Foundation and American Society of Nephrology (ASN) Task Force recommended calculation based on the Chronic Kidney Disease Epidemiology Collaboration (CKD-EPI) equation refit without adjustment for race.       Narrative:      GFR Normal >60  Chronic Kidney Disease <60  Kidney Failure <15      Phosphorus [[id number]]  (Normal) Collected: 07/01/22 0514    Specimen: Blood Updated: 07/01/22 0545     Phosphorus 2.6 mg/dL     Magnesium [[id number]]  (Normal) Collected: 07/01/22 0514    Specimen: Blood Updated: 07/01/22 0545     Magnesium 1.9 mg/dL     POC Glucose Once [[id number]]  (Normal) Collected: 06/30/22 2152    Specimen: Blood Updated: 06/30/22 2203     Glucose 87 mg/dL      Comment: : [id number] Meghna CaryMeter ID: [id number]        POC Glucose Once [[id number]]  (Normal) Collected: 06/30/22 1216    Specimen: Blood Updated: 06/30/22 1227     Glucose 119 mg/dL      Comment: : [id number] Brant Mehta ID: [id number]       Blood Culture With DENINSE - Blood, Blood, Central Line [[id number]]  (Normal) Collected: 06/27/22 1011    Specimen: Blood, Central Line Updated: 06/30/22 1048     Blood Culture No growth at 3 days    Blood Culture With DENNISE - Blood, Hand, Left [[id number]]  (Normal) Collected: 06/27/22 1001    Specimen: Blood from Hand, Left Updated: 06/30/22 1032     Blood Culture No growth at 3 days        Imaging Results (Last 24 Hours)     ** No results found for the last 24 hours. **            Assessment & Plan       Mr. Temple is an 81 year old male who presented with hypotension and anemia and was found on endoscopy to have a bleeding duodenal ulcer that was not able to be controlled endoscopically. He underwent an exploratory laparotomy with duodenotomy and gastrotomy with oversewing of bleeding duodenal ulcer and closure of the above.      - CT with PO contrast with no leak from repair site.   - full liquids with TID boost. Okay to DC TPN if tolerates TID boost  - Continue BID PPI   - Patient can be advanced to GI soft diet two weeks post op which will be Monday 7/4/22.     Nancy Park MD  07/01/22  07:54 CDT

## 2022-07-01 NOTE — CASE MANAGEMENT/SOCIAL WORK
Continued Stay Note   [place]     Patient Name: Echo Temple  MRN: [id number]  Today's Date: 7/1/2022    Admit Date: 6/19/2022     Discharge Plan     Row Name 07/01/22 1100       Plan    Plan Referral to [facility]    Patient/Family in Agreement with Plan yes    Plan Comments Rec'd a call from [facility] from [facility] [phone] and insurance is not going to approve acute rehab unless he has had a stroke. This is the criteria for his particular insurance apparently. Have talked with pt's daughter, Aprilla [phone], and she has requested [facility]ck[facility]. Referral called to [facility] [phone].               Discharge Codes    No documentation.               Expected Discharge Date and Time     Expected Discharge Date Expected Discharge Time    Jul 1, 2022             SANJANA Marinelli

## 2022-07-01 NOTE — THERAPY TREATMENT NOTE
Acute Care - Physical Therapy Treatment Note  [facility]     Patient Name: Echo Temple  : 1940  MRN: [id number]  Today's Date: 2022      Visit Dx:     ICD-10-CM ICD-9-CM   1. Hypotension, unspecified hypotension type  I95.9 458.9   2. Impaired mobility  Z74.09 799.89   3. Decreased activities of daily living (ADL)  Z78.9 V49.89   4. Dysphagia, unspecified type  R13.10 787.20     Patient Active Problem List   Diagnosis   • Hypotension   • Bleeding duodenal ulcer   • Acute blood loss anemia   • Sepsis (HCC)   • Diarrhea     Past Medical History:   Diagnosis Date   • Cancer (HCC)    • Hypertension      Past Surgical History:   Procedure Laterality Date   • ENDOSCOPY N/A 2022    Procedure: ESOPHAGOGASTRODUODENOSCOPY WITH ANESTHESIA;  Surgeon: Rene Brice MD;  Location: [facility] ENDOSCOPY;  Service: Gastroenterology;  Laterality: N/A;  pre anemia   post duodenal ulcer   Dr. Sam   • EXPLORATORY LAPAROTOMY N/A 2022    Procedure: LAPAROTOMY EXPLORATORY;  Surgeon: Nancy Park MD;  Location: [facility] OR;  Service: General;  Laterality: N/A;   • HEMORRHOIDECTOMY       PT Assessment (last 12 hours)     PT Evaluation and Treatment     Row Name 22 1444          Physical Therapy Time and Intention    Subjective Information complains of;weakness  -     Document Type therapy note (daily note)  -     Mode of Treatment physical therapy  -     Row Name 22 1444          General Information    Existing Precautions/Restrictions fall  -     Row Name 22 1444          Pain    Pretreatment Pain Rating 0/10 - no pain  -     Posttreatment Pain Rating 0/10 - no pain  -     Pre/Posttreatment Pain Comment pt stated he has some chronic pain but nothiing new  -     Row Name 22 1444          Bed Mobility    Supine-Sit [place] (Bed Mobility) verbal cues;contact guard  -     Sit-Supine [place] (Bed Mobility) supervision  -     Row Name 22 1446           Transfers    Stand-Sit [place] (Transfers) verbal cues;contact guard  -SHANNAN     Row Name 07/01/22 1444          Sit-Stand Transfer    Assistive Device (Sit-Stand Transfers) walker, front-wheeled  -SHANNAN     Row Name 07/01/22 1444          Stand-Sit Transfer    Assistive Device (Stand-Sit Transfers) walker, front-wheeled  -SHANNAN     Row Name 07/01/22 1444          Gait/Stairs (Locomotion)    [place]wen Level (Gait) verbal cues;contact guard  -SHANNAN     Assistive Device (Gait) walker, front-wheeled  -SHANNAN     Distance in Feet (Gait) 20' X 4 in the room  -SHANNAN     Deviations/Abnormal Patterns (Gait) gait speed decreased  -SHANNAN     Bilateral Gait Deviations forward flexed posture  -SHANNAN     Row Name 07/01/22 1444          Motor Skills    Therapeutic Exercise aerobic  -SHANNAN     Row Name 07/01/22 1444          Aerobic Exercise    Comment, Aerobic Exercise (Therapeutic Exercise) sitting EOB, AROM BLE X 20  -SHANNAN     Row Name             Wound 06/20/22 1603 midline abdomen Incision    Wound - Properties Group Placement Date: 06/20/22  -KT Placement Time: 1603  -KT Present on Hospital Admission: N  -KT Orientation: midline  -KT Location: abdomen  -KT Primary Wound Type: Incision  -KT     Retired Wound - Properties Group Placement Date: 06/20/22  -KT Placement Time: 1603  -KT Present on Hospital Admission: N  -KT Orientation: midline  -KT Location: abdomen  -KT Primary Wound Type: Incision  -KT     Retired Wound - Properties Group Date first assessed: 06/20/22  -KT Time first assessed: 1603  -KT Present on Hospital Admission: N  -KT Location: abdomen  -KT Primary Wound Type: Incision  -KT     Row Name 07/01/22 1444          Positioning and Restraints    Pre-Treatment Position in bed  -SHANNAN     Post Treatment Position bed  -SHANNAN     In Bed side lying right;call light within reach;encouraged to call for assist;side rails up x2  -SHANNAN           User Key  (r) = Recorded By, (t) = Taken By, (c) = Cosigned By    Initials Name Provider Type    SHANNAN  iMr Coburn, TAY Physical Therapist Assistant    Amandeep Da Silva, RN Registered Nurse                Physical Therapy Education                 Title: PT OT SLP Therapies (In Progress)     Topic: Physical Therapy (In Progress)     Point: Mobility training (Done)     Learning Progress Summary           Patient Acceptance, E, VU by  at 7/1/2022 1444    Comment: safety with mobility.  Progression with POC.  home with HH vs SNF    Acceptance, E,TB, VU by  at 6/28/2022 1411    Comment: bed mobility    Acceptance, E, VU,DU by BRYAN at 6/26/2022 1325    Comment: Pt presents to PT s/p laparotomy exploratory resulting from a bleeding duodenal ulcer. Pt was educated on the benefits of ambulation and moving around to increase independence and functional mobility. Anticipate d.c to SNF.                   Point: Home exercise program (Done)     Learning Progress Summary           Patient Acceptance, E,TB, VU by  at 6/30/2022 1323    Comment: BLE HEP                   Point: Body mechanics (Not Started)     Learner Progress:  Not documented in this visit.          Point: Precautions (Done)     Learning Progress Summary           Patient Acceptance, E,TB, VU by  at 6/29/2022 1023    Comment: call for assist                               User Key     Initials Effective Dates Name Provider Type Discipline     06/16/21 -  Charlotte Romo PTA Physical Therapist Assistant PT    SHANNAN 12/08/16 -  Mir Coburn PTA Physical Therapist Assistant PT    BRYAN 05/04/22 -  Lokesh Sanchez, ARTUR Student PT Student PT              PT Recommendation and Plan     Plan of Care Reviewed With: patient  Progress: improving  Outcome Evaluation: Pt was able to perform bed mobility with supervision.  Transfered sit to stand with CGA.  Amb 20' X 4 with one sitting rest, with RWX and CGA.  Will continue to work with pt to increase strength and to progress pt to be independent with all mobility.   Outcome Measures     Row Name 07/01/22 3181  06/30/22 1500 06/30/22 1300       How much help from another person do you currently need...    Turning from your back to your side while in flat bed without using bedrails? 3  -SHANNAN -- 3  -AH    Moving from lying on back to sitting on the side of a flat bed without bedrails? 3  -SHANNAN -- 3  -AH    Moving to and from a bed to a chair (including a wheelchair)? 3  -SHANNAN -- 3  -AH    Standing up from a chair using your arms (e.g., wheelchair, bedside chair)? 3  -SHANNAN -- 3  -AH    Climbing 3-5 steps with a railing? 2  -SHANNAN -- 2  -AH    To walk in hospital room? 3  -SHANNAN -- 3  -AH    AM-PAC 6 Clicks Score (PT) 17  -SHANNAN -- 17  -AH       How much help from another is currently needed...    Putting on and taking off regular lower body clothing? -- 2  -MW --    Bathing (including washing, rinsing, and drying) -- 2  -MW --    Toileting (which includes using toilet bed pan or urinal) -- 2  -MW --    Putting on and taking off regular upper body clothing -- 2  -MW --    Taking care of personal grooming (such as brushing teeth) -- 3  -MW --    Eating meals -- 3  -MW --    AM-PAC 6 Clicks Score (OT) -- 14  -MW --       Functional Assessment    Outcome Measure Options AM-[facility] 6 Clicks Basic Mobility (PT)  - -- AM-[facility] 6 Clicks Basic Mobility (PT)  -    Row Name 06/29/22 1400 06/29/22 1000          How much help from another person do you currently need...    Turning from your back to your side while in flat bed without using bedrails? -- 3  -AH     Moving from lying on back to sitting on the side of a flat bed without bedrails? -- 3  -AH     Moving to and from a bed to a chair (including a wheelchair)? -- 3  -AH     Standing up from a chair using your arms (e.g., wheelchair, bedside chair)? -- 3  -AH     Climbing 3-5 steps with a railing? -- 1  -AH     To walk in hospital room? -- 3  -AH     AM-PAC 6 Clicks Score (PT) -- 16  -AH            How much help from another is currently needed...    Putting on and taking off regular lower body  clothing? 2  -MW --     Bathing (including washing, rinsing, and drying) 2  -MW --     Toileting (which includes using toilet bed pan or urinal) 2  -MW --     Putting on and taking off regular upper body clothing 3  -MW --     Taking care of personal grooming (such as brushing teeth) 3  -MW --     Eating meals 3  -MW --     AM-PAC 6 Clicks Score (OT) 15  -MW --            Functional Assessment    Outcome Measure Options AM-PAC 6 Clicks Daily Activity (OT)  -MW AM-PAC 6 Clicks Basic Mobility (PT)  -           User Key  (r) = Recorded By, (t) = Taken By, (c) = Cosigned By    Initials Name Provider Type     Charlotte Romo, PTA Physical Therapist Assistant    Mir Tejada, TAY Physical Therapist Assistant    Abeba Harris, OTR/L Occupational Therapist                 Time Calculation:    PT Charges     Row Name 07/01/22 1444             Time Calculation    Start Time 1444  -SHANNAN      Stop Time 1508  -SHANNAN      Time Calculation (min) 24 min  -SHANNAN      PT Received On 07/01/22  -SHANNAN              Time Calculation- PT    Total Timed Code Minutes- PT 24 minute(s)  -SHANNAN              Timed Charges    12557 - PT Therapeutic Exercise Minutes 10  -SHANNAN      67884 - Gait Training Minutes  14  -SHANNAN              Total Minutes    Timed Charges Total Minutes 24  -SHANNAN       Total Minutes 24  -SHANNAN            User Key  (r) = Recorded By, (t) = Taken By, (c) = Cosigned By    Initials Name Provider Type    Mir Tejada PTA Physical Therapist Assistant              Therapy Charges for Today     Code Description Service Date Service Provider Modifiers Qty    51368239421 HC GAIT TRAINING EA 15 MIN 7/1/2022 Mir Coburn PTA GP 1    69908122357 HC PT THER PROC EA 15 MIN 7/1/2022 Mir Coburn, PTA GP 1          PT G-Codes  Outcome Measure Options: AM-PAC 6 Clicks Basic Mobility (PT)  AM-PAC 6 Clicks Score (PT): 17  AM-PAC 6 Clicks Score (OT): 16    Mir Coburn PTA  7/1/2022

## 2022-07-01 NOTE — PLAN OF CARE
Goal Outcome Evaluation:  Plan of Care Reviewed With: patient           Outcome Evaluation: OT tox complete. Pt completed BUE theraband exercises sitting EOB. Pt tolerated exercises well with rest breaks between sets. Pt with no deficits with sitting balance ~25 minutes. Pt reports fatigue in BUE following exercises. Pt made his bed with CGA to address standing and activity tolerance. Pt with unsteadiness occasionally, but good standing balance overall. Pt was fatigued following standing requiring seated rest break. Pt complete fxl mobility in room, initially taking small shuffling steps, vcs provided and pt improved step pattern. Pt requires vcs to correct forward flexed posture in standing. Pt continues with weakness, decreased activity tolerance, and decreased balance with mobility. Continue OT POC.

## 2022-07-01 NOTE — THERAPY TREATMENT NOTE
Patient Name: Echo Temple  : 1940    MRN: [id number]                              Today's Date: 2022       Admit Date: 2022    Visit Dx:     ICD-10-CM ICD-9-CM   1. Hypotension, unspecified hypotension type  I95.9 458.9   2. Impaired mobility  Z74.09 799.89   3. Decreased activities of daily living (ADL)  Z78.9 V49.89   4. Dysphagia, unspecified type  R13.10 787.20     Patient Active Problem List   Diagnosis   • Hypotension   • Bleeding duodenal ulcer   • Acute blood loss anemia   • Sepsis (HCC)   • Diarrhea     Past Medical History:   Diagnosis Date   • Cancer (HCC)    • Hypertension      Past Surgical History:   Procedure Laterality Date   • ENDOSCOPY N/A 2022    Procedure: ESOPHAGOGASTRODUODENOSCOPY WITH ANESTHESIA;  Surgeon: Rene Brice MD;  Location: [facility] ENDOSCOPY;  Service: Gastroenterology;  Laterality: N/A;  pre anemia   post duodenal ulcer   Dr. Sam   • EXPLORATORY LAPAROTOMY N/A 2022    Procedure: LAPAROTOMY EXPLORATORY;  Surgeon: Nancy Park MD;  Location: [facility] OR;  Service: General;  Laterality: N/A;   • HEMORRHOIDECTOMY        General Information     Row Name 22 1325          OT Time and Intention    Document Type therapy note (daily note)  -VIN (r) AD (t) VIN (c)     Mode of Treatment occupational therapy  -VIN (r) AD (t) VIN (c)     Row Name 22 1325          General Information    Patient Profile Reviewed yes  -JJ (r) AD (t) VIN (c)     Existing Precautions/Restrictions fall  -JJ (r) AD (t) JDEBRA (c)           User Key  (r) = Recorded By, (t) = Taken By, (c) = Cosigned By    Initials Name Provider Type    Ijeoma Dudley OTR/L, CSRS Occupational Therapist    Seema Marrero OT Student OT Student                 Mobility/ADL's     Row Name 22 1325          Bed Mobility    Bed Mobility supine-sit;sit-supine;scooting/bridging  -JDEBRA (r) AD (t) VIN (c)     Scooting/Bridging [place] (Bed Mobility) supervision  -VIN (r) AD (t) VIN (c)      Supine-Sit [place] (Bed Mobility) minimum assist (75% patient effort);verbal cues  -JJ (r) AD (t) JJ (c)     Sit-Supine [place] (Bed Mobility) supervision  -JJ (r) AD (t) JJ (c)     Assistive Device (Bed Mobility) head of bed elevated  -JJ (r) AD (t) JJ (c)     Row Name 07/01/22 1325          Transfers    Transfers sit-stand transfer;stand-sit transfer  -JJ (r) AD (t) JJ (c)     Sit-Stand [place] (Transfers) contact guard  -JJ (r) AD (t) JJ (c)     Stand-Sit [place] (Transfers) contact guard;verbal cues  -JJ (r) AD (t) JJ (c)     Row Name 07/01/22 1325          Functional Mobility    Functional Mobility- Ind. Level contact guard assist  -JJ (r) AD (t) JJ (c)     Functional Mobility- Device walker, front-wheeled  -JJ (r) AD (t) JJ (c)     Functional Mobility- Comment Pt completed ambulation in room. Pt initally with decreased step length and shuffling. vcs provided, pt able to correct and take larger steps.  -JJ (r) AD (t) JJ (c)           User Key  (r) = Recorded By, (t) = Taken By, (c) = Cosigned By    Initials Name Provider Type    Ijeoma Dudley, OTR/L, CSRS Occupational Therapist    Seema Marrero, OT Student OT Student               Obj/Interventions     Row Name 07/01/22 1325          Shoulder (Therapeutic Exercise)    Shoulder (Therapeutic Exercise) strengthening exercise  -JJ (r) AD (t) JJ (c)     Shoulder Strengthening (Therapeutic Exercise) resistance band;red;bilateral;flexion;aBduction;horizontal aBduction/aDduction;3 repetitions;10 repetitions  -JJ (r) AD (t) JJ (c)     Row Name 07/01/22 1325          Elbow/Forearm (Therapeutic Exercise)    Elbow/Forearm (Therapeutic Exercise) strengthening exercise  -JJ (r) AD (t) JJ (c)     Elbow/Forearm Strengthening (Therapeutic Exercise) bilateral;flexion;extension;resistance band;red;3 sets;10 repetitions  -JJ (r) AD (t) JJ (c)     Row Name 07/01/22 1325          Motor Skills    Motor Skills functional endurance  -VIN (r) CHAITANYA (t) VIN (c)      Functional Endurance Pt stood to make his bed with CGA to address activity and standing tolerance. Pt with occasional unsteadiness, overall balance was good. Pt completed task with no little fatigue, and required vcs for correcting to neutral posture when task was completed.  -JJ (r) AD (t) JJ (c)     Therapeutic Exercise shoulder;elbow/forearm  -JJ (r) AD (t) JJ (c)           User Key  (r) = Recorded By, (t) = Taken By, (c) = Cosigned By    Initials Name Provider Type    Ijeoma Dudley, OTR/L, CSRS Occupational Therapist    Seema Marrero, OT Student OT Student               Goals/Plan    No documentation.                Clinical Impression     Row Name 07/01/22 1325          Pain Assessment    Pretreatment Pain Rating 0/10 - no pain  -JDEBRA (r) AD (t) JJ (c)     Posttreatment Pain Rating 0/10 - no pain  -JJ (r) AD (t) JJ (c)     Row Name 07/01/22 1326          Plan of Care Review    Plan of Care Reviewed With patient  -VIN (r) AD (t) JDEBRA (c)     Outcome Evaluation OT tox complete. Pt completed BUE theraband exercises sitting EOB. Pt tolerated exercises well with rest breaks between sets. Pt with no deficits with sitting balance ~25 minutes. Pt reports fatigue in BUE following exercises. Pt made his bed with CGA to address standing and activity tolerance. Pt with unsteadiness occasionally, but good standing balance overall. Pt was fatigued following standing requiring seated rest break. Pt complete fxl mobility in room, initially taking small shuffling steps, vcs provided and pt improved step pattern. Pt requires vcs to correct forward flexed posture in standing. Pt continues with weakness, decreased activity tolerance, and decreased balance with mobility. Continue OT POC.  -JJ (r) AD (t) JJ (c)     Row Name 07/01/22 1327          Positioning and Restraints    Pre-Treatment Position in bed  -JJ (r) AD (t) JJ (c)     Post Treatment Position bed  -JJ (r) AD (t) JJ (c)     In Bed fowlers;call light within  reach;encouraged to call for assist;side rails up x2  -JJ (r) AD (t) JJ (c)           User Key  (r) = Recorded By, (t) = Taken By, (c) = Cosigned By    Initials Name Provider Type    Ijeoma Dudley OTR/L, CSRS Occupational Therapist    Seema Marrero, OT Student OT Student               Outcome Measures     Row Name 07/01/22 1325          How much help from another is currently needed...    Putting on and taking off regular lower body clothing? 2  -JJ (r) AD (t) JJ (c)     Bathing (including washing, rinsing, and drying) 2  -JJ (r) AD (t) JJ (c)     Toileting (which includes using toilet bed pan or urinal) 2  -JJ (r) AD (t) JJ (c)     Putting on and taking off regular upper body clothing 3  -JJ (r) AD (t) JJ (c)     Taking care of personal grooming (such as brushing teeth) 3  -JJ (r) AD (t) JJ (c)     Eating meals 4  -JJ (r) AD (t) JJ (c)     AM-PAC 6 Clicks Score (OT) 16  -JJ (r) AD (t)     Row Name 07/01/22 1329          Functional Assessment    Outcome Measure Options AM-PAC 6 Clicks Daily Activity (OT)  -JJ (r) AD (t) JJ (c)           User Key  (r) = Recorded By, (t) = Taken By, (c) = Cosigned By    Initials Name Provider Type    Ijeoma Dudley OTR/L, CSRS Occupational Therapist    Seema Marrero, OT Student OT Student                Occupational Therapy Education                 Title: PT OT SLP Therapies (In Progress)     Topic: Occupational Therapy (Done)     Point: ADL training (Done)     Description:   Instruct learner(s) on proper safety adaptation and remediation techniques during self care or transfers.   Instruct in proper use of assistive devices.              Learning Progress Summary           Patient Acceptance, E, VU by CHAIATNYA at 7/1/2022 1430    Acceptance, E,D, VU,NR by MW at 6/30/2022 1519    Acceptance, E,D, VU by MW at 6/29/2022 1418    Acceptance, E,D, VU,NR by  at 6/27/2022 1415    Acceptance, E, LIBBY by AD at 6/26/2022 1454                   Point: Home exercise program  (Done)     Description:   Instruct learner(s) on appropriate technique for monitoring, assisting and/or progressing therapeutic exercises/activities.              Learning Progress Summary           Patient Acceptance, E, VU by AD at 7/1/2022 1430    Acceptance, E,D, VU,NR by  at 6/30/2022 1519    Acceptance, E,D, VU by MW at 6/29/2022 1418                   Point: Precautions (Done)     Description:   Instruct learner(s) on prescribed precautions during self-care and functional transfers.              Learning Progress Summary           Patient Acceptance, E, VU by AD at 7/1/2022 1430    Acceptance, E,D, VU,NR by MW at 6/30/2022 1519    Acceptance, E,D, VU by  at 6/29/2022 1418    Acceptance, E, VU by AD at 6/26/2022 1454                   Point: Body mechanics (Done)     Description:   Instruct learner(s) on proper positioning and spine alignment during self-care, functional mobility activities and/or exercises.              Learning Progress Summary           Patient Acceptance, E, VU by AD at 7/1/2022 1430    Acceptance, E,D, VU,NR by  at 6/30/2022 1519    Acceptance, E,D, VU by  at 6/29/2022 1418                               User Key     Initials Effective Dates Name Provider Type Discipline     06/16/21 -  Suze Abarca, OTR/L Occupational Therapist OT     08/28/18 -  Abeba Borges, OTR/L Occupational Therapist OT     05/04/22 -  Seema Castellanos OT Student OT Student OT              OT Recommendation and Plan  Planned Therapy Interventions (OT): activity tolerance training, BADL retraining, functional balance retraining, occupation/activity based interventions, patient/caregiver education/training, strengthening exercise, transfer/mobility retraining  Therapy Frequency (OT): 5 times/wk  Plan of Care Review  Plan of Care Reviewed With: patient  Outcome Evaluation: OT tox complete. Pt completed BUE theraband exercises sitting EOB. Pt tolerated exercises well with rest breaks between sets. Pt  with no deficits with sitting balance ~25 minutes. Pt reports fatigue in BUE following exercises. Pt made his bed with CGA to address standing and activity tolerance. Pt with unsteadiness occasionally, but good standing balance overall. Pt was fatigued following standing requiring seated rest break. Pt complete fxl mobility in room, initially taking small shuffling steps, vcs provided and pt improved step pattern. Pt requires vcs to correct forward flexed posture in standing. Pt continues with weakness, decreased activity tolerance, and decreased balance with mobility. Continue OT POC.     Time Calculation:    Time Calculation- OT     Row Name 07/01/22 1325             Time Calculation- OT    OT Start Time 1325  -JJ (r) AD (t) JJ (c)      OT Stop Time 1409  -JJ (r) AD (t) JJ (c)      OT Time Calculation (min) 44 min  -JJ (r) AD (t)      Total Timed Code Minutes- OT 44 minute(s)  -JJ (r) AD (t) JJ (c)              Timed Charges    91271 - OT Therapeutic Exercise Minutes 24  -JJ (r) AD (t) JJ (c)      31696 - OT Therapeutic Activity Minutes 20  -JJ (r) AD (t) JJ (c)              Total Minutes    Timed Charges Total Minutes 44  -JJ (r) AD (t)       Total Minutes 44  -JJ (r) AD (t)            User Key  (r) = Recorded By, (t) = Taken By, (c) = Cosigned By    Initials Name Provider Type    Ijeoma Dudley OTR/L, CSRS Occupational Therapist    Seema Marrero OT Student OT Student                       Seema Castellanos OT Student  7/1/2022

## 2022-07-01 NOTE — CASE MANAGEMENT/SOCIAL WORK
Continued Stay Note  [facility]     Patient Name: Echo Temple  MRN: [id number]  Today's Date: 7/1/2022    Admit Date: 6/19/2022     Discharge Plan     Row Name 07/01/22 1607       Plan    Plan [facility] pending precert    Plan Comments [facility] has offered. Pt's daughter, Aprilla [phone], has accepted. They are starting precert.               Discharge Codes    No documentation.               Expected Discharge Date and Time     Expected Discharge Date Expected Discharge Time    Jul 5, 2022             SANJANA Marinelli

## 2022-07-01 NOTE — PROGRESS NOTES
[facility] Medicine Services  INPATIENT PROGRESS NOTE    Patient Name: Echo Temple  Date of Admission: 6/19/2022  Today's Date: 07/01/22  Length of Stay: 11  Primary Care Physician: Tenzin Sam MD    Subjective   Chief Complaint: Follow-up bleeding ulcer.    HPI:  Patient is about the same.  On room air.  No nausea.  Tolerating liquids.  No chest pain.  1 liquid stool this morning per nursing.    Review of Systems   All pertinent negatives and positives are as above. All other systems have been reviewed and are negative unless otherwise stated.     Objective    Temp:  [97.7 °F (36.5 °C)-98.6 °F (37 °C)] 98.6 °F (37 °C)  Heart Rate:  [75-88] 78  Resp:  [16-18] 16  BP: (127-139)/(69-84) 130/76  Physical Exam  GEN: Awake, alert, interactive, in NAD  HEENT:  PERRLA, EOMI, Anicteric, Trachea midline  Lungs: no wheezing/rales/rhonchi  Heart: RRR, +S1/s2, no rub  ABD: midline epigastric incision w/o drainage, +BS, no guarding/rebound  Extremities: atraumatic, no cyanosis, no edema  Skin: no rashes or petechiae  Neuro: AAOx3, no focal deficits  Psych: normal mood & affect        Results Review:  I have reviewed the labs, radiology results, and diagnostic studies.    Laboratory Data:   Results from last 7 days   Lab Units 06/30/22  0537 06/29/22  0513 06/28/22  0513   WBC 10*3/mm3 11.97* 14.88* 21.78*   HEMOGLOBIN g/dL 8.1* 8.3* 9.2*   HEMATOCRIT % 25.7* 26.2* 28.6*   PLATELETS 10*3/mm3 301 279 313        Results from last 7 days   Lab Units 07/01/22  0514 06/30/22  0537 06/29/22  0513 06/28/22  0513 06/27/22  0457   SODIUM mmol/L 136 135* 133* 133* 135*   POTASSIUM mmol/L 3.9 3.5 3.5 3.9 4.0   CHLORIDE mmol/L 103 101 100 100 98   CO2 mmol/L 23.0 27.0 25.0 23.0 23.0   BUN mg/dL 23 31* 39* 38* 28*   CREATININE mg/dL 0.68* 0.79 0.77 0.92 0.73*   CALCIUM mg/dL 8.6 8.4* 8.2* 8.5* 9.0   BILIRUBIN mg/dL  --   --  0.3 0.6 0.8   ALK PHOS U/L  --   --  125* 147* 133*   ALT (SGPT) U/L   --   --  28 38 36   AST (SGOT) U/L  --   --  27 43* 68*   GLUCOSE mg/dL 92 112* 114* 133* 104*       Culture Data:   No results found for: BLOODCX, URINECX, WOUNDCX, MRSACX, RESPCX, STOOLCX    Radiology Data:   Imaging Results (Last 24 Hours)     ** No results found for the last 24 hours. **          I have reviewed the patient's current medications.     Assessment/Plan     Active Hospital Problems    Diagnosis    • **Bleeding duodenal ulcer    • Sepsis (HCC)    • Diarrhea    • Acute blood loss anemia    • Hypotension        Plan:  #1 sepsis -in the setting of C. difficile colitis.  Do not suspect patient has pneumonia.  Likely some atelectasis.  Incentive spirometer as able.  Patient's had intermittent confusion but improving.  Blood cultures so far negative.  Fever resolved. Heart rate improved.  Blood pressure stable.  IV Flagyl transition to p.o. Vanco.    #2 c diff colitis-as above patient with 3 bouts of diarrhea 6/27 with fever and signs of sepsis.  C. difficile came back positive.  As above has been on IV Flagyl and position to p.o. Vanco today.  Per nursing he is tolerating.    #3 duodenal ulcer/GI bleed -could not be controlled via EGD and he underwent ex lap with oversewing of the ulcer on 6/20. NGT now out.  Continue IV PPI twice daily for now.  Will likely transition to p.o. soon.    #4 acute blood loss anemia -from #3 above.  Hemoglobin has been stable.    #5 hypertension -stable on lisinopril    #6 malnutrition -status post TPN and getting clear liquid diet with boost 3 times daily.  Okay for GI soft diet starting 7/4 per surgery.      Discharge Planning: Ongoing.  Plan for acute rehab placement at discharge.  Discussed with case management.  Pre-CERT pending.    Electronically signed by Yoan Mckeon DO, 07/01/22, 10:24 CDT.

## 2022-07-02 LAB
ANION GAP SERPL CALCULATED.3IONS-SCNC: 7 MMOL/L (ref 5–15)
BACTERIA SPEC AEROBE CULT: NORMAL
BACTERIA SPEC AEROBE CULT: NORMAL
BUN SERPL-MCNC: 21 MG/DL (ref 8–23)
BUN/CREAT SERPL: 25.6 (ref 7–25)
CALCIUM SPEC-SCNC: 9 MG/DL (ref 8.6–10.5)
CHLORIDE SERPL-SCNC: 102 MMOL/L (ref 98–107)
CO2 SERPL-SCNC: 26 MMOL/L (ref 22–29)
CREAT SERPL-MCNC: 0.82 MG/DL (ref 0.76–1.27)
DEPRECATED RDW RBC AUTO: 45.4 FL (ref 37–54)
EGFRCR SERPLBLD CKD-EPI 2021: 87.7 ML/MIN/1.73
ERYTHROCYTE [DISTWIDTH] IN BLOOD BY AUTOMATED COUNT: 13.9 % (ref 12.3–15.4)
GLUCOSE SERPL-MCNC: 85 MG/DL (ref 65–99)
HCT VFR BLD AUTO: 26.2 % (ref 37.5–51)
HGB BLD-MCNC: 8.6 G/DL (ref 13–17.7)
MAGNESIUM SERPL-MCNC: 1.8 MG/DL (ref 1.6–2.4)
MCH RBC QN AUTO: 29.2 PG (ref 26.6–33)
MCHC RBC AUTO-ENTMCNC: 32.8 G/DL (ref 31.5–35.7)
MCV RBC AUTO: 88.8 FL (ref 79–97)
PHOSPHATE SERPL-MCNC: 2.8 MG/DL (ref 2.5–4.5)
PLATELET # BLD AUTO: 349 10*3/MM3 (ref 140–450)
PMV BLD AUTO: 10.1 FL (ref 6–12)
POTASSIUM SERPL-SCNC: 3.9 MMOL/L (ref 3.5–5.2)
RBC # BLD AUTO: 2.95 10*6/MM3 (ref 4.14–5.8)
SODIUM SERPL-SCNC: 135 MMOL/L (ref 136–145)
WBC NRBC COR # BLD: 14.81 10*3/MM3 (ref 3.4–10.8)

## 2022-07-02 PROCEDURE — 99024 POSTOP FOLLOW-UP VISIT: CPT | Performed by: SPECIALIST

## 2022-07-02 PROCEDURE — 83735 ASSAY OF MAGNESIUM: CPT | Performed by: INTERNAL MEDICINE

## 2022-07-02 PROCEDURE — 80048 BASIC METABOLIC PNL TOTAL CA: CPT | Performed by: INTERNAL MEDICINE

## 2022-07-02 PROCEDURE — 85027 COMPLETE CBC AUTOMATED: CPT | Performed by: INTERNAL MEDICINE

## 2022-07-02 PROCEDURE — 84100 ASSAY OF PHOSPHORUS: CPT | Performed by: INTERNAL MEDICINE

## 2022-07-02 RX ORDER — PANTOPRAZOLE SODIUM 40 MG/1
40 TABLET, DELAYED RELEASE ORAL
Status: DISCONTINUED | OUTPATIENT
Start: 2022-07-03 | End: 2022-07-06 | Stop reason: HOSPADM

## 2022-07-02 RX ADMIN — VANCOMYCIN HYDROCHLORIDE 125 MG: 125 CAPSULE ORAL at 01:36

## 2022-07-02 RX ADMIN — PANTOPRAZOLE SODIUM 40 MG: 40 INJECTION, POWDER, FOR SOLUTION INTRAVENOUS at 10:02

## 2022-07-02 RX ADMIN — PANTOPRAZOLE SODIUM 40 MG: 40 INJECTION, POWDER, FOR SOLUTION INTRAVENOUS at 21:15

## 2022-07-02 RX ADMIN — VANCOMYCIN HYDROCHLORIDE 125 MG: 125 CAPSULE ORAL at 18:23

## 2022-07-02 RX ADMIN — VANCOMYCIN HYDROCHLORIDE 125 MG: 125 CAPSULE ORAL at 13:05

## 2022-07-02 RX ADMIN — VANCOMYCIN HYDROCHLORIDE 125 MG: 125 CAPSULE ORAL at 05:42

## 2022-07-02 RX ADMIN — LISINOPRIL 10 MG: 10 TABLET ORAL at 09:59

## 2022-07-02 RX ADMIN — Medication 10 ML: at 21:15

## 2022-07-02 NOTE — PROGRESS NOTES
Yas Lunsford MD FACS  Progress Note     LOS: 12 days   Patient Care Team:  Tenzin Sam MD as PCP - General (Family Medicine)      Subjective     Chief complaint: Bleeding duodenal ulcer    History of Present Illness:   No appetite.  Denies nausea.  +flatus  Pain controlled     The following portions of the patient's history were reviewed and updated as appropriate: allergies, current medications, past family history, past medical history, past social history, past surgical history, and problem list.    Objective       Vital Signs:  Temp:  [97.3 °F (36.3 °C)-98.5 °F (36.9 °C)] 98.3 °F (36.8 °C)  Heart Rate:  [75-81] 78  Resp:  [16-18] 16  BP: (124-142)/(63-84) 124/81    Physical Exam:    General appearance - alert, well appearing, and in no distress  Abdomen - soft, clean, appropriately tender      Results Review:    Lab Results (last 24 hours)     Procedure Component Value Units Date/Time    Blood Culture With DENNISE - Blood, Blood, Central Line [[id number]]  (Normal) Collected: 06/27/22 1011    Specimen: Blood, Central Line Updated: 07/02/22 1048     Blood Culture No growth at 5 days    Blood Culture With DENNISE - Blood, Hand, Left [[id number]]  (Normal) Collected: 06/27/22 1001    Specimen: Blood from Hand, Left Updated: 07/02/22 1032     Blood Culture No growth at 5 days    Basic Metabolic Panel [[id number]]  (Abnormal) Collected: 07/02/22 0519    Specimen: Blood Updated: 07/02/22 0618     Glucose 85 mg/dL      BUN 21 mg/dL      Creatinine 0.82 mg/dL      Sodium 135 mmol/L      Potassium 3.9 mmol/L      Chloride 102 mmol/L      CO2 26.0 mmol/L      Calcium 9.0 mg/dL      BUN/Creatinine Ratio 25.6     Anion Gap 7.0 mmol/L      eGFR 87.7 mL/min/1.73      Comment: National Kidney Foundation and American Society of Nephrology (ASN) Task Force recommended calculation based on the Chronic Kidney Disease Epidemiology Collaboration (CKD-EPI) equation refit without adjustment for race.       Narrative:      GFR  Normal >60  Chronic Kidney Disease <60  Kidney Failure <15      Phosphorus [[id number]]  (Normal) Collected: 07/02/22 0519    Specimen: Blood Updated: 07/02/22 0618     Phosphorus 2.8 mg/dL     Magnesium [[id number]]  (Normal) Collected: 07/02/22 0519    Specimen: Blood Updated: 07/02/22 0618     Magnesium 1.8 mg/dL     CBC (No Diff) [[id number]]  (Abnormal) Collected: 07/02/22 0519    Specimen: Blood Updated: 07/02/22 0555     WBC 14.81 10*3/mm3      RBC 2.95 10*6/mm3      Hemoglobin 8.6 g/dL      Hematocrit 26.2 %      MCV 88.8 fL      MCH 29.2 pg      MCHC 32.8 g/dL      RDW 13.9 %      RDW-SD 45.4 fl      MPV 10.1 fL      Platelets 349 10*3/mm3         Imaging Results (Last 24 Hours)     ** No results found for the last 24 hours. **            Assessment & Plan     S/p [place] patch    ADAT  Continue protonix  Continue tpn until tolerating adequate calories    Yas Lunsford MD  07/02/22  10:58 CDT

## 2022-07-02 NOTE — PLAN OF CARE
Goal Outcome Evaluation:           Progress: no change  Outcome Evaluation: Pt denies pain and nausea, up with assist x1, PO vanc cont. bonny diet, no BM so far this shift, isolation precautions maintained, bnony diet, safety maintained

## 2022-07-02 NOTE — NURSING NOTE
Patient up to bathroom with assist standby. Patient has loose soft stool. Patient alert and oriented x4.

## 2022-07-02 NOTE — PROGRESS NOTES
[facility] Medicine Services  INPATIENT PROGRESS NOTE    Patient Name: Echo Temple  Date of Admission: 6/19/2022  Today's Date: 07/02/22  Length of Stay: 12  Primary Care Physician: Tenzin Sam MD    Subjective   Chief Complaint: Follow-up bleeding ulcer.    HPI:  Patient is doing well.  Continues to be better from mental state.  No fevers.  Vital stable.  Tolerating liquid diet.  No adverse events noted overnight.    Review of Systems   All pertinent negatives and positives are as above. All other systems have been reviewed and are negative unless otherwise stated.     Objective    Temp:  [97.3 °F (36.3 °C)-98.5 °F (36.9 °C)] 98.3 °F (36.8 °C)  Heart Rate:  [75-81] 78  Resp:  [16-18] 16  BP: (124-142)/(63-84) 124/81  Physical Exam  GEN: Awake, alert, interactive, in NAD  HEENT:  PERRLA, EOMI, Anicteric, Trachea midline  Lungs: no wheezing/rales/rhonchi  Heart: RRR, +S1/s2, no rub  ABD: midline epigastric incision w/o drainage, +BS, no guarding/rebound  Extremities: atraumatic, no cyanosis, no edema  Skin: no rashes or petechiae  Neuro: AAOx3, no focal deficits  Psych: normal mood & affect        Results Review:  I have reviewed the labs, radiology results, and diagnostic studies.    Laboratory Data:   Results from last 7 days   Lab Units 07/02/22  0519 06/30/22 0537 06/29/22  0513   WBC 10*3/mm3 14.81* 11.97* 14.88*   HEMOGLOBIN g/dL 8.6* 8.1* 8.3*   HEMATOCRIT % 26.2* 25.7* 26.2*   PLATELETS 10*3/mm3 349 301 279        Results from last 7 days   Lab Units 07/02/22  0519 07/01/22  0514 06/30/22  0537 06/29/22  0513 06/28/22  0513 06/27/22  0457   SODIUM mmol/L 135* 136 135* 133* 133* 135*   POTASSIUM mmol/L 3.9 3.9 3.5 3.5 3.9 4.0   CHLORIDE mmol/L 102 103 101 100 100 98   CO2 mmol/L 26.0 23.0 27.0 25.0 23.0 23.0   BUN mg/dL 21 23 31* 39* 38* 28*   CREATININE mg/dL 0.82 0.68* 0.79 0.77 0.92 0.73*   CALCIUM mg/dL 9.0 8.6 8.4* 8.2* 8.5* 9.0   BILIRUBIN mg/dL  --   --    --  0.3 0.6 0.8   ALK PHOS U/L  --   --   --  125* 147* 133*   ALT (SGPT) U/L  --   --   --  28 38 36   AST (SGOT) U/L  --   --   --  27 43* 68*   GLUCOSE mg/dL 85 92 112* 114* 133* 104*       Culture Data:   No results found for: BLOODCX, URINECX, WOUNDCX, MRSACX, RESPCX, STOOLCX    Radiology Data:   Imaging Results (Last 24 Hours)     ** No results found for the last 24 hours. **          I have reviewed the patient's current medications.     Assessment/Plan     Active Hospital Problems    Diagnosis    • **Bleeding duodenal ulcer    • Sepsis (HCC)    • Diarrhea    • Acute blood loss anemia    • Hypotension        Plan:  #1 sepsis -in the setting of C. difficile colitis.  Do not suspect patient has pneumonia.  Likely some atelectasis.  Incentive spirometer as able.  Patient's had intermittent confusion but improving.  Blood cultures so far negative.  Fever resolved. Heart rate improved.  Blood pressure stable.  IV Flagyl transitioned to p.o. Vanco complete course of care.    #2 c diff colitis-as above patient with 3 bouts of diarrhea 6/27 with fever and signs of sepsis.  C. difficile came back positive.  As above has been on IV Flagyl and position to p.o. Vanco.  Per nursing he is tolerating.    #3 duodenal ulcer/GI bleed -could not be controlled via EGD and he underwent ex lap with oversewing of the ulcer on 6/20. NGT now out.  Continue IV PPI twice daily for now.  We will transition PPI to p.o. tomorrow.    #4 acute blood loss anemia -from #3 above.  Hemoglobin has been stable.    #5 hypertension -stable on lisinopril    #6 malnutrition -status post TPN and getting clear liquid diet with boost 3 times daily.  Okay for GI soft diet starting 7/4 per surgery.      Discharge Planning: Ongoing.  Plan for acute rehab placement at discharge.  Discussed with case management.  Pre-CERT pending.    Electronically signed by Yoan Mckeon DO, 07/02/22, 10:50 CDT.

## 2022-07-03 PROCEDURE — 99024 POSTOP FOLLOW-UP VISIT: CPT | Performed by: SPECIALIST

## 2022-07-03 PROCEDURE — 97116 GAIT TRAINING THERAPY: CPT

## 2022-07-03 RX ORDER — ACETAMINOPHEN 325 MG/1
650 TABLET ORAL EVERY 6 HOURS PRN
Status: DISCONTINUED | OUTPATIENT
Start: 2022-07-03 | End: 2022-07-06 | Stop reason: HOSPADM

## 2022-07-03 RX ADMIN — VANCOMYCIN HYDROCHLORIDE 125 MG: 125 CAPSULE ORAL at 11:09

## 2022-07-03 RX ADMIN — ACETAMINOPHEN 650 MG: 325 TABLET, FILM COATED ORAL at 17:42

## 2022-07-03 RX ADMIN — PANTOPRAZOLE SODIUM 40 MG: 40 TABLET, DELAYED RELEASE ORAL at 08:19

## 2022-07-03 RX ADMIN — LISINOPRIL 10 MG: 10 TABLET ORAL at 08:19

## 2022-07-03 RX ADMIN — VANCOMYCIN HYDROCHLORIDE 125 MG: 125 CAPSULE ORAL at 06:14

## 2022-07-03 RX ADMIN — PANTOPRAZOLE SODIUM 40 MG: 40 TABLET, DELAYED RELEASE ORAL at 17:39

## 2022-07-03 RX ADMIN — VANCOMYCIN HYDROCHLORIDE 125 MG: 125 CAPSULE ORAL at 17:39

## 2022-07-03 RX ADMIN — VANCOMYCIN HYDROCHLORIDE 125 MG: 125 CAPSULE ORAL at 00:25

## 2022-07-03 RX ADMIN — ACETAMINOPHEN 650 MG: 325 TABLET, FILM COATED ORAL at 11:09

## 2022-07-03 NOTE — THERAPY TREATMENT NOTE
Acute Care - Physical Therapy Treatment Note  [facility]     Patient Name: Echo Temple  : 1940  MRN: [id number]  Today's Date: 7/3/2022      Visit Dx:     ICD-10-CM ICD-9-CM   1. Hypotension, unspecified hypotension type  I95.9 458.9   2. Impaired mobility  Z74.09 799.89   3. Decreased activities of daily living (ADL)  Z78.9 V49.89   4. Dysphagia, unspecified type  R13.10 787.20     Patient Active Problem List   Diagnosis   • Hypotension   • Bleeding duodenal ulcer   • Acute blood loss anemia   • Sepsis (HCC)   • Diarrhea     Past Medical History:   Diagnosis Date   • Cancer (HCC)    • Hypertension      Past Surgical History:   Procedure Laterality Date   • ENDOSCOPY N/A 2022    Procedure: ESOPHAGOGASTRODUODENOSCOPY WITH ANESTHESIA;  Surgeon: Rene Brice MD;  Location: [facility] ENDOSCOPY;  Service: Gastroenterology;  Laterality: N/A;  pre anemia   post duodenal ulcer   Dr. Sam   • EXPLORATORY LAPAROTOMY N/A 2022    Procedure: LAPAROTOMY EXPLORATORY;  Surgeon: Nancy Park MD;  Location: [facility] OR;  Service: General;  Laterality: N/A;   • HEMORRHOIDECTOMY       PT Assessment (last 12 hours)     PT Evaluation and Treatment     Row Name 22 0854          Physical Therapy Time and Intention    Subjective Information complains of;weakness  -WK     Document Type therapy note (daily note)  -WK     Mode of Treatment physical therapy  -WK     Row Name 22 0854          General Information    Existing Precautions/Restrictions fall  -WK     Row Name 22 0854          Bed Mobility    Supine-Sit [place] (Bed Mobility) verbal cues;standby assist  -WK     Sit-Supine [place] (Bed Mobility) verbal cues;standby assist  -WK     Row Name 22 0854          Gait/Stairs (Locomotion)    Assistive Device (Gait) walker, front-wheeled;other (see comments)  with and without RW  -WK     Distance in Feet (Gait) 20' x5 without RW min A sitting rest 20'x5 with RW CGA-min A  Pt stated  he wanted to continue to amb and PTA educated pt on safety and fatigue and pt agreed to rest.  -WK     Deviations/Abnormal Patterns (Gait) gait speed decreased  -WK     Bilateral Gait Deviations forward flexed posture  -WK     Comment, (Gait/Stairs) 10'x2 iCGA-min with RW nto bathroom, pt was incontinent as he amb to bathroom. PTA assisted with clean up  -WK     Row Name 07/03/22 0854          Balance    Static Standing Balance contact guard;minimal assist;verbal cues  -WK     Dynamic Standing Balance verbal cues;minimal assist  -WK     Row Name             Wound 06/20/22 1603 midline abdomen Incision    Wound - Properties Group Placement Date: 06/20/22  -KT Placement Time: 1603  -KT Present on Hospital Admission: N  -KT Orientation: midline  -KT Location: abdomen  -KT Primary Wound Type: Incision  -KT     Retired Wound - Properties Group Placement Date: 06/20/22  -KT Placement Time: 1603  -KT Present on Hospital Admission: N  -KT Orientation: midline  -KT Location: abdomen  -KT Primary Wound Type: Incision  -KT     Retired Wound - Properties Group Date first assessed: 06/20/22  -KT Time first assessed: 1603  -KT Present on Hospital Admission: N  -KT Location: abdomen  -KT Primary Wound Type: Incision  -KT     Row Name 07/03/22 0854          Positioning and Restraints    Pre-Treatment Position in bed  -WK     Post Treatment Position bed  refused chair  -WK     In Bed fowlers;call light within reach;encouraged to call for assist  -WK           User Key  (r) = Recorded By, (t) = Taken By, (c) = Cosigned By    Initials Name Provider Type    WK Geovanna Gonzalez PTA Physical Therapist Assistant    KT Amandeep Garcia, RN Registered Nurse                Physical Therapy Education                 Title: PT OT SLP Therapies (In Progress)     Topic: Physical Therapy (In Progress)     Point: Mobility training (Done)     Learning Progress Summary           Patient Acceptance, E, VU by SHANNAN at 7/1/2022 6728    Comment: safety with  mobility.  Progression with POC.  home with HH vs SNF    Acceptance, E,TB, VU by  at 6/28/2022 1411    Comment: bed mobility    Acceptance, E, VU,DU by BRYAN at 6/26/2022 1325    Comment: Pt presents to PT s/p laparotomy exploratory resulting from a bleeding duodenal ulcer. Pt was educated on the benefits of ambulation and moving around to increase independence and functional mobility. Anticipate d.c to SNF.                   Point: Home exercise program (Done)     Learning Progress Summary           Patient Acceptance, E,TB, VU by  at 6/30/2022 1323    Comment: BLE HEP                   Point: Body mechanics (Not Started)     Learner Progress:  Not documented in this visit.          Point: Precautions (Done)     Learning Progress Summary           Patient Acceptance, E,TB, VU by  at 6/29/2022 1023    Comment: call for assist                               User Key     Initials Effective Dates Name Provider Type Discipline     06/16/21 -  Charlotte Romo, PTA Physical Therapist Assistant PT    SHANNAN 12/08/16 -  Mir Coburn, PTA Physical Therapist Assistant PT    BRYAN 05/04/22 -  Lokesh Sanchez PT Student PT Student PT              PT Recommendation and Plan     Plan of Care Reviewed With: patient  Progress: improving  Outcome Evaluation: Pt amb 20'x 5 no RW min A. Pt was unsteady and as he fatigue pt had increase in forward posture and flexed knees. Pt wanted to continue to amb like this and PTA educated pt on safety. Pt educated that he should continue using RW for safety with nsg and other staff. Pt requested to go to the BR and was incontinent. Pt required assist with clean up.   Outcome Measures     Row Name 07/01/22 1444 06/30/22 1500 06/30/22 1300       How much help from another person do you currently need...    Turning from your back to your side while in flat bed without using bedrails? 3  -SHANNAN -- 3  -    Moving from lying on back to sitting on the side of a flat bed without bedrails? 3  -SHANNAN -- 3   -AH    Moving to and from a bed to a chair (including a wheelchair)? 3  -SHANNAN -- 3  -AH    Standing up from a chair using your arms (e.g., wheelchair, bedside chair)? 3  -SHANNAN -- 3  -AH    Climbing 3-5 steps with a railing? 2  -SHANNAN -- 2  -AH    To walk in hospital room? 3  -SHANNAN -- 3  -AH    AM-PAC 6 Clicks Score (PT) 17  -SHANNAN -- 17  -AH       How much help from another is currently needed...    Putting on and taking off regular lower body clothing? -- 2  -MW --    Bathing (including washing, rinsing, and drying) -- 2  -MW --    Toileting (which includes using toilet bed pan or urinal) -- 2  -MW --    Putting on and taking off regular upper body clothing -- 2  -MW --    Taking care of personal grooming (such as brushing teeth) -- 3  -MW --    Eating meals -- 3  -MW --    AM-PAC 6 Clicks Score (OT) -- 14  -MW --       Functional Assessment    Outcome Measure Options AM-PAC 6 Clicks Basic Mobility (PT)  -SHANNAN -- AM-PAC 6 Clicks Basic Mobility (PT)  -          User Key  (r) = Recorded By, (t) = Taken By, (c) = Cosigned By    Initials Name Provider Type     Charlotte Romo, TAY Physical Therapist Assistant    Mir Tejada, TAY Physical Therapist Assistant    Abeba Harris K, OTR/L Occupational Therapist                 Time Calculation:    PT Charges     Row Name 07/03/22 0949             Time Calculation    Start Time 0854  -WK      Stop Time 0940  -WK      Time Calculation (min) 46 min  -WK      PT Received On 07/03/22  -WK              Time Calculation- PT    Total Timed Code Minutes- PT 46 minute(s)  -WK            User Key  (r) = Recorded By, (t) = Taken By, (c) = Cosigned By    Initials Name Provider Type    WK Geovanna Gonzalez PTA Physical Therapist Assistant              Therapy Charges for Today     Code Description Service Date Service Provider Modifiers Qty    [id number] HC GAIT TRAINING EA 15 MIN 7/3/2022 Geovanna Gonzalez PTA GP 3          PT G-Codes  Outcome Measure Options: AM-PAC 6 Clicks Basic  Mobility (PT)  AM-PAC 6 Clicks Score (PT): 17  AM-PAC 6 Clicks Score (OT): 16    Geovanna Gonzalez, PTA  7/3/2022

## 2022-07-03 NOTE — PROGRESS NOTES
Yas Lunsford MD FACS  Progress Note     LOS: 13 days   Patient Care Team:  Tenzin Sam MD as PCP - General (Family Medicine)      Subjective     Chief complaint:  POD# 13    History of Present Illness:   Complains of a headache.  +flatus  +bm  Denies nausea     The following portions of the patient's history were reviewed and updated as appropriate: allergies, current medications, past family history, past medical history, past social history, past surgical history, and problem list.    Objective       Vital Signs:  Temp:  [97.5 °F (36.4 °C)-98.6 °F (37 °C)] 97.5 °F (36.4 °C)  Heart Rate:  [74-87] 87  Resp:  [16] 16  BP: (133-151)/(68-89) 149/89    Physical Exam:    General appearance - alert, well appearing, and in no distress  Abdomen - soft, appropriately tender, mildly distended, clean      Results Review:    Lab Results (last 24 hours)     Procedure Component Value Units Date/Time    Blood Culture With DENNISE - Blood, Blood, Central Line [[id number]]  (Normal) Collected: 06/27/22 1011    Specimen: Blood, Central Line Updated: 07/02/22 1048     Blood Culture No growth at 5 days    Blood Culture With DENNISE - Blood, Hand, Left [[id number]]  (Normal) Collected: 06/27/22 1001    Specimen: Blood from Hand, Left Updated: 07/02/22 1032     Blood Culture No growth at 5 days        Imaging Results (Last 24 Hours)     ** No results found for the last 24 hours. **            Assessment & Plan     POD# 13 Brian patch    Tylenol for headache  ADAT  Increase activity      Yas Lunsford MD  07/03/22  09:59 CDT

## 2022-07-03 NOTE — PROGRESS NOTES
[facility] Medicine Services  INPATIENT PROGRESS NOTE    Patient Name: Echo Temple  Date of Admission: 6/19/2022  Today's Date: 07/03/22  Length of Stay: 13  Primary Care Physician: Tenzin Sam MD    Subjective   Chief Complaint: Follow-up bleeding ulcer.    HPI:  Patient continues about the same.  No chest pain or shortness of breath.  No vomiting.  No new changes or adverse events overnight.  No fevers.  Vital stable.  Tolerating p.o.    Review of Systems   All pertinent negatives and positives are as above. All other systems have been reviewed and are negative unless otherwise stated.     Objective    Temp:  [97.5 °F (36.4 °C)-98.6 °F (37 °C)] 97.5 °F (36.4 °C)  Heart Rate:  [74-87] 87  Resp:  [16] 16  BP: (133-151)/(68-89) 149/89  Physical Exam  GEN: Awake, alert, interactive, in NAD  HEENT:  PERRLA, EOMI, Anicteric, Trachea midline  Lungs: no wheezing/rales/rhonchi  Heart: RRR, +S1/s2, no rub  ABD: midline epigastric incision w/o drainage, +BS, no guarding/rebound  Extremities: atraumatic, no cyanosis, no edema  Skin: no rashes or petechiae  Neuro: AAOx3, no focal deficits  Psych: normal mood & affect        Results Review:  I have reviewed the labs, radiology results, and diagnostic studies.    Laboratory Data:   Results from last 7 days   Lab Units 07/02/22  0519 06/30/22 0537 06/29/22  0513   WBC 10*3/mm3 14.81* 11.97* 14.88*   HEMOGLOBIN g/dL 8.6* 8.1* 8.3*   HEMATOCRIT % 26.2* 25.7* 26.2*   PLATELETS 10*3/mm3 349 301 279        Results from last 7 days   Lab Units 07/02/22  0519 07/01/22  0514 06/30/22  0537 06/29/22  0513 06/28/22  0513 06/27/22  0457   SODIUM mmol/L 135* 136 135* 133* 133* 135*   POTASSIUM mmol/L 3.9 3.9 3.5 3.5 3.9 4.0   CHLORIDE mmol/L 102 103 101 100 100 98   CO2 mmol/L 26.0 23.0 27.0 25.0 23.0 23.0   BUN mg/dL 21 23 31* 39* 38* 28*   CREATININE mg/dL 0.82 0.68* 0.79 0.77 0.92 0.73*   CALCIUM mg/dL 9.0 8.6 8.4* 8.2* 8.5* 9.0    BILIRUBIN mg/dL  --   --   --  0.3 0.6 0.8   ALK PHOS U/L  --   --   --  125* 147* 133*   ALT (SGPT) U/L  --   --   --  28 38 36   AST (SGOT) U/L  --   --   --  27 43* 68*   GLUCOSE mg/dL 85 92 112* 114* 133* 104*       Culture Data:   No results found for: BLOODCX, URINECX, WOUNDCX, MRSACX, RESPCX, STOOLCX    Radiology Data:   Imaging Results (Last 24 Hours)     ** No results found for the last 24 hours. **          I have reviewed the patient's current medications.     Assessment/Plan     Active Hospital Problems    Diagnosis    • **Bleeding duodenal ulcer    • Sepsis (HCC)    • Diarrhea    • Acute blood loss anemia    • Hypotension        Plan:  #1 sepsis -in the setting of C. difficile colitis.  Do not suspect patient has pneumonia.  Likely some atelectasis.  Incentive spirometer as able.  Patient's had intermittent confusion but improving.  Blood cultures so far negative.  Fever resolved. Heart rate improved.  Blood pressure stable.  IV Flagyl transitioned to p.o. Vanco complete course of care.    #2 c diff colitis-as above patient with 3 bouts of diarrhea 6/27 with fever and signs of sepsis.  C. difficile came back positive.  As above has been on IV Flagyl and position to p.o. Vanco.  Per nursing he is tolerating.    #3 duodenal ulcer/GI bleed -could not be controlled via EGD and he underwent ex lap with oversewing of the ulcer on 6/20. NGT now out.  Continue PPI twice daily.    #4 acute blood loss anemia -from #3 above.  Hemoglobin has been stable.    #5 hypertension -stable on lisinopril    #6 malnutrition -status post TPN and getting clear liquid diet with boost 3 times daily.  Okay for GI soft diet starting 7/4 per surgery.      Discharge Planning: Ongoing.  Plan for acute rehab placement at discharge.  Discussed with case management.  Pre-CERT pending.    Electronically signed by Yoan Mckeon DO, 07/03/22, 10:57 CDT.

## 2022-07-03 NOTE — PLAN OF CARE
Problem: Adult Inpatient Plan of Care  Goal: Plan of Care Review  Outcome: Ongoing, Progressing    Progress: improving  Plan of Care Reviewed With: patient  Outcome Evaluation: Pt amb 20'x 5 no RW min A. Pt was unsteady and as he fatigue pt had increase in forward posture and flexed knees. Pt wanted to continue to amb like this and PTA educated pt on safety. Pt educated that he should continue using RW for safety with nsg and other staff. Pt requested to go to the BR and was incontinent. Pt required assist with clean up.

## 2022-07-03 NOTE — PLAN OF CARE
Goal Outcome Evaluation:  Plan of Care Reviewed With: patient        Progress: no change  Outcome Evaluation: PO abx continue. Midline incision c/d/i. No c/o pain. No BM thus far in shift. Contact spore precautions maintained. VSS. Safety maintained.

## 2022-07-04 LAB
ANION GAP SERPL CALCULATED.3IONS-SCNC: 8 MMOL/L (ref 5–15)
BUN SERPL-MCNC: 17 MG/DL (ref 8–23)
BUN/CREAT SERPL: 20.2 (ref 7–25)
CALCIUM SPEC-SCNC: 9 MG/DL (ref 8.6–10.5)
CHLORIDE SERPL-SCNC: 100 MMOL/L (ref 98–107)
CO2 SERPL-SCNC: 25 MMOL/L (ref 22–29)
CREAT SERPL-MCNC: 0.84 MG/DL (ref 0.76–1.27)
DEPRECATED RDW RBC AUTO: 45.8 FL (ref 37–54)
EGFRCR SERPLBLD CKD-EPI 2021: 87.1 ML/MIN/1.73
ERYTHROCYTE [DISTWIDTH] IN BLOOD BY AUTOMATED COUNT: 14.1 % (ref 12.3–15.4)
GLUCOSE SERPL-MCNC: 95 MG/DL (ref 65–99)
HCT VFR BLD AUTO: 28.6 % (ref 37.5–51)
HGB BLD-MCNC: 9 G/DL (ref 13–17.7)
MAGNESIUM SERPL-MCNC: 1.8 MG/DL (ref 1.6–2.4)
MCH RBC QN AUTO: 28.1 PG (ref 26.6–33)
MCHC RBC AUTO-ENTMCNC: 31.5 G/DL (ref 31.5–35.7)
MCV RBC AUTO: 89.4 FL (ref 79–97)
PHOSPHATE SERPL-MCNC: 3 MG/DL (ref 2.5–4.5)
PLATELET # BLD AUTO: 362 10*3/MM3 (ref 140–450)
PMV BLD AUTO: 9.7 FL (ref 6–12)
POTASSIUM SERPL-SCNC: 4 MMOL/L (ref 3.5–5.2)
RBC # BLD AUTO: 3.2 10*6/MM3 (ref 4.14–5.8)
SODIUM SERPL-SCNC: 133 MMOL/L (ref 136–145)
WBC NRBC COR # BLD: 11.54 10*3/MM3 (ref 3.4–10.8)

## 2022-07-04 PROCEDURE — 80048 BASIC METABOLIC PNL TOTAL CA: CPT | Performed by: SPECIALIST

## 2022-07-04 PROCEDURE — 99024 POSTOP FOLLOW-UP VISIT: CPT | Performed by: SPECIALIST

## 2022-07-04 PROCEDURE — 85027 COMPLETE CBC AUTOMATED: CPT | Performed by: SPECIALIST

## 2022-07-04 PROCEDURE — 97116 GAIT TRAINING THERAPY: CPT

## 2022-07-04 PROCEDURE — 84100 ASSAY OF PHOSPHORUS: CPT | Performed by: INTERNAL MEDICINE

## 2022-07-04 PROCEDURE — 83735 ASSAY OF MAGNESIUM: CPT | Performed by: INTERNAL MEDICINE

## 2022-07-04 RX ADMIN — PANTOPRAZOLE SODIUM 40 MG: 40 TABLET, DELAYED RELEASE ORAL at 06:34

## 2022-07-04 RX ADMIN — PANTOPRAZOLE SODIUM 40 MG: 40 TABLET, DELAYED RELEASE ORAL at 17:32

## 2022-07-04 RX ADMIN — ACETAMINOPHEN 650 MG: 325 TABLET, FILM COATED ORAL at 23:31

## 2022-07-04 RX ADMIN — LISINOPRIL 10 MG: 10 TABLET ORAL at 09:01

## 2022-07-04 RX ADMIN — VANCOMYCIN HYDROCHLORIDE 125 MG: 125 CAPSULE ORAL at 17:32

## 2022-07-04 RX ADMIN — ACETAMINOPHEN 650 MG: 325 TABLET, FILM COATED ORAL at 17:39

## 2022-07-04 RX ADMIN — VANCOMYCIN HYDROCHLORIDE 125 MG: 125 CAPSULE ORAL at 11:31

## 2022-07-04 RX ADMIN — ACETAMINOPHEN 650 MG: 325 TABLET, FILM COATED ORAL at 00:43

## 2022-07-04 RX ADMIN — VANCOMYCIN HYDROCHLORIDE 125 MG: 125 CAPSULE ORAL at 06:34

## 2022-07-04 RX ADMIN — LIDOCAINE 2 PATCH: 50 PATCH CUTANEOUS at 09:01

## 2022-07-04 RX ADMIN — VANCOMYCIN HYDROCHLORIDE 125 MG: 125 CAPSULE ORAL at 00:30

## 2022-07-04 NOTE — PLAN OF CARE
Goal Outcome Evaluation:  Plan of Care Reviewed With: patient        Progress: no change  Outcome Evaluation: Pt c/o headache this morning, did not want any of his PRN tylenol when offered; up with assist; PT walked him down the pete today; new IV access obtained in RFA per protocol; voiding; BM this morning; diet advanced to GI soft; safety maintained.

## 2022-07-04 NOTE — PLAN OF CARE
Goal Outcome Evaluation:  Plan of Care Reviewed With: patient        Progress: improving  Outcome Evaluation: pt sitting EOB, sit-stand cga-sba, pt amb 50 feet at a time rwx cga, bathroom trans cga, trans back to bed sba

## 2022-07-04 NOTE — THERAPY TREATMENT NOTE
Acute Care - Physical Therapy Treatment Note  [facility]     Patient Name: Echo Temple  : 1940  MRN: [id number]  Today's Date: 2022      Visit Dx:     ICD-10-CM ICD-9-CM   1. Hypotension, unspecified hypotension type  I95.9 458.9   2. Impaired mobility  Z74.09 799.89   3. Decreased activities of daily living (ADL)  Z78.9 V49.89   4. Dysphagia, unspecified type  R13.10 787.20     Patient Active Problem List   Diagnosis   • Hypotension   • Bleeding duodenal ulcer   • Acute blood loss anemia   • Sepsis (HCC)   • Diarrhea     Past Medical History:   Diagnosis Date   • Cancer (HCC)    • Hypertension      Past Surgical History:   Procedure Laterality Date   • ENDOSCOPY N/A 2022    Procedure: ESOPHAGOGASTRODUODENOSCOPY WITH ANESTHESIA;  Surgeon: Rene Brice MD;  Location: [facility] ENDOSCOPY;  Service: Gastroenterology;  Laterality: N/A;  pre anemia   post duodenal ulcer   Dr. Sam   • EXPLORATORY LAPAROTOMY N/A 2022    Procedure: LAPAROTOMY EXPLORATORY;  Surgeon: Nancy Park MD;  Location: [facility] OR;  Service: General;  Laterality: N/A;   • HEMORRHOIDECTOMY       PT Assessment (last 12 hours)     PT Evaluation and Treatment     Row Name 22 1359          Physical Therapy Time and Intention    Subjective Information complains of;weakness;fatigue  -     Document Type therapy note (daily note)  -     Mode of Treatment physical therapy  -     Row Name 22 1359          General Information    Existing Precautions/Restrictions fall  -     Row Name 22 1359          Pain    Pretreatment Pain Rating 0/10 - no pain  -     Posttreatment Pain Rating 0/10 - no pain  -     Row Name 22 1359          Bed Mobility    Supine-Sit [place] (Bed Mobility) verbal cues;standby assist  -     Sit-Supine [place] (Bed Mobility) verbal cues;standby assist  -     Row Name 22 1359          Transfers    Sit-Stand [place] (Transfers) standby assist;verbal cues   -     Stand-Sit [place] (Transfers) contact guard;standby assist;verbal cues  -     [place] Level (Toilet Transfer) contact guard;standby assist;verbal cues  -     Row Name 07/04/22 1359          Gait/Stairs (Locomotion)    Assistive Device (Gait) walker, front-wheeled  -     Distance in Feet (Gait) 50 x 10  -     Deviations/Abnormal Patterns (Gait) gait speed decreased  -     Bilateral Gait Deviations forward flexed posture  -     Row Name             Wound 06/20/22 1603 midline abdomen Incision    Wound - Properties Group Placement Date: 06/20/22  -KT Placement Time: 1603  -KT Present on Hospital Admission: N  -KT Orientation: midline  -KT Location: abdomen  -KT Primary Wound Type: Incision  -KT     Retired Wound - Properties Group Placement Date: 06/20/22  -KT Placement Time: 1603  -KT Present on Hospital Admission: N  -KT Orientation: midline  -KT Location: abdomen  -KT Primary Wound Type: Incision  -KT     Retired Wound - Properties Group Date first assessed: 06/20/22  -KT Time first assessed: 1603  -KT Present on Hospital Admission: N  -KT Location: abdomen  -KT Primary Wound Type: Incision  -KT     Row Name 07/04/22 1359          Plan of Care Review    Plan of Care Reviewed With patient  -     Progress improving  -     Outcome Evaluation pt sitting EOB, sit-stand cga-sba, pt amb 50 feet at a time rwx cga, bathroom trans cga, trans back to bed sba  -     Row Name 07/04/22 1359          Positioning and Restraints    Pre-Treatment Position in bed  -     Post Treatment Position bed  -     In Bed fowlers;call light within reach;encouraged to call for assist  -           User Key  (r) = Recorded By, (t) = Taken By, (c) = Cosigned By    Initials Name Provider Type     Charlotte Romo PTA Physical Therapist Assistant    Amandeep Da Silva, RN Registered Nurse                Physical Therapy Education                 Title: PT OT SLP Therapies (Done)     Topic: Physical Therapy  (Done)     Point: Mobility training (Done)     Learning Progress Summary           Patient Acceptance, E, VU by  at 7/1/2022 1444    Comment: safety with mobility.  Progression with POC.  home with HH vs SNF    Acceptance, E,TB, VU by  at 6/28/2022 1411    Comment: bed mobility    Acceptance, E, VU,DU by BRYAN at 6/26/2022 1325    Comment: Pt presents to PT s/p laparotomy exploratory resulting from a bleeding duodenal ulcer. Pt was educated on the benefits of ambulation and moving around to increase independence and functional mobility. Anticipate d.c to SNF.                   Point: Home exercise program (Done)     Learning Progress Summary           Patient Acceptance, E,TB, VU by  at 6/30/2022 1323    Comment: BLE HEP                   Point: Body mechanics (Done)     Learning Progress Summary           Patient Acceptance, E,TB, VU by  at 7/4/2022 1430    Comment: upright posture with gait                   Point: Precautions (Done)     Learning Progress Summary           Patient Acceptance, E,TB, VU by  at 6/29/2022 1023    Comment: call for assist                               User Key     Initials Effective Dates Name Provider Type Discipline     06/16/21 -  Charlotte Romo, PTA Physical Therapist Assistant PT    SHANNAN 12/08/16 -  Mir Coburn, PTA Physical Therapist Assistant PT    BRYAN 05/04/22 -  Lokesh Sanchez, ARTUR Student PT Student PT              PT Recommendation and Plan     Plan of Care Reviewed With: patient  Progress: improving  Outcome Evaluation: pt sitting EOB, sit-stand cga-sba, pt amb 50 feet at a time rwx cga, bathroom trans cga, trans back to bed sba   Outcome Measures     Row Name 07/04/22 1400 07/01/22 1444          How much help from another person do you currently need...    Turning from your back to your side while in flat bed without using bedrails? 4  - 3  -SHANNAN     Moving from lying on back to sitting on the side of a flat bed without bedrails? 4  - 3  -SHANNAN     Moving to and  from a bed to a chair (including a wheelchair)? 4  -AH 3  -SHANNAN     Standing up from a chair using your arms (e.g., wheelchair, bedside chair)? 3  -AH 3  -SHANNAN     Climbing 3-5 steps with a railing? 3  -AH 2  -SHANNAN     To walk in hospital room? 3  -AH 3  -SHANNAN     AM-PAC 6 Clicks Score (PT) 21  -AH 17  -SHANNAN            Functional Assessment    Outcome Measure Options AM-PAC 6 Clicks Basic Mobility (PT)  -AH AM-PAC 6 Clicks Basic Mobility (PT)  -SHANNAN           User Key  (r) = Recorded By, (t) = Taken By, (c) = Cosigned By    Initials Name Provider Type    Charlotte Byrne PTA Physical Therapist Assistant    Mir Tejada PTA Physical Therapist Assistant                 Time Calculation:    PT Charges     Row Name 07/04/22 1431             Time Calculation    Start Time 1359  -      Stop Time 1425  -      Time Calculation (min) 26 min  -      PT Received On 07/04/22  -              Time Calculation- PT    Total Timed Code Minutes- PT 26 minute(s)  -              Timed Charges    41820 - Gait Training Minutes  26  -AH              Total Minutes    Timed Charges Total Minutes 26  -       Total Minutes 26  -AH            User Key  (r) = Recorded By, (t) = Taken By, (c) = Cosigned By    Initials Name Provider Type     Charlotte Romo PTA Physical Therapist Assistant              Therapy Charges for Today     Code Description Service Date Service Provider Modifiers Qty    [id number] HC GAIT TRAINING EA 15 MIN 7/4/2022 Charlotte Romo PTA GP 2          PT G-Codes  Outcome Measure Options: AM-PAC 6 Clicks Basic Mobility (PT)  AM-PAC 6 Clicks Score (PT): 21  AM-PAC 6 Clicks Score (OT): 16    Charlotte Romo PTA  7/4/2022

## 2022-07-04 NOTE — PLAN OF CARE
Goal Outcome Evaluation:  Plan of Care Reviewed With: patient        Progress: no change  Outcome Evaluation: Pt A&O x4. PRN tylenol given for c/o headache. PO abx continue. Voiding per urinal. Tolerating clear liquid diet. Start GI soft diet today. VSS. Safety maintainedd

## 2022-07-04 NOTE — PROGRESS NOTES
Yas Lunsford MD FACS  Progress Note     LOS: 14 days   Patient Care Team:  Tenzin Sam MD as PCP - General (Family Medicine)      Subjective     Chief complaint: S/p андрей patch    History of Present Illness:   +bm  Pain controlled  Denies nausea.  Poor appetite     The following portions of the patient's history were reviewed and updated as appropriate: allergies, current medications, past family history, past medical history, past social history, past surgical history, and problem list.    Objective       Vital Signs:  Temp:  [97.2 °F (36.2 °C)-98.4 °F (36.9 °C)] 98 °F (36.7 °C)  Heart Rate:  [75-80] 79  Resp:  [16-18] 16  BP: (128-148)/(72-84) 141/72    Physical Exam:    General appearance - alert, well appearing, and in no distress  Abdomen - soft, appropriately tender, clean      Results Review:    Lab Results (last 24 hours)     Procedure Component Value Units Date/Time    Basic Metabolic Panel [[id number]]  (Abnormal) Collected: 07/04/22 0607    Specimen: Blood Updated: 07/04/22 0642     Glucose 95 mg/dL      BUN 17 mg/dL      Creatinine 0.84 mg/dL      Sodium 133 mmol/L      Potassium 4.0 mmol/L      Chloride 100 mmol/L      CO2 25.0 mmol/L      Calcium 9.0 mg/dL      BUN/Creatinine Ratio 20.2     Anion Gap 8.0 mmol/L      eGFR 87.1 mL/min/1.73      Comment: National Kidney Foundation and American Society of Nephrology (ASN) Task Force recommended calculation based on the Chronic Kidney Disease Epidemiology Collaboration (CKD-EPI) equation refit without adjustment for race.       Narrative:      GFR Normal >60  Chronic Kidney Disease <60  Kidney Failure <15      Phosphorus [[id number]]  (Normal) Collected: 07/04/22 0607    Specimen: Blood Updated: 07/04/22 0642     Phosphorus 3.0 mg/dL     Magnesium [[id number]]  (Normal) Collected: 07/04/22 0607    Specimen: Blood Updated: 07/04/22 0642     Magnesium 1.8 mg/dL     CBC (No Diff) [[id number]]  (Abnormal) Collected: 07/04/22 0607    Specimen:  Blood Updated: 07/04/22 0625     WBC 11.54 10*3/mm3      RBC 3.20 10*6/mm3      Hemoglobin 9.0 g/dL      Hematocrit 28.6 %      MCV 89.4 fL      MCH 28.1 pg      MCHC 31.5 g/dL      RDW 14.1 %      RDW-SD 45.8 fl      MPV 9.7 fL      Platelets 362 10*3/mm3         Imaging Results (Last 24 Hours)     ** No results found for the last 24 hours. **            Assessment & Plan     S/p repair bleeding duodenal ulcer, c.diff    Encourage diet  Continue abx      Yas Lunsford MD  07/04/22  11:47 CDT

## 2022-07-04 NOTE — PROGRESS NOTES
[facility] Medicine Services  INPATIENT PROGRESS NOTE    Patient Name: Echo Temple  Date of Admission: 6/19/2022  Today's Date: 07/04/22  Length of Stay: 14  Primary Care Physician: Tenzin Sam MD    Subjective   Chief Complaint: Feels like a word that corresponds to a human deposition  HPI   82 year old male status post duodenal ulcer repair. Doing well.     Review of Systems   All pertinent negatives and positives are as above. All other systems have been reviewed and are negative unless otherwise stated.     Objective    Temp:  [97.2 °F (36.2 °C)-98.4 °F (36.9 °C)] 98 °F (36.7 °C)  Heart Rate:  [75-80] 79  Resp:  [16-18] 16  BP: (128-148)/(72-84) 141/72  Physical Exam  Constitutional:       Appearance: He is well-developed. He is not ill-appearing or diaphoretic.   HENT:      Head: Normocephalic and atraumatic.      Right Ear: External ear normal.      Left Ear: External ear normal.      Nose: Nose normal.      Mouth/Throat:      Mouth: Mucous membranes are dry.   Eyes:      General:         Right eye: No discharge.         Left eye: No discharge.      Extraocular Movements: Extraocular movements intact.      Conjunctiva/sclera: Conjunctivae normal.      Pupils: Pupils are equal, round, and reactive to light.   Neck:      Vascular: No JVD.   Cardiovascular:      Rate and Rhythm: Regular rhythm. Tachycardia present.      Heart sounds: Normal heart sounds. No murmur heard.  Pulmonary:      Effort: Pulmonary effort is normal. No respiratory distress.      Breath sounds: Normal breath sounds. No wheezing or rales.   Chest:      Chest wall: No tenderness.   Abdominal:      General: Bowel sounds are normal. There is no distension.      Palpations: Abdomen is soft.      Tenderness: There is no abdominal tenderness. There is no guarding or rebound.   Musculoskeletal:         General: No tenderness or deformity. Normal range of motion.      Cervical back: Normal range of  motion and neck supple. No rigidity.      Right lower leg: No edema.      Left lower leg: No edema.   Skin:     General: Skin is warm and dry.      Findings: No rash.   Neurological:      General: No focal deficit present.      Mental Status: He is alert and oriented to person, place, and time. Mental status is at baseline.      Cranial Nerves: No cranial nerve deficit.      Sensory: No sensory deficit.      Motor: No abnormal muscle tone.      Deep Tendon Reflexes: Reflexes normal.   Psychiatric:         Mood and Affect: Mood normal.         Behavior: Behavior normal.     Results Review:  I have reviewed the labs, radiology results, and diagnostic studies.    Laboratory Data:   Results from last 7 days   Lab Units 07/04/22  0607 07/02/22  0519 06/30/22  0537   WBC 10*3/mm3 11.54* 14.81* 11.97*   HEMOGLOBIN g/dL 9.0* 8.6* 8.1*   HEMATOCRIT % 28.6* 26.2* 25.7*   PLATELETS 10*3/mm3 362 349 301        Results from last 7 days   Lab Units 07/04/22  0607 07/02/22  0519 07/01/22  0514 06/30/22  0537 06/29/22  0513 06/28/22  0513   SODIUM mmol/L 133* 135* 136   < > 133* 133*   POTASSIUM mmol/L 4.0 3.9 3.9   < > 3.5 3.9   CHLORIDE mmol/L 100 102 103   < > 100 100   CO2 mmol/L 25.0 26.0 23.0   < > 25.0 23.0   BUN mg/dL 17 21 23   < > 39* 38*   CREATININE mg/dL 0.84 0.82 0.68*   < > 0.77 0.92   CALCIUM mg/dL 9.0 9.0 8.6   < > 8.2* 8.5*   BILIRUBIN mg/dL  --   --   --   --  0.3 0.6   ALK PHOS U/L  --   --   --   --  125* 147*   ALT (SGPT) U/L  --   --   --   --  28 38   AST (SGOT) U/L  --   --   --   --  27 43*   GLUCOSE mg/dL 95 85 92   < > 114* 133*    < > = values in this interval not displayed.       Culture Data:   No results found for: BLOODCX, URINECX, WOUNDCX, MRSACX, RESPCX, STOOLCX    Radiology Data:   Imaging Results (Last 24 Hours)     ** No results found for the last 24 hours. **          I have reviewed the patient's current medications.     Assessment/Plan     Active Hospital Problems    Diagnosis    •  **Bleeding duodenal ulcer    • Sepsis (HCC)    • Diarrhea    • Acute blood loss anemia    • Hypotension        Plan:  Increase activity  Advance diet as tolerated  Probably home in 1-2 days.  Labs in AM    Discharge Planning: I expect the patient to be discharged to home in 1-2 days.    Electronically signed by Randy Kirk MD, 07/04/22, 11:33 CDT.

## 2022-07-05 PROBLEM — A41.9 SEPSIS (HCC): Status: RESOLVED | Noted: 2022-06-27 | Resolved: 2022-07-05

## 2022-07-05 PROBLEM — K26.4 BLEEDING DUODENAL ULCER: Status: RESOLVED | Noted: 2022-06-20 | Resolved: 2022-07-05

## 2022-07-05 PROBLEM — I95.9 HYPOTENSION: Status: RESOLVED | Noted: 2022-06-20 | Resolved: 2022-07-05

## 2022-07-05 LAB
ANION GAP SERPL CALCULATED.3IONS-SCNC: 12 MMOL/L (ref 5–15)
BUN SERPL-MCNC: 16 MG/DL (ref 8–23)
BUN/CREAT SERPL: 17.4 (ref 7–25)
CALCIUM SPEC-SCNC: 8.8 MG/DL (ref 8.6–10.5)
CHLORIDE SERPL-SCNC: 98 MMOL/L (ref 98–107)
CO2 SERPL-SCNC: 23 MMOL/L (ref 22–29)
CREAT SERPL-MCNC: 0.92 MG/DL (ref 0.76–1.27)
DEPRECATED RDW RBC AUTO: 45 FL (ref 37–54)
EGFRCR SERPLBLD CKD-EPI 2021: 83.1 ML/MIN/1.73
ERYTHROCYTE [DISTWIDTH] IN BLOOD BY AUTOMATED COUNT: 14.2 % (ref 12.3–15.4)
GLUCOSE SERPL-MCNC: 86 MG/DL (ref 65–99)
HCT VFR BLD AUTO: 29.5 % (ref 37.5–51)
HGB BLD-MCNC: 9.7 G/DL (ref 13–17.7)
MCH RBC QN AUTO: 29 PG (ref 26.6–33)
MCHC RBC AUTO-ENTMCNC: 32.9 G/DL (ref 31.5–35.7)
MCV RBC AUTO: 88.1 FL (ref 79–97)
PLATELET # BLD AUTO: 336 10*3/MM3 (ref 140–450)
PMV BLD AUTO: 10.6 FL (ref 6–12)
POTASSIUM SERPL-SCNC: 4.3 MMOL/L (ref 3.5–5.2)
RBC # BLD AUTO: 3.35 10*6/MM3 (ref 4.14–5.8)
SODIUM SERPL-SCNC: 133 MMOL/L (ref 136–145)
WBC NRBC COR # BLD: 9.93 10*3/MM3 (ref 3.4–10.8)

## 2022-07-05 PROCEDURE — 97535 SELF CARE MNGMENT TRAINING: CPT | Performed by: OCCUPATIONAL THERAPIST

## 2022-07-05 PROCEDURE — 97116 GAIT TRAINING THERAPY: CPT

## 2022-07-05 PROCEDURE — 80048 BASIC METABOLIC PNL TOTAL CA: CPT | Performed by: SPECIALIST

## 2022-07-05 PROCEDURE — 85027 COMPLETE CBC AUTOMATED: CPT | Performed by: SPECIALIST

## 2022-07-05 RX ORDER — ACETAMINOPHEN 325 MG/1
650 TABLET ORAL EVERY 6 HOURS PRN
Qty: 120 TABLET | Refills: 0 | Status: SHIPPED | OUTPATIENT
Start: 2022-07-05 | End: 2022-08-04

## 2022-07-05 RX ORDER — METRONIDAZOLE 250 MG/1
500 TABLET ORAL 3 TIMES DAILY
Qty: 28 TABLET | Refills: 0 | Status: SHIPPED | OUTPATIENT
Start: 2022-07-05 | End: 2022-07-12

## 2022-07-05 RX ORDER — SACCHAROMYCES BOULARDII 250 MG
250 CAPSULE ORAL 2 TIMES DAILY
Qty: 28 CAPSULE | Refills: 0 | Status: SHIPPED | OUTPATIENT
Start: 2022-07-05 | End: 2022-07-19

## 2022-07-05 RX ORDER — PANTOPRAZOLE SODIUM 40 MG/1
40 TABLET, DELAYED RELEASE ORAL
Qty: 60 TABLET | Refills: 0 | Status: SHIPPED | OUTPATIENT
Start: 2022-07-05 | End: 2022-08-04

## 2022-07-05 RX ORDER — VANCOMYCIN HYDROCHLORIDE 125 MG/1
125 CAPSULE ORAL EVERY 6 HOURS SCHEDULED
Qty: 28 CAPSULE | Refills: 0 | Status: SHIPPED | OUTPATIENT
Start: 2022-07-05 | End: 2022-07-12

## 2022-07-05 RX ADMIN — VANCOMYCIN HYDROCHLORIDE 125 MG: 125 CAPSULE ORAL at 00:33

## 2022-07-05 RX ADMIN — LISINOPRIL 10 MG: 10 TABLET ORAL at 08:40

## 2022-07-05 RX ADMIN — VANCOMYCIN HYDROCHLORIDE 125 MG: 125 CAPSULE ORAL at 18:00

## 2022-07-05 RX ADMIN — VANCOMYCIN HYDROCHLORIDE 125 MG: 125 CAPSULE ORAL at 05:48

## 2022-07-05 RX ADMIN — PANTOPRAZOLE SODIUM 40 MG: 40 TABLET, DELAYED RELEASE ORAL at 18:00

## 2022-07-05 RX ADMIN — PANTOPRAZOLE SODIUM 40 MG: 40 TABLET, DELAYED RELEASE ORAL at 08:40

## 2022-07-05 RX ADMIN — VANCOMYCIN HYDROCHLORIDE 125 MG: 125 CAPSULE ORAL at 13:29

## 2022-07-05 NOTE — PLAN OF CARE
Goal Outcome Evaluation:  Plan of Care Reviewed With: patient        Progress: no change  Outcome Evaluation: Pt denies pain; up ad brock; voiding; IID; pending insurance approval for SNF; safety maintained.

## 2022-07-05 NOTE — PLAN OF CARE
Goal Outcome Evaluation:  Plan of Care Reviewed With: patient        Progress: improving  Outcome Evaluation: Ntn follow up. Pt is no longer on TPN. Diet has been advanced to GI soft. Appetite is improving, he has consumed avg of 45% of the last 5 meals. He will drink some of the Boost supplements. Will cont to follow for nutrition needs.

## 2022-07-05 NOTE — CASE MANAGEMENT/SOCIAL WORK
Continued Stay Note   [facility]     Patient Name: Echo Temple  MRN: [id number]  Today's Date: 7/5/2022    Admit Date: 6/19/2022     Discharge Plan     Row Name 07/05/22 1430       Plan    Plan [facility] pending precert    Patient/Family in Agreement with Plan yes    Plan Comments [facility] is still waiting for insurance approval. Will d/c to [facility] once approved.               Discharge Codes    No documentation.               Expected Discharge Date and Time     Expected Discharge Date Expected Discharge Time    Jul 6, 2022             SANJANA Marinelli

## 2022-07-05 NOTE — DISCHARGE SUMMARY
[facility] Medicine Services  DISCHARGE SUMMARY       Date of Admission: 6/19/2022  Date of Discharge:  7/5/2022  Primary Care Physician: Tenzin Sam MD    Presenting Problem/Chief Complaint:  Hypotension    Final Discharge Diagnoses:  Hypovolemic shock present on admission due to acute blood loss anemia due to bleeding duodenal ulcer status post surgical repair  Sepsis due to clostridium difficile, improved  Clostridium difficile colitis and diarrhea, improving    Consults: Dr Nancy Jamison general surgery, Dr Rene Brice GI, Dr Jomar Gonzalez pulmonology    Procedures Performed:   6/20/2022 EGD   - Normal gastroesophageal junction and esophagus.  - Mild, benign-appearing esophageal stenosis.  - A small, sliding hiatal hernia was intermittently present during the exam.  - Moderate erosive gastritis in the prepyloric antrum.  - Giant duodenal ulcer at apex of duodenal bulb with arterial bleeding. Multiple modalities were  attempted (bipolar cautery, epinephrine injection, epi spray of ulcer bed, and placement of 2 large clips)  but were unsuccessful. Emergency consultation with general surgeon (Dr. Nancy Park) was  obtained intraoperatively, and he was taken to the OR after the procedure was terminated..  - Normal second portion of the duodenum and third portion of the duodenum.  - The examined portions of the nasopharynx, oropharynx and larynx were normal.  - The examination was otherwise normal.  - No specimens collected.    6/20/2022 (1) Exploratory Laparotomy   (2) Duodenotomy (60874) for exploration   (3) Gastrotomy (22645) with suture repair of bleeding ulcer   (4) Closure of duodenotomy and Gastrostomy     Pertinent Test Results:   Imaging Results (All)     Procedure Component Value Units Date/Time    CT Chest With & Without Contrast Diagnostic [[id number]] Collected: 06/27/22 1234     Updated: 06/27/22 1251    Narrative:      EXAMINATION: CT CHEST W WO  CONTRAST DIAGNOSTIC-      6/27/2022 10:32 AM CDT     HISTORY: Pneumonia, complication suspected, xray done;  I95.9-Hypotension, unspecified; Z74.09-Other reduced mobility;  Z78.9-Other specified health status     In order to have a CT radiation dose as low as reasonably achievable  Automated Exposure Control was utilized for adjustment of the mA and/or  KV according to patient size.     DLP in mGycm= 403     The CT scan of the chest is performed in before and after intravenous  contrast enhancement.     Images are acquired in axial plane and subsequent reconstruction in  coronal and sagittal planes.     Comparison is made with the previous study dated 6/20/2022.     The lungs are poorly expanded.     There are atelectatic changes in the right lower lung and a small  bibasal pleural effusion which was not seen in the previous study.     Previously seen 3 mm nodule in the left lower lobe, image #108 in series  3 is stable.     There is a small pneumatocele in the right posterior costophrenic sulcus  laterally.     The tracheobronchial structures are normal and patent. No endobronchial  nodules. A small diverticulum from the medial aspect of the right  mainstem bronchus is similar to the previous study.     There are groundglass opacity/infiltrate involving the left upper and  left lower lobe which was not seen in the previous study.     A heterogeneous low-density nonenhancing mass in the right lobe of the  thyroid gland is reidentified. It measures 3.2 cm in AP dimension.     There is no axillary lymphadenopathy.     Atheromatous changes thoracic aorta is seen. A mild ectasia of the  ascending thoracic aorta persistent dysplasias 4.3 cm. No dissection.  Atheromatous changes in the coronary arteries are similar to the  previous study.     Normal opacification of pulmonary arteries and branches. No filling  defects.     There is no evidence of mediastinal or hilar mass or lymphadenopathy.     Nasogastric tube is in  place.     The abdomen is separately reviewed and reported.     The images reviewed in bone window show chronic degenerative changes of  the thoracic spine. No focal bony abnormality or a bone lesion.       Impression:      1. Groundglass opacity/infiltrate in the left lung represent an acute  inflammatory/infectious process.  2. Persistent moderate ectasia of the ascending thoracic aorta. No  dissection.  3. Right thyroid nodule is stable.  4. Small bibasal pleural effusion and lower lobar atelectatic changes.                             This report was finalized on 06/27/2022 12:48 by Dr. Byron Delgadillo MD.    CT Abdomen Pelvis With & Without Contrast [[id number]] Collected: 06/27/22 1140     Updated: 06/27/22 1206    Narrative:      EXAMINATION: CT ABDOMEN PELVIS W WO CONTRAST-      6/27/2022 10:32 AM CDT     HISTORY: Abdominal pain, post-op; I95.9-Hypotension, unspecified;  Z74.09-Other reduced mobility; Z78.9-Other specified health status     In order to have a CT radiation dose as low as reasonably achievable  Automated Exposure Control was utilized for adjustment of the mA and/or  KV according to patient size.     DLP in mGycm= 703     The CT scan of the abdomen and pelvis is performed before and after  intravenous contrast and is patent.     The Images are acquired in axial plane and subsequent reconstruction in  coronal and sagittal planes.     Comparison is made with the previous study dated 6/20/2022.     The lung bases included in the study show mild atelectatic changes and a  small bibasal pleural effusion. Atheromatous changes of coronary  arteries are noted.     Unenhanced liver is unremarkable except for low-density nodule in the  left lobe similar to the previous study. The spleen is normal.     Moderately distended gallbladder is seen with a layering high-density  fluid is may represent sludge. No calculi.     Pancreas is normal.     The adrenal glands bilaterally are normal.     Moderate  lobulation and renal contour bilaterally. Low-density nodules  in both kidneys are reidentified. The largest nodule in the upper pole  of the left kidney measures 2.3 cm. The CT density suggests a cyst.  Several tiny nodule the kidneys bilaterally are too small to be further  characterized. No calculi. No hydronephrosis. The ureters are poorly  visualized. No intrinsic abnormality. Small bowel amount of air is seen  in the nondependent part of the urinary bladder. The urinary bladder is  well distended. No calculi.     There is a nasogastric tube in place. The stomach is decompressed  compared with the previous study. There is moderate irregular thickening  of the gastric antrum and duodenal bulb which was not obvious in the  previous study. There is a small diverticulum from the proximal  descending duodenum projectile     Head of the pancreas. There is no evidence of contrast extravasation.  There is moderate retroperitoneal fat infiltration around the descending  duodenum. The small bowel is normal. The appendix are normal. Moderate  gas and stool is seen in the colon. There are filling defects in the  partially opacified cecum and ascending colon which probably represent  fecal material. However obscured and adjacent nonobstructing  nodule/mass. There is diverticulosis of the distal colon. There is  moderate diffuse thickening of the wall of the descending and sigmoid  colon. There is a mild surrounding peritumoral infiltration.     There is a right mid abdominal approach draining catheter is distal end  in adjacent and below the right lobe of the liver.     Atheromatous changes of the abdominal aorta and iliac arteries. No  aneurysmal dilatation.     There is no evidence of dominant or pelvic lymphadenopathy.     There is a mild peritumoral infiltration without significant nodules or  masses. This may be due to the recent laparoscopy. No free fluid or free  air.     Images reviewed in bone window show no acute  bony abnormality or a bone  lesion. Chronic degenerative changes of the lumbar spine are seen with a  mild dextroscoliosis.       Impression:      1. A nasogastric tube in place. Decompression of the stomach since the  previous study. A drainage tube in place.  2. Moderate thickening of the wall/mucosa of the gastric antrum and  duodenal bulb the may represent an inflammatory process as suggested in  the history. No leakage of the contrast material in the area.  3. Thickening of the wall of the descending and sigmoid colon with  surrounding peritumoral infiltration may represent acute colitis. This  could partly be due to incomplete distention.  4. The diverticulosis of the distal colon. No evidence for  diverticulitis.  5. Atelectatic changes and a small bibasal pleural effusion.  6. Small air in the origin of the plantar may be due to recent  instrumentation or inflammatory/infectious process.  7. Low density nodule the liver and spleen are stable and probably  represent cysts.                                   This report was finalized on 06/27/2022 12:03 by Dr. Byron Delgadillo MD.    XR Chest 1 View [[id number]] Collected: 06/27/22 0711     Updated: 06/27/22 0716    Narrative:      EXAM/TECHNIQUE: XR CHEST 1 VW-     INDICATION: shortness of air; I95.9-Hypotension, unspecified;  Z74.09-Other reduced mobility; Z78.9-Other specified health status     COMPARISON: 6/23/2022     FINDINGS:     Interval extubation. RIGHT IJ CVL tip overlies the SVC. Enteric tube tip  projects over the GE junction. Cardiac silhouette is within normal  limits. Bibasilar opacities have significantly decreased. No new  airspace opacity. No pleural effusion or visible pneumothorax. [place]  project over the RIGHT upper abdomen.       Impression:         1.  Interval extubation. CVL appears in good position. Enteric tube tip  projects over the GE junction, recommend advancement.  2.  Interval decrease in bibasilar opacities.  This report was  finalized on 06/27/2022 07:13 by Dr. Vance Segundo MD.    XR Chest 1 View [[id number]] Collected: 06/23/22 0726     Updated: 06/23/22 0730    Narrative:      EXAM/TECHNIQUE: XR CHEST 1 VW-     INDICATION: Ventilated patient, hypotension     COMPARISON: 6/21/2022     FINDINGS:     Endotracheal tube is 4.8 cm above the vladimir. Enteric tube terminates  below the diaphragm out of the field of view. RIGHT IJ CVL tip overlies  the cavoatrial junction.     Cardiac silhouette is stable. No change in RIGHT infrahilar and LEFT  lower lobe airspace opacities. No large pleural effusion. No visible  pneumothorax.       Impression:         No change in appearance of the chest.  This report was finalized on 06/23/2022 07:27 by Dr. Vance Segundo MD.    XR Chest 1 View [[id number]] Collected: 06/21/22 0725     Updated: 06/21/22 0731    Narrative:      EXAM/TECHNIQUE: XR CHEST 1 VW-     INDICATION: ETT in place; I95.9-Hypotension, unspecified     COMPARISON: 6/20/2022     FINDINGS:     Interval intubation with endotracheal tube 4 cm above the vladimir.  RIGHT IJ CVL with tip overlying the cavoatrial junction.  Enteric tube terminates below the diaphragm out of the field of view.     Cardiac silhouette is borderline prominent but stable allowing for  differences in lung volumes and positioning. Mild central vascular  congestion is suspected. No pleural effusion or pneumothorax. New mild  LEFT basilar opacity, likely atelectasis. No acute osseous finding.       Impression:         Support lines/tubes appear in good position. Suspect mild LEFT basilar  atelectasis.  This report was finalized on 06/21/2022 07:28 by Dr. Vance Segundo MD.    CT Angiogram Abdomen Pelvis [[id number]] Collected: 06/20/22 0823     Updated: 06/20/22 0840    Narrative:      EXAMINATION: CT ANGIOGRAM ABDOMEN PELVIS-      6/20/2022 1:37 AM CDT     HISTORY: Aortic aneurysm (AAA), surveillance     In order to have a CT radiation dose as low as reasonably  achievable  Automated Exposure Control was utilized for adjustment of the mA and/or  KV according to patient size.     DLP in mGycm= 422     CT angiography of the abdomen and pelvis are performed after intravenous  contrast and is patent. Images are acquired in axial plane and  subsequent 2-D reconstructions coronal and sagittal planes and 3-D  maximum intensity projection reconstruction.     There is no previous study for comparison.     Atheromatous changes of the abdominal aorta and iliac arteries are seen.  No aneurysmal dilatation or dissection.     There is a mild narrowing of the origin of the celiac trunk with  moderate poststenotic dilatation. The maximal lumen measures 1.1 cm.  There is subsequent normal branches.     There is long segment noncalcific plaque along the inferior margin of  the proximal superior mesenteric artery. No significant stenosis. Normal  branches.     Calcific plaques are seen at the origin of the left renal artery. No  stenosis. Normal origin course and caliber of the right renal artery  seen.     A calcific plaque is seen at the origin of the inferior mesenteric  artery. 50% diameter stenosis. The remaining inferior mesenteric artery  appears unremarkable with subsequent normal branches.     Atheromatous changes of both common internal and external iliac arteries  is seen. No significant stenosis or aneurysmal dilatation.     Normal sized common femoral arteries are seen dividing into normal  superficial and deep femoral arteries.     The lung bases included in the study appears unremarkable. There is a  small subpleural nodule within left lower lobe, image #4 in series 4  which may represent a small granuloma?.     There are several well-defined low-density nodules in the liver which  are suboptimally and incompletely evaluated in this study. These may  represent hepatic cysts. Spleen is normal.     Pancreas appears normal.     The adrenal glands bilaterally are normal.      Moderate lobulation and renal contour bilaterally seen. There are  several well-defined sharply marginated low density nodules in both  kidneys which are incompletely evaluated in this study. The largest  nodule located posteriorly in the upper pole of the left kidney measures  1.9 cm. CT density suggests a cyst. No radiopaque calculi in either  kidney. No hydronephrosis. Limited visualized ureters are unremarkable.  Urinary bladder is poorly distended. No intrinsic abnormalities.     Prostate is moderately enlarged with intrinsic calcification. There are  small metallic densities in the prostate which may represent  radiotherapy seeds     The stomach is significantly distended with ingested material fluid and  gas. The duodenum and small bowel are normal. Normal retrocecal appendix  is seen. Large volume stool is seen in the colon. No findings to suggest  obstruction. There is diverticulosis of the colon more pronounced in the  sigmoid colon. No findings to suggest diverticulitis.     There is no evidence of abdominal or pelvic lymphadenopathy.     Images are reviewed in bone window show chronic degenerative changes of  the lumbar spine. Severe degeneration of intervertebral disc at L2-3,  L3-4 and L5-S1. There is a mild dextroscoliosis. No focal bony lesions  noted.       Impression:      1. Atheromatous changes of the abdominal aorta iliac and femoral  arteries. No aneurysmal dilatation or dissection. No significant  stenosis.  2. Several low density nodule in the liver and both kidneys which are  incompletely evaluated in this study. These probably represent cysts.  Further follow-up may be obtained.  3. Diverticulosis of the distal colon. No evidence of acute  diverticulitis.     The above study was initially reviewed and reported by [facility]. I do not  find any discrepancies.  This report was finalized on 06/20/2022 08:37 by Dr. Byron Delgadillo MD.    CT Angiogram Chest [[id number]] Collected: 06/20/22 0818      Updated: 06/20/22 0828    Narrative:      EXAMINATION: CT ANGIOGRAM CHEST- 6/20/2022 8:18 AM CDT     HISTORY: Acute aortic syndrome (AAS) suspected, aortic aneurysm  surveillance.     DOSE: 422 mGycm (Automatic exposure control technique was implemented in  an effort to keep the radiation dose as low as possible without  compromising image quality)     REPORT: Spiral CT of the chest was performed after administration of  intravenous contrast from the thoracic inlet through the upper abdomen  using CTA protocol. Reconstructed 3-D, coronal and sagittal images were  also reviewed.     Comparison: There are no correlative imaging studies for comparison.     The contrast bolus is satisfactory. The central pulmonary arteries are  normal caliber and no filling defects are seen in the pulmonary  arteries. Heart size is normal. No evidence of right heart strain is  identified. There is ectasia of the ascending aorta, with a maximum  diameter 3.8 cm and no evidence of dissection. A small volume of  calcified plaque is noted within the aortic arch. At the arch level, the  aorta has a maximum diameter 2.7 cm in the descending thoracic aorta has  a maximum diameter 2.8 cm. There is a low-attenuation mass in the right  thyroid lobe measuring 3.4 cm. Follow-up ultrasound is recommended. No  intrathoracic lymphadenopathy is identified. There is no pneumothorax.  Review of lung windows demonstrates a few scattered calcified granulomas  and there is mild bibasilar atelectasis. No evidence of pneumonia is  identified. There is a 3 mm noncalcified nodule in the left lower lobe  image 117 series 4, most likely benign. There are several small cysts  within the liver, largest is in the left lobe measures 1.7 cm, axial  image 150. A large amount ingested food stuff is noted in the stomach.  There are benign-appearing cysts at the upper pole the left kidney. This  includes a cyst that measures 1.9 cm. Review of bone windows shows no  acute  abnormality, moderate degenerative changes are noted in the  midthoracic and upper lumbar spine.       Impression:      1. No acute intrathoracic abnormality is identified. No evidence of  pulmonary embolism or aortic dissection. There is ectasia of the  ascending aorta, with a maximum diameter of 3.8 cm. No aortic aneurysm  is identified. Small volume of calcified plaque within the thoracic  aortic arch and the LAD coronary artery distribution.  2. Probably benign 3 mm noncalcified pulmonary nodules in the left lower  lobe. There is evidence of healed granulomatous disease.  3. Follow-up ultrasound of the thyroid gland is recommended to evaluate  the 3.4 cm low-attenuation mass in the right thyroid lobe.  4. Hepatic cysts and renal cysts at the upper pole of the left kidney.     A preliminary report was provided by the [facility] radiology service.                 This report was finalized on 06/20/2022 08:25 by Dr. Edmund Escudero MD.    XR Chest 1 View [[id number]] Collected: 06/20/22 0708     Updated: 06/20/22 0714    Narrative:      EXAM/TECHNIQUE: XR CHEST 1 VW-     INDICATION: weakness     COMPARISON: 6/14/2022     FINDINGS:     Cardiomediastinal silhouette is within normal limits. No pleural  effusion or pneumothorax. No focal consolidation. No acute osseous  findings.       Impression:         No acute findings.   This report was finalized on 06/20/2022 07:11 by Dr. Vance Segundo MD.          LAB RESULTS:      Lab 07/05/22  0552 07/04/22  0607 07/02/22  0519 06/30/22  0537 06/29/22  0513   WBC 9.93 11.54* 14.81* 11.97* 14.88*   HEMOGLOBIN 9.7* 9.0* 8.6* 8.1* 8.3*   HEMATOCRIT 29.5* 28.6* 26.2* 25.7* 26.2*   PLATELETS 336 362 349 301 279   NEUTROS ABS  --   --   --  7.87* 10.88*   IMMATURE GRANS (ABS)  --   --   --  0.47* 0.43*   LYMPHS ABS  --   --   --  2.05 1.78   MONOS ABS  --   --   --  0.83 0.91*   EOS ABS  --   --   --  0.71* 0.82*   MCV 88.1 89.4 88.8 92.1 91.3         Lab 07/05/22  0552 07/04/22  0607  07/02/22  0519 07/01/22  0514 06/30/22  0537 06/29/22  0513   SODIUM 133* 133* 135* 136 135* 133*   POTASSIUM 4.3 4.0 3.9 3.9 3.5 3.5   CHLORIDE 98 100 102 103 101 100   CO2 23.0 25.0 26.0 23.0 27.0 25.0   ANION GAP 12.0 8.0 7.0 10.0 7.0 8.0   BUN 16 17 21 23 31* 39*   CREATININE 0.92 0.84 0.82 0.68* 0.79 0.77   EGFR 83.1 87.1 87.7 92.8 88.7 89.4   GLUCOSE 86 95 85 92 112* 114*   CALCIUM 8.8 9.0 9.0 8.6 8.4* 8.2*   MAGNESIUM  --  1.8 1.8 1.9 1.9 1.9   PHOSPHORUS  --  3.0 2.8 2.6 2.2*  --          Lab 06/29/22  0513   TOTAL PROTEIN 5.2*   ALBUMIN 2.80*   GLOBULIN 2.4   ALT (SGPT) 28   AST (SGOT) 27   BILIRUBIN 0.3   ALK PHOS 125*                     Brief Urine Lab Results  (Last result in the past 365 days)      Color   Clarity   Blood   Leuk Est   Nitrite   Protein   CREAT   Urine HCG        06/27/22 0523 Dark Yellow   Clear   Trace   Negative   Negative   Trace               Microbiology Results (last 10 days)     Procedure Component Value - Date/Time    Clostridium Difficile Toxin - Stool, Per Rectum [[id number]]  (Abnormal) Collected: 06/27/22 1639    Lab Status: Final result Specimen: Stool from Per Rectum Updated: 06/27/22 1850    Narrative:      The following orders were created for panel order Clostridium Difficile Toxin - Stool, Per Rectum.  Procedure                               Abnormality         Status                     ---------                               -----------         ------                     Clostridium Difficile To...[[id number]]  Abnormal            Final result                 Please view results for these tests on the individual orders.    Clostridium Difficile Toxin, PCR - Stool, Per Rectum [[id number]]  (Abnormal) Collected: 06/27/22 1639    Lab Status: Final result Specimen: Stool from Per Rectum Updated: 06/27/22 1850     C. Difficile Toxins by PCR Positive    Narrative:      Performance characteristics of test not established for patients <2 years of age.    COVID PRE-OP /  PRE-PROCEDURE SCREENING ORDER (NO ISOLATION) - Swab, Nasal Cavity [[id number]]  (Normal) Collected: 06/27/22 1141    Lab Status: Final result Specimen: Swab from Nasal Cavity Updated: 06/27/22 1223    Narrative:      The following orders were created for panel order COVID PRE-OP / PRE-PROCEDURE SCREENING ORDER (NO ISOLATION) - Swab, Nasal Cavity.  Procedure                               Abnormality         Status                     ---------                               -----------         ------                     COVID-19,[facility] IN-JULI...[[id number]]  Normal              Final result                 Please view results for these tests on the individual orders.    COVID-19,[facility] IN-HOUSE,Nasal Swab No Transport Media 3-4 HR TAT - Swab, Nasal Cavity [[id number]]  (Normal) Collected: 06/27/22 1141    Lab Status: Final result Specimen: Swab from Nasal Cavity Updated: 06/27/22 1223     COVID19 Not Detected    Narrative:      Fact sheet for providers: https://www.fda.gov/media/259750/download     Fact sheet for patients: https://www.fda.gov/media/648572/download    Test performed by PCR.    Consider negative results in combination with clinical observations, patient history, and epidemiological information.  Fact sheet for providers: https://www.fda.gov/media/001851/download     Fact sheet for patients: https://www.fda.gov/media/714544/download    Test performed by PCR.    Consider negative results in combination with clinical observations, patient history, and epidemiological information.    Blood Culture With DENNISE - Blood, Blood, Central Line [[id number]]  (Normal) Collected: 06/27/22 1011    Lab Status: Final result Specimen: Blood, Central Line Updated: 07/02/22 1048     Blood Culture No growth at 5 days    Blood Culture With DENNISE - Blood, Hand, Left [[id number]]  (Normal) Collected: 06/27/22 1001    Lab Status: Final result Specimen: Blood from Hand, Left Updated: 07/02/22 1032     Blood Culture No growth at 5  "days        Chief Complaint on Day of Discharge: Adamantly refuses rehabilitation placement and wants to go home    History of Present Illness on Day of Discharge: Feeling better, tolerating current diet with some displeasure. States has ambulated without difficulty and can take care of himself at home.      Hospital Course:  The patient is a 82 y.o. male who presented to [facility] with hypotension, melena and diarrhea. RBC, FFP and platelets were transfuse and he was evaluated promptly by GI with EGD and found to have a duodenal ulcer with arterial bleeding. Attempts to control the bleeding with endoscopy were unsuccessful and he was immediately transferred to the OR. He underwent gastrotomy/duodenotomy with suture repair of the bleeding ulcer.  Post op hemoglobin has remained stable around 9, no further bleeding noted.    Also he tested positive for C diff antigen and was treated with oral vancomycin. He can complete this treatment at home. Vancomycin was switched to Flagyl due to insurance costs.     He is adamant about going home, I believe he will become irate and disoriented if not returned to his environment. Patient appears strong and capable of taking care of himself.     Condition on Discharge:  Stable    Physical Exam on Discharge:  /90 (BP Location: Right arm, Patient Position: Lying)   Pulse 80   Temp 98.4 °F (36.9 °C) (Oral)   Resp 16   Ht 180.3 cm (71\")   Wt 87 kg (191 lb 12.8 oz)   SpO2 92%   BMI 26.75 kg/m²   Physical Exam  Constitutional:       Appearance: He is well-developed. He is not ill-appearing or diaphoretic.   HENT:      Head: Normocephalic and atraumatic.      Right Ear: External ear normal.      Left Ear: External ear normal.      Nose: Nose normal.      Mouth/Throat:      Mouth: Mucous membranes are dry.   Eyes:      General:         Right eye: No discharge.         Left eye: No discharge.      Extraocular Movements: Extraocular movements intact.      " Conjunctiva/sclera: Conjunctivae normal.      Pupils: Pupils are equal, round, and reactive to light.   Neck:      Vascular: No JVD.   Cardiovascular:      Rate and Rhythm: Regular rhythm. Tachycardia present.      Heart sounds: Normal heart sounds. No murmur heard.  Pulmonary:      Effort: Pulmonary effort is normal. No respiratory distress.      Breath sounds: Normal breath sounds. No wheezing or rales.   Chest:      Chest wall: No tenderness.   Abdominal:      General: Bowel sounds are normal. There is no distension.      Palpations: Abdomen is soft.      Tenderness: There is no abdominal tenderness. There is no guarding or rebound.   Musculoskeletal:         General: No tenderness or deformity. Normal range of motion.      Cervical back: Normal range of motion and neck supple. No rigidity.      Right lower leg: No edema.      Left lower leg: No edema.   Skin:     General: Skin is warm and dry.      Findings: No rash.   Neurological:      General: No focal deficit present.      Mental Status: He is alert and oriented to person, place, and time. Mental status is at baseline.      Cranial Nerves: No cranial nerve deficit.      Sensory: No sensory deficit.      Motor: No abnormal muscle tone.      Deep Tendon Reflexes: Reflexes normal.   Psychiatric:         Mood and Affect: Mood normal.         Behavior: Behavior normal.     Discharge Disposition:  Home    Discharge Medications:     Discharge Medications      New Medications      Instructions Start Date   acetaminophen 325 MG tablet  Commonly known as: TYLENOL   650 mg, Oral, Every 6 Hours PRN      pantoprazole 40 MG EC tablet  Commonly known as: PROTONIX   40 mg, Oral, 2 Times Daily Before Meals      saccharomyces boulardii 250 MG capsule  Commonly known as: Florastor   250 mg, Oral, 2 Times Daily      vancomycin 125 MG capsule  Commonly known as: VANCOCIN   125 mg, Oral, Every 6 Hours Scheduled         Continue These Medications      Instructions Start Date    fenofibrate 145 MG tablet  Commonly known as: TRICOR   145 mg, Oral, Daily      hydroCHLOROthiazide 25 MG tablet  Commonly known as: HYDRODIURIL   25 mg, Oral, Daily      lisinopril 40 MG tablet  Commonly known as: PRINIVIL,ZESTRIL   40 mg, Oral, Daily           Discharge Diet:   Gastric soft to regular     Activity at Discharge:   Resume usual activity    Follow-up Appointments:   PCP in 1-2 weeks    Electronically signed by Randy Kirk MD, 07/05/22, 11:14 CDT.    Time: 45 minutes

## 2022-07-05 NOTE — THERAPY TREATMENT NOTE
Patient Name: Echo Temple  : 1940    MRN: [id number]                              Today's Date: 2022       Admit Date: 2022    Visit Dx:     ICD-10-CM ICD-9-CM   1. Hypotension, unspecified hypotension type  I95.9 458.9   2. Impaired mobility  Z74.09 799.89   3. Decreased activities of daily living (ADL)  Z78.9 V49.89   4. Dysphagia, unspecified type  R13.10 787.20     Patient Active Problem List   Diagnosis   • Hypotension   • Bleeding duodenal ulcer   • Acute blood loss anemia   • Sepsis (HCC)   • Diarrhea     Past Medical History:   Diagnosis Date   • Cancer (HCC)    • Hypertension      Past Surgical History:   Procedure Laterality Date   • ENDOSCOPY N/A 2022    Procedure: ESOPHAGOGASTRODUODENOSCOPY WITH ANESTHESIA;  Surgeon: Rene Brice MD;  Location: [facility] ENDOSCOPY;  Service: Gastroenterology;  Laterality: N/A;  pre anemia   post duodenal ulcer   Dr. Sam   • EXPLORATORY LAPAROTOMY N/A 2022    Procedure: LAPAROTOMY EXPLORATORY;  Surgeon: Nancy Park MD;  Location: [facility] OR;  Service: General;  Laterality: N/A;   • HEMORRHOIDECTOMY        General Information     Row Name 22 1028          OT Time and Intention    Document Type therapy note (daily note)  -     Mode of Treatment occupational therapy  -     Row Name 22 1028          General Information    Patient Profile Reviewed yes  -     Existing Precautions/Restrictions fall  -     Row Name 22 1028          Cognition    Orientation Status (Cognition) oriented to;person;place;situation  -     Row Name 22 1028          Safety Issues, Functional Mobility    Impairments Affecting Function (Mobility) balance;endurance/activity tolerance;strength  -           User Key  (r) = Recorded By, (t) = Taken By, (c) = Cosigned By    Initials Name Provider Type     Suze Abarca, OTR/L Occupational Therapist                 Mobility/ADL's     Row Name 22 1028          Transfers     Transfers sit-stand transfer;stand-sit transfer;toilet transfer  -     Sit-Stand [place] (Transfers) standby assist;verbal cues  -     Stand-Sit [place] (Transfers) standby assist;verbal cues  -     [place] Level (Toilet Transfer) standby assist  -     Assistive Device (Toilet Transfer) walker, front-wheeled;commode  -[facility] Name 07/05/22 1028          Stand-Sit Transfer    Assistive Device (Stand-Sit Transfers) walker, front-wheeled  -[facility] Name 07/05/22 1028          Toilet Transfer    Type (Toilet Transfer) sit-stand  -CH     Row Name 07/05/22 1028          Functional Mobility    Functional Mobility- Ind. Level standby assist  -     Functional Mobility- Device walker, front-wheeled  -[facility] Name 07/05/22 1028          Activities of Daily Living    BADL Assessment/Intervention toileting;grooming  -CH     Row Name 07/05/22 1028          Toileting Assessment/Training    [place] Level (Toileting) standby assist;adjust/manage clothing;perform perineal hygiene  -     Assistive Devices (Toileting) commode  -     Position (Toileting) unsupported sitting;supported standing  -CH     Row Name 07/05/22 1028          Grooming Assessment/Training    [place] Level (Grooming) wash face, hands;standby assist  -     Position (Grooming) supported standing  -           User Key  (r) = Recorded By, (t) = Taken By, (c) = Cosigned By    Initials Name Provider Type     Suze Abarca, OTR/L Occupational Therapist               Obj/Interventions     [facility] Name 07/05/22 1028          Range of Motion Comprehensive    General Range of Motion bilateral upper extremity ROM WFL  -[facility] Name 07/05/22 1028          Strength Comprehensive (MMT)    General Manual Muscle Testing (MMT) Assessment no strength deficits identified  -[facility] Name 07/05/22 1028          Balance    Balance Assessment sitting static balance;sitting dynamic balance;sit to stand dynamic balance;standing static  balance;standing dynamic balance  -     Static Sitting Balance standby assist  -     Dynamic Sitting Balance standby assist  -     Position, Sitting Balance unsupported  -     Sit to Stand Dynamic Balance standby assist  -     Dynamic Standing Balance standby assist  -     Position/Device Used, Standing Balance walker, rolling  -     Balance Interventions sitting;standing;sit to stand;supported;static;dynamic  -           User Key  (r) = Recorded By, (t) = Taken By, (c) = Cosigned By    Initials Name Provider Type    Suze Camarillo, OTR/L Occupational Therapist               Goals/Plan    No documentation.                Clinical Impression     Row Name 07/05/22 1028          Plan of Care Review    Plan of Care Reviewed With patient  -     Progress no change  -     Outcome Evaluation OT tx completed.  Pt. up on toilet in bathroom.  He completed toileting, t/f, standing grooming & amb in room at [facility].  [facility] regarding safety, falls risks, & energy conservation. Mr Temple is irritable and wanting to DC home today.  Cont OT tx while he is here to cont strengthening.  Pt is refused SNF placement  -     Row Name 07/05/22 1028          Therapy Assessment/Plan (OT)    Patient/Family Therapy Goal Statement (OT) I am going home.  -     Rehab Potential (OT) fair, will monitor progress closely  -     Criteria for Skilled Therapeutic Interventions Met (OT) yes;skilled treatment is necessary  -     Therapy Frequency (OT) 5 times/wk  -     Row Name 07/05/22 1028          Therapy Plan Review/Discharge Plan (OT)    Anticipated Discharge Disposition (OT) skilled nursing facility  -     Row Name 07/05/22 1028          Positioning and Restraints    In Chair sitting;reclined;call light within reach;encouraged to call for assist;legs elevated  -           User Key  (r) = Recorded By, (t) = Taken By, (c) = Cosigned By    Initials Name Provider Type    Suze Camarillo, CONCEPCIONR/L Occupational  Therapist               Outcome Measures     Row Name 07/05/22 1028          How much help from another is currently needed...    Putting on and taking off regular lower body clothing? 3  -CH     Bathing (including washing, rinsing, and drying) 3  -CH     Toileting (which includes using toilet bed pan or urinal) 4  -CH     Putting on and taking off regular upper body clothing 4  -CH     Taking care of personal grooming (such as brushing teeth) 4  -CH     Eating meals 4  -CH     AM-PAC 6 Clicks Score (OT) 22  -     Row Name 07/05/22 1028          Functional Assessment    Outcome Measure Options AM-PAC 6 Clicks Daily Activity (OT)  -           User Key  (r) = Recorded By, (t) = Taken By, (c) = Cosigned By    Initials Name Provider Type     Suze Abarca, OTR/L Occupational Therapist                Occupational Therapy Education                 Title: PT OT SLP Therapies (Done)     Topic: Occupational Therapy (Done)     Point: ADL training (Done)     Description:   Instruct learner(s) on proper safety adaptation and remediation techniques during self care or transfers.   Instruct in proper use of assistive devices.              Learning Progress Summary           Patient Acceptance, E,D, VU,NR by  at 7/5/2022 1057    Acceptance, E, VU by AD at 7/1/2022 1430    Acceptance, E,D, VU,NR by  at 6/30/2022 1519    Acceptance, E,D, VU by  at 6/29/2022 1418    Acceptance, E,D, VU,NR by  at 6/27/2022 1415    Acceptance, E, VU by AD at 6/26/2022 1454                   Point: Home exercise program (Done)     Description:   Instruct learner(s) on appropriate technique for monitoring, assisting and/or progressing therapeutic exercises/activities.              Learning Progress Summary           Patient Acceptance, E, VU by AD at 7/1/2022 1430    Acceptance, E,D, VU,NR by  at 6/30/2022 1519    Acceptance, E,D, VU by  at 6/29/2022 1418                   Point: Precautions (Done)     Description:   Instruct  learner(s) on prescribed precautions during self-care and functional transfers.              Learning Progress Summary           Patient Acceptance, E, VU by AD at 7/1/2022 1430    Acceptance, E,D, VU,NR by  at 6/30/2022 1519    Acceptance, E,D, VU by  at 6/29/2022 1418    Acceptance, E, VU by AD at 6/26/2022 1454                   Point: Body mechanics (Done)     Description:   Instruct learner(s) on proper positioning and spine alignment during self-care, functional mobility activities and/or exercises.              Learning Progress Summary           Patient Acceptance, E, VU by AD at 7/1/2022 1430    Acceptance, E,D, VU,NR by MW at 6/30/2022 1519    Acceptance, E,D, VU by  at 6/29/2022 1418                               User Key     Initials Effective Dates Name Provider Type Discipline     06/16/21 -  Suze Abarca, OTR/L Occupational Therapist OT     08/28/18 -  Abeba Borges, OTR/L Occupational Therapist OT    AD 05/04/22 -  Seema Castellanos OT Student OT Student OT              OT Recommendation and Plan  Therapy Frequency (OT): 5 times/wk  Plan of Care Review  Plan of Care Reviewed With: patient  Progress: no change  Outcome Evaluation: OT tx completed.  Pt. up on toilet in bathroom.  He completed toileting, t/f, standing grooming & amb in room at [facility] with [facility].  [facility] regarding safety, falls risks, & energy conservation. Mr Temple is irritable and wanting to DC home today.  Cont OT tx while he is here to cont strengthening.  Pt is refused SNF placement     Time Calculation:    Time Calculation- OT     Row Name 07/05/22 1028             Time Calculation- OT    OT Start Time 1028  -CH      OT Stop Time 1039  -CH      OT Time Calculation (min) 11 min  -CH      Total Timed Code Minutes- OT 11 minute(s)  -CH      OT Received On 07/05/22  -CH              Timed Charges    54709 - OT Self Care/Mgmt Minutes 11  -CH              Total Minutes    Timed Charges Total Minutes 11  -CH       Total Minutes 11   -            User Key  (r) = Recorded By, (t) = Taken By, (c) = Cosigned By    Initials Name Provider Type     Suze Abarca OTR/L Occupational Therapist              Therapy Charges for Today     Code Description Service Date Service Provider Modifiers Qty    1940222 HC OT SELF CARE/MGMT/TRAIN EA 15 MIN 7/5/2022 Suze Abarca OTR/L GO 1               RAVI Hansen/ADRIEN  7/5/2022

## 2022-07-05 NOTE — PAYOR COMM NOTE
"Echo Will (82 y.o. Male) CASE [id number]   Cont stay  Please review clinical for additional day    [facility] phone   Fax                Date of Birth   1940    Social Security Number       Address   [address] [place] [id number]    Home Phone   [phone]    MRN   [id number]       Hindu   Baptism    Marital Status                               Admission Date   6/19/22    Admission Type   Emergency    Admitting Provider   Randy Kirk MD    Attending Provider   Randy Kirk MD    Department, Room/Bed   [facility] 3C, 376/1       Discharge Date       Discharge Disposition       Discharge Destination                               Attending Provider: Randy Kirk MD    Allergies: Amoxicillin, Mobic [Meloxicam]    Isolation: Spore   Infection: C.difficile (06/27/22)   Code Status: CPR   Advance Care Planning Activity    Ht: 180.3 cm (71\")   Wt: 87 kg (191 lb 12.8 oz)    Admission Cmt: None   Principal Problem: Bleeding duodenal ulcer [K26.4]                 Active Insurance as of 6/19/2022     Primary Coverage     Payor Plan Insurance Group Employer/Plan Group    ANTHEM MEDICARE REPLACEMENT ANTHEM MEDICARE ADVANTAGE [id number]     Payor Plan Address Payor Plan Phone Number Payor Plan Fax Number Effective Dates    [address] [phone]  1/1/2015 - None Entered    [facility] [id number]       Subscriber Name Subscriber Birth Date Member ID       ECHO WILL 1940 [id number]                 Emergency Contacts      (Rel.) Home Phone Work Phone Mobile Phone    WindyMadelyn (Spouse) [phone] -- [phone]    Kylie Reveles (Daughter) [phone] -- --    Tayler Hummel (Other) [phone] -- --            Vital Signs (last day)     Date/Time Temp Temp src Pulse Resp BP Patient Position SpO2    07/05/22 0844 98.4 (36.9) Oral 80 16 150/90 Lying --    07/05/22 " 0335 98.6 (37) Oral 75 16 123/70 Lying 92    07/04/22 2241 98 (36.7) Oral 73 16 135/83 Lying 92    07/04/22 2023 98.1 (36.7) Oral 75 16 131/76 Lying 93    07/04/22 1739 -- -- -- -- 128/78 Lying --    07/04/22 1658 98.3 (36.8) Axillary 83 16 137/75 Lying 95    07/04/22 0811 98 (36.7) Oral 79 16 141/72 Lying 95    07/04/22 0300 97.9 (36.6) Oral 80 16 148/80 Lying 94    07/04/22 0049 98.4 (36.9) Oral 75 18 140/84 Lying 94          Current Facility-Administered Medications   Medication Dose Route Frequency Provider Last Rate Last Admin   • acetaminophen (TYLENOL) tablet 650 mg  650 mg Oral Q6H PRN Yas Lunsford MD   650 mg at 07/04/22 2331   • ipratropium-albuterol (DUO-NEB) nebulizer solution 3 mL  3 mL Nebulization Q4H PRN Morales Ireland MD   3 mL at 06/27/22 0746   • labetalol (NORMODYNE,TRANDATE) injection 10 mg  10 mg Intravenous Q4H PRN Morales Ireland MD       • lidocaine (LIDODERM) 5 % 2 patch  2 patch Transdermal Q24H Morales Ireland MD   2 patch at 07/05/22 0840   • lisinopril (PRINIVIL,ZESTRIL) tablet 10 mg  10 mg Oral Q24H Yoan Mckeon DO   10 mg at 07/05/22 0840   • ondansetron (ZOFRAN) injection 4 mg  4 mg Intravenous Q6H PRN Morales Ireland MD       • pantoprazole (PROTONIX) EC tablet 40 mg  40 mg Oral BID AC Yoan Mckeon DO   40 mg at 07/05/22 0840   • sodium chloride 0.9 % flush 10 mL  10 mL Intravenous PRN Morales Ireland MD   10 mL at 07/02/22 2115   • sodium chloride 0.9 % flush 10 mL  10 mL Intravenous PRN Morales Irelando, MD   10 mL at 07/01/22 2201   • vancomycin (VANCOCIN) capsule 125 mg  125 mg Oral Q6H Yoan Mckeon DO   125 mg at 07/05/22 1329       7/5  note :     [facility] pending precert       Patient/Family in Agreement with Plan yes     Plan Comments [facility] is still waiting for insurance approval. Will d/c to [facility] once approved.     Presenting Problem/Chief  Complaint:  Hypotension     7/5 note   Hypovolemic shock present on admission due to acute blood loss anemia due to bleeding duodenal ulcer status post surgical repair  Sepsis due to clostridium difficile, improved  Clostridium difficile colitis and diarrhea, improving  hgb 9.7/ hct 29.5    Na 133       Pt c/o headache this shift; given prn meds per orders. Pt is IID. Pt is on a GI soft diet. Pt is up with SBA and walker. Pt is voiding. Pt is on RA and tolerating well

## 2022-07-05 NOTE — PLAN OF CARE
Goal Outcome Evaluation:  Plan of Care Reviewed With: patient        Progress: no change  Outcome Evaluation: OT tx completed.  Pt. up on toilet in bathroom.  He completed toileting, t/f, standing grooming & amb in room at [facility] with [facility].  [facility] regarding safety, falls risks, & energy conservation. Mr Temple is irritable and wanting to DC home today.  Cont OT tx while he is here to cont strengthening.  Pt is refused SNF placement

## 2022-07-05 NOTE — PLAN OF CARE
Goal Outcome Evaluation:  Plan of Care Reviewed With: patient        Progress: improving  Outcome Evaluation: Pt c/o fatigue from not sleeping. Bed mob Independent to EOB. Stood and amb 30' x12 w/ RW and SBA. No LOB or difficulty.

## 2022-07-05 NOTE — PLAN OF CARE
Goal Outcome Evaluation:      Pt c/o headache this shift; given prn meds per orders. Pt is IID. Pt is on a GI soft diet. Pt is up with SBA and walker. Pt is voiding. Pt is on RA and tolerating well. VSS; safety maintained.

## 2022-07-05 NOTE — THERAPY TREATMENT NOTE
Acute Care - Physical Therapy Treatment Note  [facility]     Patient Name: Echo Temple  : 1940  MRN: [id number]  Today's Date: 2022      Visit Dx:     ICD-10-CM ICD-9-CM   1. Hypotension, unspecified hypotension type  I95.9 458.9   2. Impaired mobility  Z74.09 799.89   3. Decreased activities of daily living (ADL)  Z78.9 V49.89   4. Dysphagia, unspecified type  R13.10 787.20     Patient Active Problem List   Diagnosis   • Acute blood loss anemia   • Diarrhea     Past Medical History:   Diagnosis Date   • Cancer (HCC)    • Hypertension      Past Surgical History:   Procedure Laterality Date   • ENDOSCOPY N/A 2022    Procedure: ESOPHAGOGASTRODUODENOSCOPY WITH ANESTHESIA;  Surgeon: Rene Brice MD;  Location: [facility] ENDOSCOPY;  Service: Gastroenterology;  Laterality: N/A;  pre anemia   post duodenal ulcer   Dr. Sam   • EXPLORATORY LAPAROTOMY N/A 2022    Procedure: LAPAROTOMY EXPLORATORY;  Surgeon: Nancy Park MD;  Location: [facility] OR;  Service: General;  Laterality: N/A;   • HEMORRHOIDECTOMY       PT Assessment (last 12 hours)     PT Evaluation and Treatment     Row Name 22 1518          Physical Therapy Time and Intention    Subjective Information complains of;fatigue  -TB     Document Type therapy note (daily note)  -TB     Mode of Treatment physical therapy  -TB     Patient Effort good  -TB     Row Name 22 1518          General Information    Existing Precautions/Restrictions fall  -TB     Row Name 22 1518          Pain    Pretreatment Pain Rating 0/10 - no pain  -TB     Posttreatment Pain Rating 0/10 - no pain  -TB     Row Name 22 1518          Bed Mobility    Bed Mobility scooting/bridging;supine-sit;sit-supine  -TB     Scooting/Bridging [place] (Bed Mobility) modified independence  -TB     Supine-Sit [place] (Bed Mobility) standby assist;verbal cues  -TB     Sit-Supine [place] (Bed Mobility) standby assist;verbal cues  -TB     Assistive  Device (Bed Mobility) head of bed elevated  -TB     Row Name 07/05/22 1518          Transfers    Transfers sit-stand transfer;stand-sit transfer  -TB     Sit-Stand [place] (Transfers) standby assist;verbal cues  -TB     Stand-Sit [place] (Transfers) standby assist;verbal cues  -TB     Row Name 07/05/22 1518          Sit-Stand Transfer    Assistive Device (Sit-Stand Transfers) walker, front-wheeled  -TB     Row Name 07/05/22 1518          Gait/Stairs (Locomotion)    [place] Level (Gait) standby assist  -TB     Assistive Device (Gait) walker, front-wheeled  -TB     Distance in Feet (Gait) 30'x12  -TB     Deviations/Abnormal Patterns (Gait) gait speed decreased  -TB     Bilateral Gait Deviations forward flexed posture  -TB     Row Name             Wound 06/20/22 1603 midline abdomen Incision    Wound - Properties Group Placement Date: 06/20/22  -KT Placement Time: 1603  -KT Present on Hospital Admission: N  -KT Orientation: midline  -KT Location: abdomen  -KT Primary Wound Type: Incision  -KT     Retired Wound - Properties Group Placement Date: 06/20/22  -KT Placement Time: 1603  -KT Present on Hospital Admission: N  -KT Orientation: midline  -KT Location: abdomen  -KT Primary Wound Type: Incision  -KT     Retired Wound - Properties Group Date first assessed: 06/20/22  -KT Time first assessed: 1603  -KT Present on Hospital Admission: N  -KT Location: abdomen  -KT Primary Wound Type: Incision  -KT     Row Name 07/05/22 1518          Plan of Care Review    Plan of Care Reviewed With patient  -TB     Progress improving  -TB     Outcome Evaluation Pt c/o fatigue from not sleeping. Bed mob Independent to EOB. Stood and amb 30' x12 w/ RW and SBA. No LOB or difficulty.  -TB     Row Name 07/05/22 1518          Positioning and Restraints    Pre-Treatment Position in bed  -TB     Post Treatment Position bed  -TB     In Bed fowlers;call light within reach;encouraged to call for assist;side rails up x2  -TB            User Key  (r) = Recorded By, (t) = Taken By, (c) = Cosigned By    Initials Name Provider Type    TB Kingston Burgos, PTA Physical Therapist Assistant    Amandeep Da Silva, RN Registered Nurse                Physical Therapy Education                 Title: PT OT SLP Therapies (Done)     Topic: Physical Therapy (Done)     Point: Mobility training (Done)     Learning Progress Summary           Patient Acceptance, E, VU by  at 7/1/2022 1444    Comment: safety with mobility.  Progression with POC.  home with HH vs SNF    Acceptance, E,TB, VU by  at 6/28/2022 1411    Comment: bed mobility    Acceptance, E, VU,DU by BRYAN at 6/26/2022 1325    Comment: Pt presents to PT s/p laparotomy exploratory resulting from a bleeding duodenal ulcer. Pt was educated on the benefits of ambulation and moving around to increase independence and functional mobility. Anticipate d.c to SNF.                   Point: Home exercise program (Done)     Learning Progress Summary           Patient Acceptance, E,TB, VU by  at 6/30/2022 1323    Comment: BLE HEP                   Point: Body mechanics (Done)     Learning Progress Summary           Patient Acceptance, E,TB, VU by  at 7/4/2022 1430    Comment: upright posture with gait                   Point: Precautions (Done)     Learning Progress Summary           Patient Acceptance, E,TB, VU by  at 6/29/2022 1023    Comment: call for assist                               User Key     Initials Effective Dates Name Provider Type [facility] 06/16/21 -  Charlotte Romo, PTA Physical Therapist Assistant PT    SHANNAN 12/08/16 -  Mir Coburn PTA Physical Therapist Assistant PT    BRYAN 05/04/22 -  Lokesh Sanchez PT Student PT Student PT              PT Recommendation and Plan     Plan of Care Reviewed With: patient  Progress: improving  Outcome Evaluation: Pt c/o fatigue from not sleeping. Bed mob Independent to EOB. Stood and amb 30' x12 w/ RW and SBA. No LOB or difficulty.   Outcome  Measures     Row Name 07/05/22 1500 07/04/22 1400          How much help from another person do you currently need...    Turning from your back to your side while in flat bed without using bedrails? 4  -TB 4  -AH     Moving from lying on back to sitting on the side of a flat bed without bedrails? 4  -TB 4  -AH     Moving to and from a bed to a chair (including a wheelchair)? 4  -TB 4  -AH     Standing up from a chair using your arms (e.g., wheelchair, bedside chair)? 4  -TB 3  -AH     Climbing 3-5 steps with a railing? 3  -TB 3  -AH     To walk in hospital room? 4  -TB 3  -AH     AM-PAC 6 Clicks Score (PT) 23  -TB 21  -AH            Functional Assessment    Outcome Measure Options AM-PAC 6 Clicks Basic Mobility (PT)  -TB AM-PAC 6 Clicks Basic Mobility (PT)  -AH           User Key  (r) = Recorded By, (t) = Taken By, (c) = Cosigned By    Initials Name Provider Type     Charlotte Romo PTA Physical Therapist Assistant    Kingston Sanchez PTA Physical Therapist Assistant                 Time Calculation:    PT Charges     Row Name 07/05/22 1547             Time Calculation    Start Time 1518  -TB      Stop Time 1541  -TB      Time Calculation (min) 23 min  -TB      PT Received On 07/05/22  -TB              Time Calculation- PT    Total Timed Code Minutes- PT 23 minute(s)  -TB            User Key  (r) = Recorded By, (t) = Taken By, (c) = Cosigned By    Initials Name Provider Type    Kingston Sanchez PTA Physical Therapist Assistant              Therapy Charges for Today     Code Description Service Date Service Provider Modifiers Qty    [id number] HC GAIT TRAINING EA 15 MIN 7/5/2022 Kingston Burgos PTA GP 2          PT G-Codes  Outcome Measure Options: AM-PAC 6 Clicks Basic Mobility (PT)  AM-PAC 6 Clicks Score (PT): 23  AM-PAC 6 Clicks Score (OT): 22    Kingston Burgos PTA  7/5/2022

## 2022-07-06 ENCOUNTER — READMISSION MANAGEMENT (OUTPATIENT)
Dept: CALL CENTER | Facility: HOSPITAL | Age: 82
End: 2022-07-06

## 2022-07-06 VITALS
WEIGHT: 183.2 LBS | HEIGHT: 71 IN | HEART RATE: 78 BPM | SYSTOLIC BLOOD PRESSURE: 108 MMHG | DIASTOLIC BLOOD PRESSURE: 65 MMHG | OXYGEN SATURATION: 100 % | BODY MASS INDEX: 25.65 KG/M2 | TEMPERATURE: 97.7 F | RESPIRATION RATE: 16 BRPM

## 2022-07-06 PROCEDURE — 97164 PT RE-EVAL EST PLAN CARE: CPT

## 2022-07-06 PROCEDURE — 97168 OT RE-EVAL EST PLAN CARE: CPT | Performed by: OCCUPATIONAL THERAPIST

## 2022-07-06 RX ADMIN — VANCOMYCIN HYDROCHLORIDE 125 MG: 125 CAPSULE ORAL at 00:00

## 2022-07-06 RX ADMIN — VANCOMYCIN HYDROCHLORIDE 125 MG: 125 CAPSULE ORAL at 06:10

## 2022-07-06 RX ADMIN — LIDOCAINE 2 PATCH: 50 PATCH CUTANEOUS at 08:28

## 2022-07-06 RX ADMIN — VANCOMYCIN HYDROCHLORIDE 125 MG: 125 CAPSULE ORAL at 18:33

## 2022-07-06 RX ADMIN — VANCOMYCIN HYDROCHLORIDE 125 MG: 125 CAPSULE ORAL at 12:01

## 2022-07-06 RX ADMIN — LISINOPRIL 10 MG: 10 TABLET ORAL at 08:28

## 2022-07-06 RX ADMIN — PANTOPRAZOLE SODIUM 40 MG: 40 TABLET, DELAYED RELEASE ORAL at 08:28

## 2022-07-06 NOTE — DISCHARGE SUMMARY
[facility] Medicine Services  DISCHARGE SUMMARY       Date of Admission: 6/19/2022  Date of Discharge:  7/6/2022  Primary Care Physician: Tenzin Sam MD    Presenting Problem/Chief Complaint:  Hypotension    Final Discharge Diagnoses:  Hypovolemic shock present on admission due to acute blood loss anemia due to bleeding duodenal ulcer status post surgical repair  Sepsis due to clostridium difficile, improved  Clostridium difficile colitis and diarrhea, improving    Consults: Dr Nancy Jamison general surgery, Dr Rene Brice GI, Dr Jomar Gonzalez pulmonology    Procedures Performed:   6/20/2022 EGD   - Normal gastroesophageal junction and esophagus.  - Mild, benign-appearing esophageal stenosis.  - A small, sliding hiatal hernia was intermittently present during the exam.  - Moderate erosive gastritis in the prepyloric antrum.  - Giant duodenal ulcer at apex of duodenal bulb with arterial bleeding. Multiple modalities were  attempted (bipolar cautery, epinephrine injection, epi spray of ulcer bed, and placement of 2 large clips)  but were unsuccessful. Emergency consultation with general surgeon (Dr. Nancy Park) was  obtained intraoperatively, and he was taken to the OR after the procedure was terminated..  - Normal second portion of the duodenum and third portion of the duodenum.  - The examined portions of the nasopharynx, oropharynx and larynx were normal.  - The examination was otherwise normal.  - No specimens collected.    6/20/2022 (1) Exploratory Laparotomy   (2) Duodenotomy (34851) for exploration   (3) Gastrotomy (57049) with suture repair of bleeding ulcer   (4) Closure of duodenotomy and Gastrostomy     Pertinent Test Results:   Imaging Results (All)     Procedure Component Value Units Date/Time    CT Chest With & Without Contrast Diagnostic [[id number]] Collected: 06/27/22 1234     Updated: 06/27/22 1251    Narrative:      EXAMINATION: CT CHEST W WO  CONTRAST DIAGNOSTIC-      6/27/2022 10:32 AM CDT     HISTORY: Pneumonia, complication suspected, xray done;  I95.9-Hypotension, unspecified; Z74.09-Other reduced mobility;  Z78.9-Other specified health status     In order to have a CT radiation dose as low as reasonably achievable  Automated Exposure Control was utilized for adjustment of the mA and/or  KV according to patient size.     DLP in mGycm= 403     The CT scan of the chest is performed in before and after intravenous  contrast enhancement.     Images are acquired in axial plane and subsequent reconstruction in  coronal and sagittal planes.     Comparison is made with the previous study dated 6/20/2022.     The lungs are poorly expanded.     There are atelectatic changes in the right lower lung and a small  bibasal pleural effusion which was not seen in the previous study.     Previously seen 3 mm nodule in the left lower lobe, image #108 in series  3 is stable.     There is a small pneumatocele in the right posterior costophrenic sulcus  laterally.     The tracheobronchial structures are normal and patent. No endobronchial  nodules. A small diverticulum from the medial aspect of the right  mainstem bronchus is similar to the previous study.     There are groundglass opacity/infiltrate involving the left upper and  left lower lobe which was not seen in the previous study.     A heterogeneous low-density nonenhancing mass in the right lobe of the  thyroid gland is reidentified. It measures 3.2 cm in AP dimension.     There is no axillary lymphadenopathy.     Atheromatous changes thoracic aorta is seen. A mild ectasia of the  ascending thoracic aorta persistent dysplasias 4.3 cm. No dissection.  Atheromatous changes in the coronary arteries are similar to the  previous study.     Normal opacification of pulmonary arteries and branches. No filling  defects.     There is no evidence of mediastinal or hilar mass or lymphadenopathy.     Nasogastric tube is in  place.     The abdomen is separately reviewed and reported.     The images reviewed in bone window show chronic degenerative changes of  the thoracic spine. No focal bony abnormality or a bone lesion.       Impression:      1. Groundglass opacity/infiltrate in the left lung represent an acute  inflammatory/infectious process.  2. Persistent moderate ectasia of the ascending thoracic aorta. No  dissection.  3. Right thyroid nodule is stable.  4. Small bibasal pleural effusion and lower lobar atelectatic changes.                             This report was finalized on 06/27/2022 12:48 by Dr. Byron Delgadillo MD.    CT Abdomen Pelvis With & Without Contrast [[id number]] Collected: 06/27/22 1140     Updated: 06/27/22 1206    Narrative:      EXAMINATION: CT ABDOMEN PELVIS W WO CONTRAST-      6/27/2022 10:32 AM CDT     HISTORY: Abdominal pain, post-op; I95.9-Hypotension, unspecified;  Z74.09-Other reduced mobility; Z78.9-Other specified health status     In order to have a CT radiation dose as low as reasonably achievable  Automated Exposure Control was utilized for adjustment of the mA and/or  KV according to patient size.     DLP in mGycm= 703     The CT scan of the abdomen and pelvis is performed before and after  intravenous contrast and is patent.     The Images are acquired in axial plane and subsequent reconstruction in  coronal and sagittal planes.     Comparison is made with the previous study dated 6/20/2022.     The lung bases included in the study show mild atelectatic changes and a  small bibasal pleural effusion. Atheromatous changes of coronary  arteries are noted.     Unenhanced liver is unremarkable except for low-density nodule in the  left lobe similar to the previous study. The spleen is normal.     Moderately distended gallbladder is seen with a layering high-density  fluid is may represent sludge. No calculi.     Pancreas is normal.     The adrenal glands bilaterally are normal.     Moderate  lobulation and renal contour bilaterally. Low-density nodules  in both kidneys are reidentified. The largest nodule in the upper pole  of the left kidney measures 2.3 cm. The CT density suggests a cyst.  Several tiny nodule the kidneys bilaterally are too small to be further  characterized. No calculi. No hydronephrosis. The ureters are poorly  visualized. No intrinsic abnormality. Small bowel amount of air is seen  in the nondependent part of the urinary bladder. The urinary bladder is  well distended. No calculi.     There is a nasogastric tube in place. The stomach is decompressed  compared with the previous study. There is moderate irregular thickening  of the gastric antrum and duodenal bulb which was not obvious in the  previous study. There is a small diverticulum from the proximal  descending duodenum projectile     Head of the pancreas. There is no evidence of contrast extravasation.  There is moderate retroperitoneal fat infiltration around the descending  duodenum. The small bowel is normal. The appendix are normal. Moderate  gas and stool is seen in the colon. There are filling defects in the  partially opacified cecum and ascending colon which probably represent  fecal material. However obscured and adjacent nonobstructing  nodule/mass. There is diverticulosis of the distal colon. There is  moderate diffuse thickening of the wall of the descending and sigmoid  colon. There is a mild surrounding peritumoral infiltration.     There is a right mid abdominal approach draining catheter is distal end  in adjacent and below the right lobe of the liver.     Atheromatous changes of the abdominal aorta and iliac arteries. No  aneurysmal dilatation.     There is no evidence of dominant or pelvic lymphadenopathy.     There is a mild peritumoral infiltration without significant nodules or  masses. This may be due to the recent laparoscopy. No free fluid or free  air.     Images reviewed in bone window show no acute  bony abnormality or a bone  lesion. Chronic degenerative changes of the lumbar spine are seen with a  mild dextroscoliosis.       Impression:      1. A nasogastric tube in place. Decompression of the stomach since the  previous study. A drainage tube in place.  2. Moderate thickening of the wall/mucosa of the gastric antrum and  duodenal bulb the may represent an inflammatory process as suggested in  the history. No leakage of the contrast material in the area.  3. Thickening of the wall of the descending and sigmoid colon with  surrounding peritumoral infiltration may represent acute colitis. This  could partly be due to incomplete distention.  4. The diverticulosis of the distal colon. No evidence for  diverticulitis.  5. Atelectatic changes and a small bibasal pleural effusion.  6. Small air in the origin of the plantar may be due to recent  instrumentation or inflammatory/infectious process.  7. Low density nodule the liver and spleen are stable and probably  represent cysts.                                   This report was finalized on 06/27/2022 12:03 by Dr. Byron Delgadillo MD.    XR Chest 1 View [[id number]] Collected: 06/27/22 0711     Updated: 06/27/22 0716    Narrative:      EXAM/TECHNIQUE: XR CHEST 1 VW-     INDICATION: shortness of air; I95.9-Hypotension, unspecified;  Z74.09-Other reduced mobility; Z78.9-Other specified health status     COMPARISON: 6/23/2022     FINDINGS:     Interval extubation. RIGHT IJ CVL tip overlies the SVC. Enteric tube tip  projects over the GE junction. Cardiac silhouette is within normal  limits. Bibasilar opacities have significantly decreased. No new  airspace opacity. No pleural effusion or visible pneumothorax. [place]  project over the RIGHT upper abdomen.       Impression:         1.  Interval extubation. CVL appears in good position. Enteric tube tip  projects over the GE junction, recommend advancement.  2.  Interval decrease in bibasilar opacities.  This report was  finalized on 06/27/2022 07:13 by Dr. Vance Segundo MD.    XR Chest 1 View [[id number]] Collected: 06/23/22 0726     Updated: 06/23/22 0730    Narrative:      EXAM/TECHNIQUE: XR CHEST 1 VW-     INDICATION: Ventilated patient, hypotension     COMPARISON: 6/21/2022     FINDINGS:     Endotracheal tube is 4.8 cm above the vladimir. Enteric tube terminates  below the diaphragm out of the field of view. RIGHT IJ CVL tip overlies  the cavoatrial junction.     Cardiac silhouette is stable. No change in RIGHT infrahilar and LEFT  lower lobe airspace opacities. No large pleural effusion. No visible  pneumothorax.       Impression:         No change in appearance of the chest.  This report was finalized on 06/23/2022 07:27 by Dr. Vance Segundo MD.    XR Chest 1 View [[id number]] Collected: 06/21/22 0725     Updated: 06/21/22 0731    Narrative:      EXAM/TECHNIQUE: XR CHEST 1 VW-     INDICATION: ETT in place; I95.9-Hypotension, unspecified     COMPARISON: 6/20/2022     FINDINGS:     Interval intubation with endotracheal tube 4 cm above the vladimir.  RIGHT IJ CVL with tip overlying the cavoatrial junction.  Enteric tube terminates below the diaphragm out of the field of view.     Cardiac silhouette is borderline prominent but stable allowing for  differences in lung volumes and positioning. Mild central vascular  congestion is suspected. No pleural effusion or pneumothorax. New mild  LEFT basilar opacity, likely atelectasis. No acute osseous finding.       Impression:         Support lines/tubes appear in good position. Suspect mild LEFT basilar  atelectasis.  This report was finalized on 06/21/2022 07:28 by Dr. Vance Segundo MD.    CT Angiogram Abdomen Pelvis [[id number]] Collected: 06/20/22 0823     Updated: 06/20/22 0840    Narrative:      EXAMINATION: CT ANGIOGRAM ABDOMEN PELVIS-      6/20/2022 1:37 AM CDT     HISTORY: Aortic aneurysm (AAA), surveillance     In order to have a CT radiation dose as low as reasonably  achievable  Automated Exposure Control was utilized for adjustment of the mA and/or  KV according to patient size.     DLP in mGycm= 422     CT angiography of the abdomen and pelvis are performed after intravenous  contrast and is patent. Images are acquired in axial plane and  subsequent 2-D reconstructions coronal and sagittal planes and 3-D  maximum intensity projection reconstruction.     There is no previous study for comparison.     Atheromatous changes of the abdominal aorta and iliac arteries are seen.  No aneurysmal dilatation or dissection.     There is a mild narrowing of the origin of the celiac trunk with  moderate poststenotic dilatation. The maximal lumen measures 1.1 cm.  There is subsequent normal branches.     There is long segment noncalcific plaque along the inferior margin of  the proximal superior mesenteric artery. No significant stenosis. Normal  branches.     Calcific plaques are seen at the origin of the left renal artery. No  stenosis. Normal origin course and caliber of the right renal artery  seen.     A calcific plaque is seen at the origin of the inferior mesenteric  artery. 50% diameter stenosis. The remaining inferior mesenteric artery  appears unremarkable with subsequent normal branches.     Atheromatous changes of both common internal and external iliac arteries  is seen. No significant stenosis or aneurysmal dilatation.     Normal sized common femoral arteries are seen dividing into normal  superficial and deep femoral arteries.     The lung bases included in the study appears unremarkable. There is a  small subpleural nodule within left lower lobe, image #4 in series 4  which may represent a small granuloma?.     There are several well-defined low-density nodules in the liver which  are suboptimally and incompletely evaluated in this study. These may  represent hepatic cysts. Spleen is normal.     Pancreas appears normal.     The adrenal glands bilaterally are normal.      Moderate lobulation and renal contour bilaterally seen. There are  several well-defined sharply marginated low density nodules in both  kidneys which are incompletely evaluated in this study. The largest  nodule located posteriorly in the upper pole of the left kidney measures  1.9 cm. CT density suggests a cyst. No radiopaque calculi in either  kidney. No hydronephrosis. Limited visualized ureters are unremarkable.  Urinary bladder is poorly distended. No intrinsic abnormalities.     Prostate is moderately enlarged with intrinsic calcification. There are  small metallic densities in the prostate which may represent  radiotherapy seeds     The stomach is significantly distended with ingested material fluid and  gas. The duodenum and small bowel are normal. Normal retrocecal appendix  is seen. Large volume stool is seen in the colon. No findings to suggest  obstruction. There is diverticulosis of the colon more pronounced in the  sigmoid colon. No findings to suggest diverticulitis.     There is no evidence of abdominal or pelvic lymphadenopathy.     Images are reviewed in bone window show chronic degenerative changes of  the lumbar spine. Severe degeneration of intervertebral disc at L2-3,  L3-4 and L5-S1. There is a mild dextroscoliosis. No focal bony lesions  noted.       Impression:      1. Atheromatous changes of the abdominal aorta iliac and femoral  arteries. No aneurysmal dilatation or dissection. No significant  stenosis.  2. Several low density nodule in the liver and both kidneys which are  incompletely evaluated in this study. These probably represent cysts.  Further follow-up may be obtained.  3. Diverticulosis of the distal colon. No evidence of acute  diverticulitis.     The above study was initially reviewed and reported by [facility]. I do not  find any discrepancies.  This report was finalized on 06/20/2022 08:37 by Dr. Byron Delgadillo MD.    CT Angiogram Chest [[id number]] Collected: 06/20/22 0818      Updated: 06/20/22 0828    Narrative:      EXAMINATION: CT ANGIOGRAM CHEST- 6/20/2022 8:18 AM CDT     HISTORY: Acute aortic syndrome (AAS) suspected, aortic aneurysm  surveillance.     DOSE: 422 mGycm (Automatic exposure control technique was implemented in  an effort to keep the radiation dose as low as possible without  compromising image quality)     REPORT: Spiral CT of the chest was performed after administration of  intravenous contrast from the thoracic inlet through the upper abdomen  using CTA protocol. Reconstructed 3-D, coronal and sagittal images were  also reviewed.     Comparison: There are no correlative imaging studies for comparison.     The contrast bolus is satisfactory. The central pulmonary arteries are  normal caliber and no filling defects are seen in the pulmonary  arteries. Heart size is normal. No evidence of right heart strain is  identified. There is ectasia of the ascending aorta, with a maximum  diameter 3.8 cm and no evidence of dissection. A small volume of  calcified plaque is noted within the aortic arch. At the arch level, the  aorta has a maximum diameter 2.7 cm in the descending thoracic aorta has  a maximum diameter 2.8 cm. There is a low-attenuation mass in the right  thyroid lobe measuring 3.4 cm. Follow-up ultrasound is recommended. No  intrathoracic lymphadenopathy is identified. There is no pneumothorax.  Review of lung windows demonstrates a few scattered calcified granulomas  and there is mild bibasilar atelectasis. No evidence of pneumonia is  identified. There is a 3 mm noncalcified nodule in the left lower lobe  image 117 series 4, most likely benign. There are several small cysts  within the liver, largest is in the left lobe measures 1.7 cm, axial  image 150. A large amount ingested food stuff is noted in the stomach.  There are benign-appearing cysts at the upper pole the left kidney. This  includes a cyst that measures 1.9 cm. Review of bone windows shows no  acute  abnormality, moderate degenerative changes are noted in the  midthoracic and upper lumbar spine.       Impression:      1. No acute intrathoracic abnormality is identified. No evidence of  pulmonary embolism or aortic dissection. There is ectasia of the  ascending aorta, with a maximum diameter of 3.8 cm. No aortic aneurysm  is identified. Small volume of calcified plaque within the thoracic  aortic arch and the LAD coronary artery distribution.  2. Probably benign 3 mm noncalcified pulmonary nodules in the left lower  lobe. There is evidence of healed granulomatous disease.  3. Follow-up ultrasound of the thyroid gland is recommended to evaluate  the 3.4 cm low-attenuation mass in the right thyroid lobe.  4. Hepatic cysts and renal cysts at the upper pole of the left kidney.     A preliminary report was provided by the [facility] radiology service.                 This report was finalized on 06/20/2022 08:25 by Dr. Edmund Escudero MD.    XR Chest 1 View [[id number]] Collected: 06/20/22 0708     Updated: 06/20/22 0714    Narrative:      EXAM/TECHNIQUE: XR CHEST 1 VW-     INDICATION: weakness     COMPARISON: 6/14/2022     FINDINGS:     Cardiomediastinal silhouette is within normal limits. No pleural  effusion or pneumothorax. No focal consolidation. No acute osseous  findings.       Impression:         No acute findings.   This report was finalized on 06/20/2022 07:11 by Dr. Vance Segundo MD.          LAB RESULTS:      Lab 07/05/22  0552 07/04/22  0607 07/02/22 0519 06/30/22  0537   WBC 9.93 11.54* 14.81* 11.97*   HEMOGLOBIN 9.7* 9.0* 8.6* 8.1*   HEMATOCRIT 29.5* 28.6* 26.2* 25.7*   PLATELETS 336 362 349 301   NEUTROS ABS  --   --   --  7.87*   IMMATURE GRANS (ABS)  --   --   --  0.47*   LYMPHS ABS  --   --   --  2.05   MONOS ABS  --   --   --  0.83   EOS ABS  --   --   --  0.71*   MCV 88.1 89.4 88.8 92.1         Lab 07/05/22  0552 07/04/22  0607 07/02/22  0519 07/01/22 0514 06/30/22  0537   SODIUM 133* 133* 135* 136  135*   POTASSIUM 4.3 4.0 3.9 3.9 3.5   CHLORIDE 98 100 102 103 101   CO2 23.0 25.0 26.0 23.0 27.0   ANION GAP 12.0 8.0 7.0 10.0 7.0   BUN 16 17 21 23 31*   CREATININE 0.92 0.84 0.82 0.68* 0.79   EGFR 83.1 87.1 87.7 92.8 88.7   GLUCOSE 86 95 85 92 112*   CALCIUM 8.8 9.0 9.0 8.6 8.4*   MAGNESIUM  --  1.8 1.8 1.9 1.9   PHOSPHORUS  --  3.0 2.8 2.6 2.2*                         Brief Urine Lab Results  (Last result in the past 365 days)      Color   Clarity   Blood   Leuk Est   Nitrite   Protein   CREAT   Urine HCG        06/27/22 0523 Dark Yellow   Clear   Trace   Negative   Negative   Trace               Microbiology Results (last 10 days)     Procedure Component Value - Date/Time    Clostridium Difficile Toxin - Stool, Per Rectum [[id number]]  (Abnormal) Collected: 06/27/22 1639    Lab Status: Final result Specimen: Stool from Per Rectum Updated: 06/27/22 1850    Narrative:      The following orders were created for panel order Clostridium Difficile Toxin - Stool, Per Rectum.  Procedure                               Abnormality         Status                     ---------                               -----------         ------                     Clostridium Difficile To...[[id number]]  Abnormal            Final result                 Please view results for these tests on the individual orders.    Clostridium Difficile Toxin, PCR - Stool, Per Rectum [[id number]]  (Abnormal) Collected: 06/27/22 1639    Lab Status: Final result Specimen: Stool from Per Rectum Updated: 06/27/22 1850     C. Difficile Toxins by PCR Positive    Narrative:      Performance characteristics of test not established for patients <2 years of age.    COVID PRE-OP / PRE-PROCEDURE SCREENING ORDER (NO ISOLATION) - Swab, Nasal Cavity [[id number]]  (Normal) Collected: 06/27/22 1141    Lab Status: Final result Specimen: Swab from Nasal Cavity Updated: 06/27/22 1223    Narrative:      The following orders were created for panel order COVID PRE-OP /  PRE-PROCEDURE SCREENING ORDER (NO ISOLATION) - Swab, Nasal Cavity.  Procedure                               Abnormality         Status                     ---------                               -----------         ------                     COVID-19,[facility] [employer] IN-JULI...[[id number]]  Normal              Final result                 Please view results for these tests on the individual orders.    COVID-19,[facility] [employer] IN-HOUSE,Nasal Swab No Transport Media 3-4 HR TAT - Swab, Nasal Cavity [[id number]]  (Normal) Collected: 06/27/22 1141    Lab Status: Final result Specimen: Swab from Nasal Cavity Updated: 06/27/22 1223     COVID19 Not Detected    Narrative:      Fact sheet for providers: https://www.fda.gov/media/550542/download     Fact sheet for patients: https://www.fda.gov/media/290425/download    Test performed by PCR.    Consider negative results in combination with clinical observations, patient history, and epidemiological information.  Fact sheet for providers: https://www.fda.gov/media/192767/download     Fact sheet for patients: https://www.fda.gov/media/351940/download    Test performed by PCR.    Consider negative results in combination with clinical observations, patient history, and epidemiological information.    Blood Culture With DENNISE - Blood, Blood, Central Line [[id number]]  (Normal) Collected: 06/27/22 1011    Lab Status: Final result Specimen: Blood, Central Line Updated: 07/02/22 1048     Blood Culture No growth at 5 days    Blood Culture With DENNISE - Blood, Hand, Left [[id number]]  (Normal) Collected: 06/27/22 1001    Lab Status: Final result Specimen: Blood from Hand, Left Updated: 07/02/22 1032     Blood Culture No growth at 5 days        Chief Complaint on Day of Discharge: Adamantly refuses rehabilitation placement and wants to go home    History of Present Illness on Day of Discharge: Feeling better, tolerating current diet with some displeasure. States has ambulated without difficulty and can take care  "of himself at home.      Hospital Course:  The patient is a 82 y.o. male who presented to [facility] with hypotension, melena and diarrhea. RBC, FFP and platelets were transfuse and he was evaluated promptly by GI with EGD and found to have a duodenal ulcer with arterial bleeding. Attempts to control the bleeding with endoscopy were unsuccessful and he was immediately transferred to the OR. He underwent gastrotomy/duodenotomy with suture repair of the bleeding ulcer.  Post op hemoglobin has remained stable around 9, no further bleeding noted.    Also he tested positive for C diff antigen and was treated with oral vancomycin. He can complete this treatment at home. Vancomycin was switched to Flagyl due to insurance costs.     Patient agreed to consider subacute rehabilitation yesterday. He will benefit from placement for short term.     Condition on Discharge:  Stable    Physical Exam on Discharge:  /62 (BP Location: Right arm, Patient Position: Sitting)   Pulse 69   Temp 98.3 °F (36.8 °C) (Oral)   Resp 16   Ht 180.3 cm (71\")   Wt 83.1 kg (183 lb 3.2 oz)   SpO2 91%   BMI 25.55 kg/m²   Physical Exam  Constitutional:       Appearance: He is well-developed. He is not ill-appearing or diaphoretic.   HENT:      Head: Normocephalic and atraumatic.      Right Ear: External ear normal.      Left Ear: External ear normal.      Nose: Nose normal.      Mouth/Throat:      Mouth: Mucous membranes are dry.   Eyes:      General:         Right eye: No discharge.         Left eye: No discharge.      Extraocular Movements: Extraocular movements intact.      Conjunctiva/sclera: Conjunctivae normal.      Pupils: Pupils are equal, round, and reactive to light.   Neck:      Vascular: No JVD.   Cardiovascular:      Rate and Rhythm: Regular rhythm. Tachycardia present.      Heart sounds: Normal heart sounds. No murmur heard.  Pulmonary:      Effort: Pulmonary effort is normal. No respiratory distress.      Breath " sounds: Normal breath sounds. No wheezing or rales.   Chest:      Chest wall: No tenderness.   Abdominal:      General: Bowel sounds are normal. There is no distension.      Palpations: Abdomen is soft.      Tenderness: There is no abdominal tenderness. There is no guarding or rebound.   Musculoskeletal:         General: No tenderness or deformity. Normal range of motion.      Cervical back: Normal range of motion and neck supple. No rigidity.      Right lower leg: No edema.      Left lower leg: No edema.   Skin:     General: Skin is warm and dry.      Findings: No rash.   Neurological:      General: No focal deficit present.      Mental Status: He is alert and oriented to person, place, and time. Mental status is at baseline.      Cranial Nerves: No cranial nerve deficit.      Sensory: No sensory deficit.      Motor: No abnormal muscle tone.      Deep Tendon Reflexes: Reflexes normal.   Psychiatric:         Mood and Affect: Mood normal.         Behavior: Behavior normal.     Discharge Disposition:  SNF for subacute rehabilitation    Discharge Medications:     Discharge Medications      New Medications      Instructions Start Date   acetaminophen 325 MG tablet  Commonly known as: TYLENOL   650 mg, Oral, Every 6 Hours PRN      pantoprazole 40 MG EC tablet  Commonly known as: PROTONIX   40 mg, Oral, 2 Times Daily Before Meals      saccharomyces boulardii 250 MG capsule  Commonly known as: Florastor   250 mg, Oral, 2 Times Daily      vancomycin 125 MG capsule  Commonly known as: VANCOCIN   125 mg, Oral, Every 6 Hours Scheduled         Continue These Medications      Instructions Start Date   fenofibrate 145 MG tablet  Commonly known as: TRICOR   145 mg, Oral, Daily      hydroCHLOROthiazide 25 MG tablet  Commonly known as: HYDRODIURIL   25 mg, Oral, Daily      lisinopril 40 MG tablet  Commonly known as: PRINIVIL,ZESTRIL   40 mg, Oral, Daily           Discharge Diet:   Gastric soft to regular     Activity at Discharge:    Resume usual activity    Follow-up Appointments:   PCP in 2-4weeks    Electronically signed by Randy Kirk MD, 07/06/22, 13:41 CDT.    Time: 35 minutes

## 2022-07-06 NOTE — CASE MANAGEMENT/SOCIAL WORK
Continued Stay Note  [facility]     Patient Name: Echo Temple  MRN: [id number]  Today's Date: 7/6/2022    Admit Date: 6/19/2022     Discharge Plan     Row Name 07/06/22 1604       Plan    Plan Home with home health    Plan Comments Dr. Perez did the peer to peer and insurance has denied placement. Informed pt's stepdaughter Aprilla [phone] and she is not sure how pt can be taken care of at home. Discussed snf with private pay, assisted living, and sitters. She is going to contact his family for assistance.    Addendum: Pt is going home today. Aprilla is contacting pt's niece to pick him up. Requested that the niece call this sw to work toward arranging home health.    Row Name 07/06/22 1401       Plan    Plan [place] pending precert    Patient/Family in Agreement with Plan yes    Plan Comments Insurance is requesting a peer to peer. [phone], Option 5. Deadline 0900 tomorrow. Reference number [id number]. Informed Dr. Kirk.               Discharge Codes    No documentation.               Expected Discharge Date and Time     Expected Discharge Date Expected Discharge Time    Jul 6, 2022             SANJANA Marinelli

## 2022-07-06 NOTE — PLAN OF CARE
"Goal Outcome Evaluation:  Plan of Care Reviewed With: patient        Progress: improving  Outcome Evaluation: PT Re-Eval complete. A&Ox4. Pt was in chair upon arrival and willing to participate in therapy. Pt c/o no pain and stated he \"was ready to go home at the earliest possible time\". Sensation intact. BLE MMT strength grossly 4+/5. Pt performed STS transfer with SBAx1 and stabilized with walker. Pt ambulated 250 ft in hallway with SBAx1 and walker before returning to chair to end session. No LOB or falls were observed. Pt will continue to benefit from skilled PT intervention to promote increased independence when ambulating as well as decrease falls risk/improve strength. Recommend d.c to home with home health pending progress.  "

## 2022-07-06 NOTE — PLAN OF CARE
Goal Outcome Evaluation:  Plan of Care Reviewed With: patient        Progress: no change  Outcome Evaluation: OT tx completed. Pt reports no pain. Pt is agreeable to therapy. Pt was SBA for t/f. Pt was SBA-CGA with RW for functional mobility. Pt was SBA for donning/doffing socks. Pt's BUE strength 4+/5 and BUE ROM WFL. Pt would benefit from continued OT to address adls, functional mobility and endurance. Recommended d/c home with assist and HH.

## 2022-07-06 NOTE — THERAPY RE-EVALUATION
Patient Name: Echo Temple  : 1940    MRN: [id number]                              Today's Date: 2022       Admit Date: 2022    Visit Dx:     ICD-10-CM ICD-9-CM   1. Hypotension, unspecified hypotension type  I95.9 458.9   2. Impaired mobility  Z74.09 799.89   3. Decreased activities of daily living (ADL)  Z78.9 V49.89   4. Dysphagia, unspecified type  R13.10 787.20     Patient Active Problem List   Diagnosis   • Acute blood loss anemia   • Diarrhea     Past Medical History:   Diagnosis Date   • Cancer (HCC)    • Hypertension      Past Surgical History:   Procedure Laterality Date   • ENDOSCOPY N/A 2022    Procedure: ESOPHAGOGASTRODUODENOSCOPY WITH ANESTHESIA;  Surgeon: Rene Brice MD;  Location: [facility] ENDOSCOPY;  Service: Gastroenterology;  Laterality: N/A;  pre anemia   post duodenal ulcer   Dr. Sam   • EXPLORATORY LAPAROTOMY N/A 2022    Procedure: LAPAROTOMY EXPLORATORY;  Surgeon: Nancy Park MD;  Location: [facility] OR;  Service: General;  Laterality: N/A;   • HEMORRHOIDECTOMY        General Information     Row Name 22 1325          OT Time and Intention    Document Type re-evaluation  -MM     Mode of Treatment occupational therapy  -MM     Row Name 22 132          General Information    Patient Profile Reviewed yes  -MM     Prior Level of Function independent:;all household mobility;community mobility;ADL's  -MM     Existing Precautions/Restrictions fall  -MM     Barriers to Rehab medically complex  -MM     Row Name 22 1325          Living Environment    People in Home spouse  -MM     Row Name 22 1325          Home Main Entrance    Number of Stairs, Main Entrance four  -MM     Stair Railings, Main Entrance railings on both sides of stairs  -MM     Row Name 22 1325          Stairs Within Home, Primary    Number of Stairs, Within Home, Primary none  -MM     Stair Railings, Within Home, Primary none  -MM     Row Name 22 1325           Cognition    Orientation Status (Cognition) oriented to;person;place;situation;time  -MM     Row Name 07/06/22 1325          Safety Issues, Functional Mobility    Impairments Affecting Function (Mobility) balance;endurance/activity tolerance  -MM           User Key  (r) = Recorded By, (t) = Taken By, (c) = Cosigned By    Initials Name Provider Type    Angus Roque, OTR/L Occupational Therapist                 Mobility/ADL's     Row Name 07/06/22 1325          Bed Mobility    Comment, (Bed Mobility) up in chair  -MM     Row Name 07/06/22 1325          Transfers    Transfers sit-stand transfer;stand-sit transfer  -MM     Sit-Stand [place] (Transfers) standby assist;verbal cues  -MM     Stand-Sit [place] (Transfers) standby assist;verbal cues  -MM     [facility] Name 07/06/22 1325          Sit-Stand Transfer    Assistive Device (Sit-Stand Transfers) walker, front-wheeled  -MM     Row Name 07/06/22 1325          Stand-Sit Transfer    Assistive Device (Stand-Sit Transfers) walker, front-wheeled  -MM     Row Name 07/06/22 1325          Functional Mobility    Functional Mobility- Ind. Level contact guard assist;standby assist  -MM     Functional Mobility- Device walker, front-wheeled  -MM     Row Name 07/06/22 1325          Activities of Daily Living    BADL Assessment/Intervention lower body dressing  -MM     Row Name 07/06/22 1325          Lower Body Dressing Assessment/Training    [place] Level (Lower Body Dressing) don;doff;socks;supervision  -MM     Position (Lower Body Dressing) unsupported sitting  -MM           User Key  (r) = Recorded By, (t) = Taken By, (c) = Cosigned By    Initials Name Provider Type    Angus Roque, OTR/L Occupational Therapist               Obj/Interventions     Row Name 07/06/22 1325          Sensory Assessment (Somatosensory)    Sensory Assessment (Somatosensory) UE sensation intact  -[facility] Name 07/06/22 1325          Range of Motion Comprehensive    General  Range of Motion bilateral upper extremity ROM WFL  -MM     Row Name 07/06/22 1325          Strength Comprehensive (MMT)    Comment, General Manual Muscle Testing (MMT) Assessment BUE 4+/5  -MM     Row Name 07/06/22 1325          Motor Skills    Motor Skills coordination  -MM     Coordination WFL;bilateral;upper extremity  -MM           User Key  (r) = Recorded By, (t) = Taken By, (c) = Cosigned By    Initials Name Provider Type    MM Angus Evans, OTR/L Occupational Therapist               Goals/Plan     Row Name 07/06/22 1325          Transfer Goal 1 (OT)    Activity/Assistive Device (Transfer Goal 1, OT) transfers, all;toilet  -MM     [place] Level/Cues Needed (Transfer Goal 1, OT) independent  -MM     Time Frame (Transfer Goal 1, OT) long term goal (LTG);by discharge  -MM     Progress/Outcome (Transfer Goal 1, OT) goal revised this date  -MM     Row Name 07/06/22 1325          Bathing Goal 1 (OT)    Activity/Device (Bathing Goal 1, OT) bathing skills, all;shower chair  -MM     [place] Level/Cues Needed (Bathing Goal 1, OT) standby assist  -MM     Time Frame (Bathing Goal 1, OT) long term goal (LTG);30 days  -MM     Progress/Outcomes (Bathing Goal 1, OT) goal ongoing  -MM     [facility] Name 07/06/22 1325          Toileting Goal 1 (OT)    Activity/Device (Toileting Goal 1, OT) toileting skills, all  -MM     [place] Level/Cues Needed (Toileting Goal 1, OT) independent  -MM     Time Frame (Toileting Goal 1, OT) long term goal (LTG);30 days  -MM     Progress/Outcome (Toileting Goal 1, OT) goal revised this date  -[facility] Name 07/06/22 1325          Therapy Assessment/Plan (OT)    Planned Therapy Interventions (OT) activity tolerance training;BADL retraining;functional balance retraining;occupation/activity based interventions;patient/caregiver education/training;strengthening exercise;transfer/mobility retraining  -MM           User Key  (r) = Recorded By, (t) = Taken By, (c) = Cosigned By    Initials  Name Provider Type    Angus Roque, OTR/L Occupational Therapist               Clinical Impression     Row Name 07/06/22 1325          Pain Assessment    Pretreatment Pain Rating 0/10 - no pain  -MM     Posttreatment Pain Rating 0/10 - no pain  -MM     Row Name 07/06/22 1325          Plan of Care Review    Plan of Care Reviewed With patient  -MM     Progress no change  -MM     Outcome Evaluation OT tx completed. Pt reports no pain. Pt is agreeable to therapy. Pt was SBA for t/f. Pt was SBA-CGA with RW for functional mobility. Pt was SBA for donning/doffing socks. Pt's BUE strength 4+/5 and BUE ROM WFL. Pt would benefit from continued OT to address adls, functional mobility and endurance. Recommended d/c home with assist and HH.  -MM     Row Name 07/06/22 1325          Therapy Assessment/Plan (OT)    Patient/Family Therapy Goal Statement (OT) return home  -MM     Rehab Potential (OT) good, to achieve stated therapy goals  -MM     Criteria for Skilled Therapeutic Interventions Met (OT) yes;skilled treatment is necessary  -MM     Therapy Frequency (OT) 3 times/wk  -MM     Predicted Duration of Therapy Intervention (OT) 10 days  -MM     Row Name 07/06/22 1325          Therapy Plan Review/Discharge Plan (OT)    Anticipated Discharge Disposition (OT) home with assist;home with home health  -MM     Row Name 07/06/22 1325          Positioning and Restraints    Pre-Treatment Position sitting in chair/recliner  -MM     Post Treatment Position chair  -MM     In Chair sitting;call light within reach;encouraged to call for assist  -MM           User Key  (r) = Recorded By, (t) = Taken By, (c) = Cosigned By    Initials Name Provider Type    Angus Roque, OTR/L Occupational Therapist               Outcome Measures     Row Name 07/06/22 1325          How much help from another is currently needed...    Putting on and taking off regular lower body clothing? 3  -MM     Bathing (including washing, rinsing, and drying)  3  -MM     Toileting (which includes using toilet bed pan or urinal) 4  -MM     Putting on and taking off regular upper body clothing 4  -MM     Taking care of personal grooming (such as brushing teeth) 4  -MM     Eating meals 4  -MM     AM-PAC 6 Clicks Score (OT) 22  -MM     Row Name 07/06/22 1320          How much help from another person do you currently need...    Turning from your back to your side while in flat bed without using bedrails? 4  -BRIAN (r) BRYAN (t) BRIAN (c)     Moving from lying on back to sitting on the side of a flat bed without bedrails? 4  -BRIAN (r) BRYAN (t) BRIAN (c)     Moving to and from a bed to a chair (including a wheelchair)? 4  -BRIAN (r) BRYAN (t) BRIAN (c)     Standing up from a chair using your arms (e.g., wheelchair, bedside chair)? 4  -BRIAN (r) BRYAN (t) BRIAN (c)     Climbing 3-5 steps with a railing? 3  -BRIAN (r) BRYAN (t) BRIAN (c)     To walk in hospital room? 4  -BRIAN (r) BRYAN (t) BRIAN (c)     AM-PAC 6 Clicks Score (PT) 23  -BRIAN (r) BRYAN (t)     Highest level of mobility 7 --> Walked 25 feet or more  -BRIAN (r) BRYAN (t)     Row Name 07/06/22 1325          Functional Assessment    Outcome Measure Options AM-PAC 6 Clicks Daily Activity (OT)  -MM           User Key  (r) = Recorded By, (t) = Taken By, (c) = Cosigned By    Initials Name Provider Type    Ramakrishna Martinez, PT DPT Physical Therapist    Angus Roque, OTR/L Occupational Therapist    Lokesh Ochoa, PT Student PT Student                Occupational Therapy Education                 Title: PT OT SLP Therapies (Done)     Topic: Occupational Therapy (Done)     Point: ADL training (Done)     Description:   Instruct learner(s) on proper safety adaptation and remediation techniques during self care or transfers.   Instruct in proper use of assistive devices.              Learning Progress Summary           Patient Acceptance, E, VU by MM at 7/6/2022 8764    Comment: OT role, benefits, POC, d/c planning    Acceptance, E,D, VU,NR by  at 7/5/2022 1055     Acceptance, E, VU by AD at 7/1/2022 1430    Acceptance, E,D, VU,NR by  at 6/30/2022 1519    Acceptance, E,D, VU by  at 6/29/2022 1418    Acceptance, E,D, VU,NR by  at 6/27/2022 1415    Acceptance, E, VU by AD at 6/26/2022 1454                   Point: Home exercise program (Done)     Description:   Instruct learner(s) on appropriate technique for monitoring, assisting and/or progressing therapeutic exercises/activities.              Learning Progress Summary           Patient Acceptance, E, VU by AD at 7/1/2022 1430    Acceptance, E,D, VU,NR by  at 6/30/2022 1519    Acceptance, E,D, VU by  at 6/29/2022 1418                   Point: Precautions (Done)     Description:   Instruct learner(s) on prescribed precautions during self-care and functional transfers.              Learning Progress Summary           Patient Acceptance, E, VU by MM at 7/6/2022 1547    Comment: OT role, benefits, POC, d/c planning    Acceptance, E, VU by AD at 7/1/2022 1430    Acceptance, E,D, VU,NR by  at 6/30/2022 1519    Acceptance, E,D, VU by  at 6/29/2022 1418    Acceptance, E, VU by AD at 6/26/2022 1454                   Point: Body mechanics (Done)     Description:   Instruct learner(s) on proper positioning and spine alignment during self-care, functional mobility activities and/or exercises.              Learning Progress Summary           Patient Acceptance, E, VU by MM at 7/6/2022 1547    Comment: OT role, benefits, POC, d/c planning    Acceptance, E, VU by AD at 7/1/2022 1430    Acceptance, E,D, VU,NR by  at 6/30/2022 1519    Acceptance, E,D, VU by  at 6/29/2022 1418                               User Key     Initials Effective Dates Name Provider Type Discipline     06/16/21 -  Suze Abarca, OTR/L Occupational Therapist OT    MM 06/16/21 -  Angus Evans, OTR/L Occupational Therapist OT    MW 08/28/18 -  Abeba Borges, OTR/L Occupational Therapist OT    AD 05/04/22 -  Seema Castellanos, OT Student OT Student  OT              OT Recommendation and Plan  Planned Therapy Interventions (OT): activity tolerance training, BADL retraining, functional balance retraining, occupation/activity based interventions, patient/caregiver education/training, strengthening exercise, transfer/mobility retraining  Therapy Frequency (OT): 3 times/wk  Plan of Care Review  Plan of Care Reviewed With: patient  Progress: no change  Outcome Evaluation: OT tx completed. Pt reports no pain. Pt is agreeable to therapy. Pt was SBA for t/f. Pt was SBA-CGA with RW for functional mobility. Pt was SBA for donning/doffing socks. Pt's BUE strength 4+/5 and BUE ROM WFL. Pt would benefit from continued OT to address adls, functional mobility and endurance. Recommended d/c home with assist and HH.     Time Calculation:    Time Calculation- OT     Row Name 07/06/22 1325             Time Calculation- OT    OT Start Time 1325  -MM      OT Stop Time 1355  -MM      OT Time Calculation (min) 30 min  -MM      Total Timed Code Minutes- OT 30 minute(s)  -MM      OT Received On 07/06/22  -MM      OT Goal Re-Cert Due Date 07/16/22  -MM            User Key  (r) = Recorded By, (t) = Taken By, (c) = Cosigned By    Initials Name Provider Type    Angus Roque OTR/L Occupational Therapist              Therapy Charges for Today     Code Description Service Date Service Provider Modifiers Qty    [id number]  OT RE-EVAL 2 7/6/2022 Angus Evans OTR/L GO 1               RAVI Servin/ADRIEN  7/6/2022

## 2022-07-06 NOTE — CASE MANAGEMENT/SOCIAL WORK
Continued Stay Note  [facility]     Patient Name: Echo Temple  MRN: [id number]  Today's Date: 7/6/2022    Admit Date: 6/19/2022     Discharge Plan     Row Name 07/06/22 1406       Plan    Plan [place] pending precert    Patient/Family in Agreement with Plan yes    Plan Comments Insurance is requesting a peer to peer. [phone], Option 5. Deadline 0900 tomorrow. Reference number [id number]. Informed Dr. Kirk.    Addendum: Have requested Dr. Perez to complete peer to peer.                Discharge Codes    No documentation.               Expected Discharge Date and Time     Expected Discharge Date Expected Discharge Time    Jul 6, 2022             SANJANA Marinelli

## 2022-07-06 NOTE — PLAN OF CARE
Goal Outcome Evaluation:  Plan of Care Reviewed With: patient        Progress: no change  Outcome Evaluation: Pt denies pain; up ad brock; voiding; IID; still awaiting d/c plans; safety maintained.

## 2022-07-06 NOTE — PAYOR COMM NOTE
"Echo Will (82 y.o. Male) CASE [id number]  Plan is d/c today  7/6    [facility] phone   Fax                Date of Birth   1940    Social Security Number       Address   [address] [place] [id number]    Home Phone   [phone]    MRN   [id number]       Rastafari   Anabaptist    Marital Status                               Admission Date   6/19/22    Admission Type   Emergency    Admitting Provider   Randy Kirk MD    Attending Provider   Randy Kirk MD    Department, Room/Bed   [facility] 3C, 376/1       Discharge Date       Discharge Disposition   Long Term Care (DC - External)    Discharge Destination                               Attending Provider: Randy Kirk MD    Allergies: Amoxicillin, Mobic [Meloxicam]    Isolation: Spore   Infection: C.difficile (06/27/22)   Code Status: CPR   Advance Care Planning Activity    Ht: 180.3 cm (71\")   Wt: 83.1 kg (183 lb 3.2 oz)    Admission Cmt: None   Principal Problem: Bleeding duodenal ulcer [K26.4]                 Active Insurance as of 6/19/2022     Primary Coverage     Payor Plan Insurance Group Employer/Plan Group    ANTHEM MEDICARE REPLACEMENT ANTHEM MEDICARE ADVANTAGE [id number]     Payor Plan Address Payor Plan Phone Number Payor Plan Fax Number Effective Dates    [address] [phone]  1/1/2015 - None Entered    [facility] [address]       Subscriber Name Subscriber Birth Date Member ID       ECHO WILL 1940 [id number]                 Emergency Contacts      (Rel.) Home Phone Work Phone Mobile Phone    WiMadelyn estes (Spouse) [phone] -- [phone]    Kylie Reveles (Daughter) [phone] -- --    Tayler Hummel (Other) [phone] -- --               Discharge Summary      Randy Kirk MD at 07/05/22 1114              [facility] Medicine Services  DISCHARGE " SUMMARY       Date of Admission: 6/19/2022  Date of Discharge:  7/5/2022  Primary Care Physician: Tenzin Sam MD    Presenting Problem/Chief Complaint:  Hypotension    Final Discharge Diagnoses:  Hypovolemic shock present on admission due to acute blood loss anemia due to bleeding duodenal ulcer status post surgical repair  Sepsis due to clostridium difficile, improved  Clostridium difficile colitis and diarrhea, improving    Consults: Dr Nancy Jamison general surgery, Dr Rene Brice GI, Dr Jomar Gonzalez pulmonology    Procedures Performed:   6/20/2022 EGD   - Normal gastroesophageal junction and esophagus.  - Mild, benign-appearing esophageal stenosis.  - A small, sliding hiatal hernia was intermittently present during the exam.  - Moderate erosive gastritis in the prepyloric antrum.  - Giant duodenal ulcer at apex of duodenal bulb with arterial bleeding. Multiple modalities were  attempted (bipolar cautery, epinephrine injection, epi spray of ulcer bed, and placement of 2 large clips)  but were unsuccessful. Emergency consultation with general surgeon (Dr. Nancy Park) was  obtained intraoperatively, and he was taken to the OR after the procedure was terminated..  - Normal second portion of the duodenum and third portion of the duodenum.  - The examined portions of the nasopharynx, oropharynx and larynx were normal.  - The examination was otherwise normal.  - No specimens collected.    6/20/2022 (1) Exploratory Laparotomy   (2) Duodenotomy (36646) for exploration   (3) Gastrotomy (15661) with suture repair of bleeding ulcer   (4) Closure of duodenotomy and Gastrostomy     Pertinent Test Results:   Imaging Results (All)     Procedure Component Value Units Date/Time    CT Chest With & Without Contrast Diagnostic [[id number]] Collected: 06/27/22 1234     Updated: 06/27/22 1251    Narrative:      EXAMINATION: CT CHEST W WO CONTRAST DIAGNOSTIC-      6/27/2022 10:32 AM CDT     HISTORY: Pneumonia,  complication suspected, xray done;  I95.9-Hypotension, unspecified; Z74.09-Other reduced mobility;  Z78.9-Other specified health status     In order to have a CT radiation dose as low as reasonably achievable  Automated Exposure Control was utilized for adjustment of the mA and/or  KV according to patient size.     DLP in mGycm= 403     The CT scan of the chest is performed in before and after intravenous  contrast enhancement.     Images are acquired in axial plane and subsequent reconstruction in  coronal and sagittal planes.     Comparison is made with the previous study dated 6/20/2022.     The lungs are poorly expanded.     There are atelectatic changes in the right lower lung and a small  bibasal pleural effusion which was not seen in the previous study.     Previously seen 3 mm nodule in the left lower lobe, image #108 in series  3 is stable.     There is a small pneumatocele in the right posterior costophrenic sulcus  laterally.     The tracheobronchial structures are normal and patent. No endobronchial  nodules. A small diverticulum from the medial aspect of the right  mainstem bronchus is similar to the previous study.     There are groundglass opacity/infiltrate involving the left upper and  left lower lobe which was not seen in the previous study.     A heterogeneous low-density nonenhancing mass in the right lobe of the  thyroid gland is reidentified. It measures 3.2 cm in AP dimension.     There is no axillary lymphadenopathy.     Atheromatous changes thoracic aorta is seen. A mild ectasia of the  ascending thoracic aorta persistent dysplasias 4.3 cm. No dissection.  Atheromatous changes in the coronary arteries are similar to the  previous study.     Normal opacification of pulmonary arteries and branches. No filling  defects.     There is no evidence of mediastinal or hilar mass or lymphadenopathy.     Nasogastric tube is in place.     The abdomen is separately reviewed and reported.     The images  reviewed in bone window show chronic degenerative changes of  the thoracic spine. No focal bony abnormality or a bone lesion.       Impression:      1. Groundglass opacity/infiltrate in the left lung represent an acute  inflammatory/infectious process.  2. Persistent moderate ectasia of the ascending thoracic aorta. No  dissection.  3. Right thyroid nodule is stable.  4. Small bibasal pleural effusion and lower lobar atelectatic changes.                             This report was finalized on 06/27/2022 12:48 by Dr. Byron Delgadillo MD.    CT Abdomen Pelvis With & Without Contrast [[id number]] Collected: 06/27/22 1140     Updated: 06/27/22 1206    Narrative:      EXAMINATION: CT ABDOMEN PELVIS W WO CONTRAST-      6/27/2022 10:32 AM CDT     HISTORY: Abdominal pain, post-op; I95.9-Hypotension, unspecified;  Z74.09-Other reduced mobility; Z78.9-Other specified health status     In order to have a CT radiation dose as low as reasonably achievable  Automated Exposure Control was utilized for adjustment of the mA and/or  KV according to patient size.     DLP in mGycm= 703     The CT scan of the abdomen and pelvis is performed before and after  intravenous contrast and is patent.     The Images are acquired in axial plane and subsequent reconstruction in  coronal and sagittal planes.     Comparison is made with the previous study dated 6/20/2022.     The lung bases included in the study show mild atelectatic changes and a  small bibasal pleural effusion. Atheromatous changes of coronary  arteries are noted.     Unenhanced liver is unremarkable except for low-density nodule in the  left lobe similar to the previous study. The spleen is normal.     Moderately distended gallbladder is seen with a layering high-density  fluid is may represent sludge. No calculi.     Pancreas is normal.     The adrenal glands bilaterally are normal.     Moderate lobulation and renal contour bilaterally. Low-density nodules  in both kidneys are  reidentified. The largest nodule in the upper pole  of the left kidney measures 2.3 cm. The CT density suggests a cyst.  Several tiny nodule the kidneys bilaterally are too small to be further  characterized. No calculi. No hydronephrosis. The ureters are poorly  visualized. No intrinsic abnormality. Small bowel amount of air is seen  in the nondependent part of the urinary bladder. The urinary bladder is  well distended. No calculi.     There is a nasogastric tube in place. The stomach is decompressed  compared with the previous study. There is moderate irregular thickening  of the gastric antrum and duodenal bulb which was not obvious in the  previous study. There is a small diverticulum from the proximal  descending duodenum projectile     Head of the pancreas. There is no evidence of contrast extravasation.  There is moderate retroperitoneal fat infiltration around the descending  duodenum. The small bowel is normal. The appendix are normal. Moderate  gas and stool is seen in the colon. There are filling defects in the  partially opacified cecum and ascending colon which probably represent  fecal material. However obscured and adjacent nonobstructing  nodule/mass. There is diverticulosis of the distal colon. There is  moderate diffuse thickening of the wall of the descending and sigmoid  colon. There is a mild surrounding peritumoral infiltration.     There is a right mid abdominal approach draining catheter is distal end  in adjacent and below the right lobe of the liver.     Atheromatous changes of the abdominal aorta and iliac arteries. No  aneurysmal dilatation.     There is no evidence of dominant or pelvic lymphadenopathy.     There is a mild peritumoral infiltration without significant nodules or  masses. This may be due to the recent laparoscopy. No free fluid or free  air.     Images reviewed in bone window show no acute bony abnormality or a bone  lesion. Chronic degenerative changes of the lumbar  spine are seen with a  mild dextroscoliosis.       Impression:      1. A nasogastric tube in place. Decompression of the stomach since the  previous study. A drainage tube in place.  2. Moderate thickening of the wall/mucosa of the gastric antrum and  duodenal bulb the may represent an inflammatory process as suggested in  the history. No leakage of the contrast material in the area.  3. Thickening of the wall of the descending and sigmoid colon with  surrounding peritumoral infiltration may represent acute colitis. This  could partly be due to incomplete distention.  4. The diverticulosis of the distal colon. No evidence for  diverticulitis.  5. Atelectatic changes and a small bibasal pleural effusion.  6. Small air in the origin of the plantar may be due to recent  instrumentation or inflammatory/infectious process.  7. Low density nodule the liver and spleen are stable and probably  represent cysts.                                   This report was finalized on 06/27/2022 12:03 by Dr. Byron Delgadillo MD.    XR Chest 1 View [[id number]] Collected: 06/27/22 0711     Updated: 06/27/22 0716    Narrative:      EXAM/TECHNIQUE: XR CHEST 1 VW-     INDICATION: shortness of air; I95.9-Hypotension, unspecified;  Z74.09-Other reduced mobility; Z78.9-Other specified health status     COMPARISON: 6/23/2022     FINDINGS:     Interval extubation. RIGHT IJ CVL tip overlies the SVC. Enteric tube tip  projects over the GE junction. Cardiac silhouette is within normal  limits. Bibasilar opacities have significantly decreased. No new  airspace opacity. No pleural effusion or visible pneumothorax. Landy  project over the RIGHT upper abdomen.       Impression:         1.  Interval extubation. CVL appears in good position. Enteric tube tip  projects over the GE junction, recommend advancement.  2.  Interval decrease in bibasilar opacities.  This report was finalized on 06/27/2022 07:13 by Dr. Vance Segundo MD.    XR Chest 1 View  [[id number]] Collected: 06/23/22 0726     Updated: 06/23/22 0730    Narrative:      EXAM/TECHNIQUE: XR CHEST 1 VW-     INDICATION: Ventilated patient, hypotension     COMPARISON: 6/21/2022     FINDINGS:     Endotracheal tube is 4.8 cm above the vladimir. Enteric tube terminates  below the diaphragm out of the field of view. RIGHT IJ CVL tip overlies  the cavoatrial junction.     Cardiac silhouette is stable. No change in RIGHT infrahilar and LEFT  lower lobe airspace opacities. No large pleural effusion. No visible  pneumothorax.       Impression:         No change in appearance of the chest.  This report was finalized on 06/23/2022 07:27 by Dr. Vance Segundo MD.    XR Chest 1 View [[id number]] Collected: 06/21/22 0725     Updated: 06/21/22 0731    Narrative:      EXAM/TECHNIQUE: XR CHEST 1 VW-     INDICATION: ETT in place; I95.9-Hypotension, unspecified     COMPARISON: 6/20/2022     FINDINGS:     Interval intubation with endotracheal tube 4 cm above the vladimir.  RIGHT IJ CVL with tip overlying the cavoatrial junction.  Enteric tube terminates below the diaphragm out of the field of view.     Cardiac silhouette is borderline prominent but stable allowing for  differences in lung volumes and positioning. Mild central vascular  congestion is suspected. No pleural effusion or pneumothorax. New mild  LEFT basilar opacity, likely atelectasis. No acute osseous finding.       Impression:         Support lines/tubes appear in good position. Suspect mild LEFT basilar  atelectasis.  This report was finalized on 06/21/2022 07:28 by Dr. Vance Segundo MD.    CT Angiogram Abdomen Pelvis [[id number]] Collected: 06/20/22 0823     Updated: 06/20/22 0840    Narrative:      EXAMINATION: CT ANGIOGRAM ABDOMEN PELVIS-      6/20/2022 1:37 AM CDT     HISTORY: Aortic aneurysm (AAA), surveillance     In order to have a CT radiation dose as low as reasonably achievable  Automated Exposure Control was utilized for adjustment of the mA and/or  KV  according to patient size.     DLP in mGycm= 422     CT angiography of the abdomen and pelvis are performed after intravenous  contrast and is patent. Images are acquired in axial plane and  subsequent 2-D reconstructions coronal and sagittal planes and 3-D  maximum intensity projection reconstruction.     There is no previous study for comparison.     Atheromatous changes of the abdominal aorta and iliac arteries are seen.  No aneurysmal dilatation or dissection.     There is a mild narrowing of the origin of the celiac trunk with  moderate poststenotic dilatation. The maximal lumen measures 1.1 cm.  There is subsequent normal branches.     There is long segment noncalcific plaque along the inferior margin of  the proximal superior mesenteric artery. No significant stenosis. Normal  branches.     Calcific plaques are seen at the origin of the left renal artery. No  stenosis. Normal origin course and caliber of the right renal artery  seen.     A calcific plaque is seen at the origin of the inferior mesenteric  artery. 50% diameter stenosis. The remaining inferior mesenteric artery  appears unremarkable with subsequent normal branches.     Atheromatous changes of both common internal and external iliac arteries  is seen. No significant stenosis or aneurysmal dilatation.     Normal sized common femoral arteries are seen dividing into normal  superficial and deep femoral arteries.     The lung bases included in the study appears unremarkable. There is a  small subpleural nodule within left lower lobe, image #4 in series 4  which may represent a small granuloma?.     There are several well-defined low-density nodules in the liver which  are suboptimally and incompletely evaluated in this study. These may  represent hepatic cysts. Spleen is normal.     Pancreas appears normal.     The adrenal glands bilaterally are normal.     Moderate lobulation and renal contour bilaterally seen. There are  several well-defined  sharply marginated low density nodules in both  kidneys which are incompletely evaluated in this study. The largest  nodule located posteriorly in the upper pole of the left kidney measures  1.9 cm. CT density suggests a cyst. No radiopaque calculi in either  kidney. No hydronephrosis. Limited visualized ureters are unremarkable.  Urinary bladder is poorly distended. No intrinsic abnormalities.     Prostate is moderately enlarged with intrinsic calcification. There are  small metallic densities in the prostate which may represent  radiotherapy seeds     The stomach is significantly distended with ingested material fluid and  gas. The duodenum and small bowel are normal. Normal retrocecal appendix  is seen. Large volume stool is seen in the colon. No findings to suggest  obstruction. There is diverticulosis of the colon more pronounced in the  sigmoid colon. No findings to suggest diverticulitis.     There is no evidence of abdominal or pelvic lymphadenopathy.     Images are reviewed in bone window show chronic degenerative changes of  the lumbar spine. Severe degeneration of intervertebral disc at L2-3,  L3-4 and L5-S1. There is a mild dextroscoliosis. No focal bony lesions  noted.       Impression:      1. Atheromatous changes of the abdominal aorta iliac and femoral  arteries. No aneurysmal dilatation or dissection. No significant  stenosis.  2. Several low density nodule in the liver and both kidneys which are  incompletely evaluated in this study. These probably represent cysts.  Further follow-up may be obtained.  3. Diverticulosis of the distal colon. No evidence of acute  diverticulitis.     The above study was initially reviewed and reported by [facility]. I do not  find any discrepancies.  This report was finalized on 06/20/2022 08:37 by Dr. Byron Delgadillo MD.    CT Angiogram Chest [[id number]] Collected: 06/20/22 0818     Updated: 06/20/22 0828    Narrative:      EXAMINATION: CT ANGIOGRAM CHEST- 6/20/2022  8:18 AM CDT     HISTORY: Acute aortic syndrome (AAS) suspected, aortic aneurysm  surveillance.     DOSE: 422 mGycm (Automatic exposure control technique was implemented in  an effort to keep the radiation dose as low as possible without  compromising image quality)     REPORT: Spiral CT of the chest was performed after administration of  intravenous contrast from the thoracic inlet through the upper abdomen  using CTA protocol. Reconstructed 3-D, coronal and sagittal images were  also reviewed.     Comparison: There are no correlative imaging studies for comparison.     The contrast bolus is satisfactory. The central pulmonary arteries are  normal caliber and no filling defects are seen in the pulmonary  arteries. Heart size is normal. No evidence of right heart strain is  identified. There is ectasia of the ascending aorta, with a maximum  diameter 3.8 cm and no evidence of dissection. A small volume of  calcified plaque is noted within the aortic arch. At the arch level, the  aorta has a maximum diameter 2.7 cm in the descending thoracic aorta has  a maximum diameter 2.8 cm. There is a low-attenuation mass in the right  thyroid lobe measuring 3.4 cm. Follow-up ultrasound is recommended. No  intrathoracic lymphadenopathy is identified. There is no pneumothorax.  Review of lung windows demonstrates a few scattered calcified granulomas  and there is mild bibasilar atelectasis. No evidence of pneumonia is  identified. There is a 3 mm noncalcified nodule in the left lower lobe  image 117 series 4, most likely benign. There are several small cysts  within the liver, largest is in the left lobe measures 1.7 cm, axial  image 150. A large amount ingested food stuff is noted in the stomach.  There are benign-appearing cysts at the upper pole the left kidney. This  includes a cyst that measures 1.9 cm. Review of bone windows shows no  acute abnormality, moderate degenerative changes are noted in the  midthoracic and upper  lumbar spine.       Impression:      1. No acute intrathoracic abnormality is identified. No evidence of  pulmonary embolism or aortic dissection. There is ectasia of the  ascending aorta, with a maximum diameter of 3.8 cm. No aortic aneurysm  is identified. Small volume of calcified plaque within the thoracic  aortic arch and the LAD coronary artery distribution.  2. Probably benign 3 mm noncalcified pulmonary nodules in the left lower  lobe. There is evidence of healed granulomatous disease.  3. Follow-up ultrasound of the thyroid gland is recommended to evaluate  the 3.4 cm low-attenuation mass in the right thyroid lobe.  4. Hepatic cysts and renal cysts at the upper pole of the left kidney.     A preliminary report was provided by the [facility] radiology service.                 This report was finalized on 06/20/2022 08:25 by Dr. Edmund Escudero MD.    XR Chest 1 View [[id number]] Collected: 06/20/22 0708     Updated: 06/20/22 0714    Narrative:      EXAM/TECHNIQUE: XR CHEST 1 VW-     INDICATION: weakness     COMPARISON: 6/14/2022     FINDINGS:     Cardiomediastinal silhouette is within normal limits. No pleural  effusion or pneumothorax. No focal consolidation. No acute osseous  findings.       Impression:         No acute findings.   This report was finalized on 06/20/2022 07:11 by Dr. Vance Segundo MD.          LAB RESULTS:      Lab 07/05/22  0552 07/04/22  0607 07/02/22  0519 06/30/22  0537 06/29/22  0513   WBC 9.93 11.54* 14.81* 11.97* 14.88*   HEMOGLOBIN 9.7* 9.0* 8.6* 8.1* 8.3*   HEMATOCRIT 29.5* 28.6* 26.2* 25.7* 26.2*   PLATELETS 336 362 349 301 279   NEUTROS ABS  --   --   --  7.87* 10.88*   IMMATURE GRANS (ABS)  --   --   --  0.47* 0.43*   LYMPHS ABS  --   --   --  2.05 1.78   MONOS ABS  --   --   --  0.83 0.91*   EOS ABS  --   --   --  0.71* 0.82*   MCV 88.1 89.4 88.8 92.1 91.3         Lab 07/05/22  0552 07/04/22  0607 07/02/22  0519 07/01/22  0514 06/30/22  0537 06/29/22  0513   SODIUM 133* 133* 135*  136 135* 133*   POTASSIUM 4.3 4.0 3.9 3.9 3.5 3.5   CHLORIDE 98 100 102 103 101 100   CO2 23.0 25.0 26.0 23.0 27.0 25.0   ANION GAP 12.0 8.0 7.0 10.0 7.0 8.0   BUN 16 17 21 23 31* 39*   CREATININE 0.92 0.84 0.82 0.68* 0.79 0.77   EGFR 83.1 87.1 87.7 92.8 88.7 89.4   GLUCOSE 86 95 85 92 112* 114*   CALCIUM 8.8 9.0 9.0 8.6 8.4* 8.2*   MAGNESIUM  --  1.8 1.8 1.9 1.9 1.9   PHOSPHORUS  --  3.0 2.8 2.6 2.2*  --          Lab 06/29/22 0513   TOTAL PROTEIN 5.2*   ALBUMIN 2.80*   GLOBULIN 2.4   ALT (SGPT) 28   AST (SGOT) 27   BILIRUBIN 0.3   ALK PHOS 125*                     Brief Urine Lab Results  (Last result in the past 365 days)      Color   Clarity   Blood   Leuk Est   Nitrite   Protein   CREAT   Urine HCG        06/27/22 0523 Dark Yellow   Clear   Trace   Negative   Negative   Trace               Microbiology Results (last 10 days)     Procedure Component Value - Date/Time    Clostridium Difficile Toxin - Stool, Per Rectum [[id number]]  (Abnormal) Collected: 06/27/22 1639    Lab Status: Final result Specimen: Stool from Per Rectum Updated: 06/27/22 1850    Narrative:      The following orders were created for panel order Clostridium Difficile Toxin - Stool, Per Rectum.  Procedure                               Abnormality         Status                     ---------                               -----------         ------                     Clostridium Difficile To...[[id number]]  Abnormal            Final result                 Please view results for these tests on the individual orders.    Clostridium Difficile Toxin, PCR - Stool, Per Rectum [[id number]]  (Abnormal) Collected: 06/27/22 1639    Lab Status: Final result Specimen: Stool from Per Rectum Updated: 06/27/22 1850     C. Difficile Toxins by PCR Positive    Narrative:      Performance characteristics of test not established for patients <2 years of age.    COVID PRE-OP / PRE-PROCEDURE SCREENING ORDER (NO ISOLATION) - Swab, Nasal Cavity [[id number]]  (Normal)  Collected: 06/27/22 1141    Lab Status: Final result Specimen: Swab from Nasal Cavity Updated: 06/27/22 1223    Narrative:      The following orders were created for panel order COVID PRE-OP / PRE-PROCEDURE SCREENING ORDER (NO ISOLATION) - Swab, Nasal Cavity.  Procedure                               Abnormality         Status                     ---------                               -----------         ------                     COVID-19,[facility] [employer] IN-JULI...[[id number]]  Normal              Final result                 Please view results for these tests on the individual orders.    COVID-19,[facility] [employer] IN-HOUSE,Nasal Swab No Transport Media 3-4 HR TAT - Swab, Nasal Cavity [[id number]]  (Normal) Collected: 06/27/22 1141    Lab Status: Final result Specimen: Swab from Nasal Cavity Updated: 06/27/22 1223     COVID19 Not Detected    Narrative:      Fact sheet for providers: https://www.fda.gov/media/265241/download     Fact sheet for patients: https://www.fda.gov/media/521818/download    Test performed by PCR.    Consider negative results in combination with clinical observations, patient history, and epidemiological information.  Fact sheet for providers: https://www.fda.gov/media/051626/download     Fact sheet for patients: https://www.fda.gov/media/644204/download    Test performed by PCR.    Consider negative results in combination with clinical observations, patient history, and epidemiological information.    Blood Culture With DENNISE - Blood, Blood, Central Line [[id number]]  (Normal) Collected: 06/27/22 1011    Lab Status: Final result Specimen: Blood, Central Line Updated: 07/02/22 1048     Blood Culture No growth at 5 days    Blood Culture With DENNISE - Blood, Hand, Left [[id number]]  (Normal) Collected: 06/27/22 1001    Lab Status: Final result Specimen: Blood from Hand, Left Updated: 07/02/22 1032     Blood Culture No growth at 5 days        Chief Complaint on Day of Discharge: Adamantly refuses rehabilitation placement  "and wants to go home    History of Present Illness on Day of Discharge: Feeling better, tolerating current diet with some displeasure. States has ambulated without difficulty and can take care of himself at home.      Hospital Course:  The patient is a 82 y.o. male who presented to [facility] with hypotension, melena and diarrhea. RBC, FFP and platelets were transfuse and he was evaluated promptly by GI with EGD and found to have a duodenal ulcer with arterial bleeding. Attempts to control the bleeding with endoscopy were unsuccessful and he was immediately transferred to the OR. He underwent gastrotomy/duodenotomy with suture repair of the bleeding ulcer.  Post op hemoglobin has remained stable around 9, no further bleeding noted.    Also he tested positive for C diff antigen and was treated with oral vancomycin. He can complete this treatment at home. Vancomycin was switched to Flagyl due to insurance costs.     He is adamant about going home, I believe he will become irate and disoriented if not returned to his environment. Patient appears strong and capable of taking care of himself.     Condition on Discharge:  Stable    Physical Exam on Discharge:  /90 (BP Location: Right arm, Patient Position: Lying)   Pulse 80   Temp 98.4 °F (36.9 °C) (Oral)   Resp 16   Ht 180.3 cm (71\")   Wt 87 kg (191 lb 12.8 oz)   SpO2 92%   BMI 26.75 kg/m²   Physical Exam  Constitutional:       Appearance: He is well-developed. He is not ill-appearing or diaphoretic.   HENT:      Head: Normocephalic and atraumatic.      Right Ear: External ear normal.      Left Ear: External ear normal.      Nose: Nose normal.      Mouth/Throat:      Mouth: Mucous membranes are dry.   Eyes:      General:         Right eye: No discharge.         Left eye: No discharge.      Extraocular Movements: Extraocular movements intact.      Conjunctiva/sclera: Conjunctivae normal.      Pupils: Pupils are equal, round, and reactive to light. "   Neck:      Vascular: No JVD.   Cardiovascular:      Rate and Rhythm: Regular rhythm. Tachycardia present.      Heart sounds: Normal heart sounds. No murmur heard.  Pulmonary:      Effort: Pulmonary effort is normal. No respiratory distress.      Breath sounds: Normal breath sounds. No wheezing or rales.   Chest:      Chest wall: No tenderness.   Abdominal:      General: Bowel sounds are normal. There is no distension.      Palpations: Abdomen is soft.      Tenderness: There is no abdominal tenderness. There is no guarding or rebound.   Musculoskeletal:         General: No tenderness or deformity. Normal range of motion.      Cervical back: Normal range of motion and neck supple. No rigidity.      Right lower leg: No edema.      Left lower leg: No edema.   Skin:     General: Skin is warm and dry.      Findings: No rash.   Neurological:      General: No focal deficit present.      Mental Status: He is alert and oriented to person, place, and time. Mental status is at baseline.      Cranial Nerves: No cranial nerve deficit.      Sensory: No sensory deficit.      Motor: No abnormal muscle tone.      Deep Tendon Reflexes: Reflexes normal.   Psychiatric:         Mood and Affect: Mood normal.         Behavior: Behavior normal.     Discharge Disposition:  Home    Discharge Medications:     Discharge Medications      New Medications      Instructions Start Date   acetaminophen 325 MG tablet  Commonly known as: TYLENOL   650 mg, Oral, Every 6 Hours PRN      pantoprazole 40 MG EC tablet  Commonly known as: PROTONIX   40 mg, Oral, 2 Times Daily Before Meals      saccharomyces boulardii 250 MG capsule  Commonly known as: Florastor   250 mg, Oral, 2 Times Daily      vancomycin 125 MG capsule  Commonly known as: VANCOCIN   125 mg, Oral, Every 6 Hours Scheduled         Continue These Medications      Instructions Start Date   fenofibrate 145 MG tablet  Commonly known as: TRICOR   145 mg, Oral, Daily      hydroCHLOROthiazide 25 MG  tablet  Commonly known as: HYDRODIURIL   25 mg, Oral, Daily      lisinopril 40 MG tablet  Commonly known as: PRINIVIL,ZESTRIL   40 mg, Oral, Daily           Discharge Diet:   Gastric soft to regular     Activity at Discharge:   Resume usual activity    Follow-up Appointments:   PCP in 1-2 weeks    Electronically signed by Randy Kirk MD, 07/05/22, 11:14 CDT.    Time: 45 minutes        Electronically signed by Randy Kirk MD at 07/05/22 1155     Randy Kirk MD at 07/06/22 1341              [facility] Medicine Services  DISCHARGE SUMMARY       Date of Admission: 6/19/2022  Date of Discharge:  7/6/2022  Primary Care Physician: Tenzin Sam MD    Presenting Problem/Chief Complaint:  Hypotension    Final Discharge Diagnoses:  Hypovolemic shock present on admission due to acute blood loss anemia due to bleeding duodenal ulcer status post surgical repair  Sepsis due to clostridium difficile, improved  Clostridium difficile colitis and diarrhea, improving    Consults: Dr Nancy Jamison general surgery, Dr Rene Brice GI, Dr Jomar Gonzalez pulmonology    Procedures Performed:   6/20/2022 EGD   - Normal gastroesophageal junction and esophagus.  - Mild, benign-appearing esophageal stenosis.  - A small, sliding hiatal hernia was intermittently present during the exam.  - Moderate erosive gastritis in the prepyloric antrum.  - Giant duodenal ulcer at apex of duodenal bulb with arterial bleeding. Multiple modalities were  attempted (bipolar cautery, epinephrine injection, epi spray of ulcer bed, and placement of 2 large clips)  but were unsuccessful. Emergency consultation with general surgeon (Dr. Nancy Park) was  obtained intraoperatively, and he was taken to the OR after the procedure was terminated..  - Normal second portion of the duodenum and third portion of the duodenum.  - The examined portions of the nasopharynx, oropharynx and larynx  were normal.  - The examination was otherwise normal.  - No specimens collected.    6/20/2022 (1) Exploratory Laparotomy   (2) Duodenotomy (67700) for exploration   (3) Gastrotomy (87110) with suture repair of bleeding ulcer   (4) Closure of duodenotomy and Gastrostomy     Pertinent Test Results:   Imaging Results (All)     Procedure Component Value Units Date/Time    CT Chest With & Without Contrast Diagnostic [[id number]] Collected: 06/27/22 1234     Updated: 06/27/22 1251    Narrative:      EXAMINATION: CT CHEST W WO CONTRAST DIAGNOSTIC-      6/27/2022 10:32 AM CDT     HISTORY: Pneumonia, complication suspected, xray done;  I95.9-Hypotension, unspecified; Z74.09-Other reduced mobility;  Z78.9-Other specified health status     In order to have a CT radiation dose as low as reasonably achievable  Automated Exposure Control was utilized for adjustment of the mA and/or  KV according to patient size.     DLP in mGycm= 403     The CT scan of the chest is performed in before and after intravenous  contrast enhancement.     Images are acquired in axial plane and subsequent reconstruction in  coronal and sagittal planes.     Comparison is made with the previous study dated 6/20/2022.     The lungs are poorly expanded.     There are atelectatic changes in the right lower lung and a small  bibasal pleural effusion which was not seen in the previous study.     Previously seen 3 mm nodule in the left lower lobe, image #108 in series  3 is stable.     There is a small pneumatocele in the right posterior costophrenic sulcus  laterally.     The tracheobronchial structures are normal and patent. No endobronchial  nodules. A small diverticulum from the medial aspect of the right  mainstem bronchus is similar to the previous study.     There are groundglass opacity/infiltrate involving the left upper and  left lower lobe which was not seen in the previous study.     A heterogeneous low-density nonenhancing mass in the right lobe of  the  thyroid gland is reidentified. It measures 3.2 cm in AP dimension.     There is no axillary lymphadenopathy.     Atheromatous changes thoracic aorta is seen. A mild ectasia of the  ascending thoracic aorta persistent dysplasias 4.3 cm. No dissection.  Atheromatous changes in the coronary arteries are similar to the  previous study.     Normal opacification of pulmonary arteries and branches. No filling  defects.     There is no evidence of mediastinal or hilar mass or lymphadenopathy.     Nasogastric tube is in place.     The abdomen is separately reviewed and reported.     The images reviewed in bone window show chronic degenerative changes of  the thoracic spine. No focal bony abnormality or a bone lesion.       Impression:      1. Groundglass opacity/infiltrate in the left lung represent an acute  inflammatory/infectious process.  2. Persistent moderate ectasia of the ascending thoracic aorta. No  dissection.  3. Right thyroid nodule is stable.  4. Small bibasal pleural effusion and lower lobar atelectatic changes.                             This report was finalized on 06/27/2022 12:48 by Dr. Byron Delgadillo MD.    CT Abdomen Pelvis With & Without Contrast [[id number]] Collected: 06/27/22 1140     Updated: 06/27/22 1206    Narrative:      EXAMINATION: CT ABDOMEN PELVIS W WO CONTRAST-      6/27/2022 10:32 AM CDT     HISTORY: Abdominal pain, post-op; I95.9-Hypotension, unspecified;  Z74.09-Other reduced mobility; Z78.9-Other specified health status     In order to have a CT radiation dose as low as reasonably achievable  Automated Exposure Control was utilized for adjustment of the mA and/or  KV according to patient size.     DLP in mGycm= 703     The CT scan of the abdomen and pelvis is performed before and after  intravenous contrast and is patent.     The Images are acquired in axial plane and subsequent reconstruction in  coronal and sagittal planes.     Comparison is made with the previous study dated  6/20/2022.     The lung bases included in the study show mild atelectatic changes and a  small bibasal pleural effusion. Atheromatous changes of coronary  arteries are noted.     Unenhanced liver is unremarkable except for low-density nodule in the  left lobe similar to the previous study. The spleen is normal.     Moderately distended gallbladder is seen with a layering high-density  fluid is may represent sludge. No calculi.     Pancreas is normal.     The adrenal glands bilaterally are normal.     Moderate lobulation and renal contour bilaterally. Low-density nodules  in both kidneys are reidentified. The largest nodule in the upper pole  of the left kidney measures 2.3 cm. The CT density suggests a cyst.  Several tiny nodule the kidneys bilaterally are too small to be further  characterized. No calculi. No hydronephrosis. The ureters are poorly  visualized. No intrinsic abnormality. Small bowel amount of air is seen  in the nondependent part of the urinary bladder. The urinary bladder is  well distended. No calculi.     There is a nasogastric tube in place. The stomach is decompressed  compared with the previous study. There is moderate irregular thickening  of the gastric antrum and duodenal bulb which was not obvious in the  previous study. There is a small diverticulum from the proximal  descending duodenum projectile     Head of the pancreas. There is no evidence of contrast extravasation.  There is moderate retroperitoneal fat infiltration around the descending  duodenum. The small bowel is normal. The appendix are normal. Moderate  gas and stool is seen in the colon. There are filling defects in the  partially opacified cecum and ascending colon which probably represent  fecal material. However obscured and adjacent nonobstructing  nodule/mass. There is diverticulosis of the distal colon. There is  moderate diffuse thickening of the wall of the descending and sigmoid  colon. There is a mild surrounding  peritumoral infiltration.     There is a right mid abdominal approach draining catheter is distal end  in adjacent and below the right lobe of the liver.     Atheromatous changes of the abdominal aorta and iliac arteries. No  aneurysmal dilatation.     There is no evidence of dominant or pelvic lymphadenopathy.     There is a mild peritumoral infiltration without significant nodules or  masses. This may be due to the recent laparoscopy. No free fluid or free  air.     Images reviewed in bone window show no acute bony abnormality or a bone  lesion. Chronic degenerative changes of the lumbar spine are seen with a  mild dextroscoliosis.       Impression:      1. A nasogastric tube in place. Decompression of the stomach since the  previous study. A drainage tube in place.  2. Moderate thickening of the wall/mucosa of the gastric antrum and  duodenal bulb the may represent an inflammatory process as suggested in  the history. No leakage of the contrast material in the area.  3. Thickening of the wall of the descending and sigmoid colon with  surrounding peritumoral infiltration may represent acute colitis. This  could partly be due to incomplete distention.  4. The diverticulosis of the distal colon. No evidence for  diverticulitis.  5. Atelectatic changes and a small bibasal pleural effusion.  6. Small air in the origin of the plantar may be due to recent  instrumentation or inflammatory/infectious process.  7. Low density nodule the liver and spleen are stable and probably  represent cysts.                                   This report was finalized on 06/27/2022 12:03 by Dr. Byron Delgadillo MD.    XR Chest 1 View [[id number]] Collected: 06/27/22 0711     Updated: 06/27/22 0716    Narrative:      EXAM/TECHNIQUE: XR CHEST 1 VW-     INDICATION: shortness of air; I95.9-Hypotension, unspecified;  Z74.09-Other reduced mobility; Z78.9-Other specified health status     COMPARISON: 6/23/2022     FINDINGS:     Interval  extubation. RIGHT IJ CVL tip overlies the SVC. Enteric tube tip  projects over the GE junction. Cardiac silhouette is within normal  limits. Bibasilar opacities have significantly decreased. No new  airspace opacity. No pleural effusion or visible pneumothorax. [place]  project over the RIGHT upper abdomen.       Impression:         1.  Interval extubation. CVL appears in good position. Enteric tube tip  projects over the GE junction, recommend advancement.  2.  Interval decrease in bibasilar opacities.  This report was finalized on 06/27/2022 07:13 by Dr. Vance Segundo MD.    XR Chest 1 View [[id number]] Collected: 06/23/22 0726     Updated: 06/23/22 0730    Narrative:      EXAM/TECHNIQUE: XR CHEST 1 VW-     INDICATION: Ventilated patient, hypotension     COMPARISON: 6/21/2022     FINDINGS:     Endotracheal tube is 4.8 cm above the vladimir. Enteric tube terminates  below the diaphragm out of the field of view. RIGHT IJ CVL tip overlies  the cavoatrial junction.     Cardiac silhouette is stable. No change in RIGHT infrahilar and LEFT  lower lobe airspace opacities. No large pleural effusion. No visible  pneumothorax.       Impression:         No change in appearance of the chest.  This report was finalized on 06/23/2022 07:27 by Dr. Vance Segundo MD.    XR Chest 1 View [[id number]] Collected: 06/21/22 0725     Updated: 06/21/22 0731    Narrative:      EXAM/TECHNIQUE: XR CHEST 1 VW-     INDICATION: ETT in place; I95.9-Hypotension, unspecified     COMPARISON: 6/20/2022     FINDINGS:     Interval intubation with endotracheal tube 4 cm above the vladimir.  RIGHT IJ CVL with tip overlying the cavoatrial junction.  Enteric tube terminates below the diaphragm out of the field of view.     Cardiac silhouette is borderline prominent but stable allowing for  differences in lung volumes and positioning. Mild central vascular  congestion is suspected. No pleural effusion or pneumothorax. New mild  LEFT basilar opacity, likely  atelectasis. No acute osseous finding.       Impression:         Support lines/tubes appear in good position. Suspect mild LEFT basilar  atelectasis.  This report was finalized on 06/21/2022 07:28 by Dr. Vance Segundo MD.    CT Angiogram Abdomen Pelvis [[id number]] Collected: 06/20/22 0823     Updated: 06/20/22 0840    Narrative:      EXAMINATION: CT ANGIOGRAM ABDOMEN PELVIS-      6/20/2022 1:37 AM CDT     HISTORY: Aortic aneurysm (AAA), surveillance     In order to have a CT radiation dose as low as reasonably achievable  Automated Exposure Control was utilized for adjustment of the mA and/or  KV according to patient size.     DLP in mGycm= 422     CT angiography of the abdomen and pelvis are performed after intravenous  contrast and is patent. Images are acquired in axial plane and  subsequent 2-D reconstructions coronal and sagittal planes and 3-D  maximum intensity projection reconstruction.     There is no previous study for comparison.     Atheromatous changes of the abdominal aorta and iliac arteries are seen.  No aneurysmal dilatation or dissection.     There is a mild narrowing of the origin of the celiac trunk with  moderate poststenotic dilatation. The maximal lumen measures 1.1 cm.  There is subsequent normal branches.     There is long segment noncalcific plaque along the inferior margin of  the proximal superior mesenteric artery. No significant stenosis. Normal  branches.     Calcific plaques are seen at the origin of the left renal artery. No  stenosis. Normal origin course and caliber of the right renal artery  seen.     A calcific plaque is seen at the origin of the inferior mesenteric  artery. 50% diameter stenosis. The remaining inferior mesenteric artery  appears unremarkable with subsequent normal branches.     Atheromatous changes of both common internal and external iliac arteries  is seen. No significant stenosis or aneurysmal dilatation.     Normal sized common femoral arteries are seen  dividing into normal  superficial and deep femoral arteries.     The lung bases included in the study appears unremarkable. There is a  small subpleural nodule within left lower lobe, image #4 in series 4  which may represent a small granuloma?.     There are several well-defined low-density nodules in the liver which  are suboptimally and incompletely evaluated in this study. These may  represent hepatic cysts. Spleen is normal.     Pancreas appears normal.     The adrenal glands bilaterally are normal.     Moderate lobulation and renal contour bilaterally seen. There are  several well-defined sharply marginated low density nodules in both  kidneys which are incompletely evaluated in this study. The largest  nodule located posteriorly in the upper pole of the left kidney measures  1.9 cm. CT density suggests a cyst. No radiopaque calculi in either  kidney. No hydronephrosis. Limited visualized ureters are unremarkable.  Urinary bladder is poorly distended. No intrinsic abnormalities.     Prostate is moderately enlarged with intrinsic calcification. There are  small metallic densities in the prostate which may represent  radiotherapy seeds     The stomach is significantly distended with ingested material fluid and  gas. The duodenum and small bowel are normal. Normal retrocecal appendix  is seen. Large volume stool is seen in the colon. No findings to suggest  obstruction. There is diverticulosis of the colon more pronounced in the  sigmoid colon. No findings to suggest diverticulitis.     There is no evidence of abdominal or pelvic lymphadenopathy.     Images are reviewed in bone window show chronic degenerative changes of  the lumbar spine. Severe degeneration of intervertebral disc at L2-3,  L3-4 and L5-S1. There is a mild dextroscoliosis. No focal bony lesions  noted.       Impression:      1. Atheromatous changes of the abdominal aorta iliac and femoral  arteries. No aneurysmal dilatation or dissection. No  significant  stenosis.  2. Several low density nodule in the liver and both kidneys which are  incompletely evaluated in this study. These probably represent cysts.  Further follow-up may be obtained.  3. Diverticulosis of the distal colon. No evidence of acute  diverticulitis.     The above study was initially reviewed and reported by [facility]. I do not  find any discrepancies.  This report was finalized on 06/20/2022 08:37 by Dr. Byron Delgadillo MD.    CT Angiogram Chest [[id number]] Collected: 06/20/22 0818     Updated: 06/20/22 0828    Narrative:      EXAMINATION: CT ANGIOGRAM CHEST- 6/20/2022 8:18 AM CDT     HISTORY: Acute aortic syndrome (AAS) suspected, aortic aneurysm  surveillance.     DOSE: 422 mGycm (Automatic exposure control technique was implemented in  an effort to keep the radiation dose as low as possible without  compromising image quality)     REPORT: Spiral CT of the chest was performed after administration of  intravenous contrast from the thoracic inlet through the upper abdomen  using CTA protocol. Reconstructed 3-D, coronal and sagittal images were  also reviewed.     Comparison: There are no correlative imaging studies for comparison.     The contrast bolus is satisfactory. The central pulmonary arteries are  normal caliber and no filling defects are seen in the pulmonary  arteries. Heart size is normal. No evidence of right heart strain is  identified. There is ectasia of the ascending aorta, with a maximum  diameter 3.8 cm and no evidence of dissection. A small volume of  calcified plaque is noted within the aortic arch. At the arch level, the  aorta has a maximum diameter 2.7 cm in the descending thoracic aorta has  a maximum diameter 2.8 cm. There is a low-attenuation mass in the right  thyroid lobe measuring 3.4 cm. Follow-up ultrasound is recommended. No  intrathoracic lymphadenopathy is identified. There is no pneumothorax.  Review of lung windows demonstrates a few scattered calcified  granulomas  and there is mild bibasilar atelectasis. No evidence of pneumonia is  identified. There is a 3 mm noncalcified nodule in the left lower lobe  image 117 series 4, most likely benign. There are several small cysts  within the liver, largest is in the left lobe measures 1.7 cm, axial  image 150. A large amount ingested food stuff is noted in the stomach.  There are benign-appearing cysts at the upper pole the left kidney. This  includes a cyst that measures 1.9 cm. Review of bone windows shows no  acute abnormality, moderate degenerative changes are noted in the  midthoracic and upper lumbar spine.       Impression:      1. No acute intrathoracic abnormality is identified. No evidence of  pulmonary embolism or aortic dissection. There is ectasia of the  ascending aorta, with a maximum diameter of 3.8 cm. No aortic aneurysm  is identified. Small volume of calcified plaque within the thoracic  aortic arch and the LAD coronary artery distribution.  2. Probably benign 3 mm noncalcified pulmonary nodules in the left lower  lobe. There is evidence of healed granulomatous disease.  3. Follow-up ultrasound of the thyroid gland is recommended to evaluate  the 3.4 cm low-attenuation mass in the right thyroid lobe.  4. Hepatic cysts and renal cysts at the upper pole of the left kidney.     A preliminary report was provided by the [facility] radiology service.                 This report was finalized on 06/20/2022 08:25 by Dr. Edmund Escudero MD.    XR Chest 1 View [[id number]] Collected: 06/20/22 0708     Updated: 06/20/22 0714    Narrative:      EXAM/TECHNIQUE: XR CHEST 1 VW-     INDICATION: weakness     COMPARISON: 6/14/2022     FINDINGS:     Cardiomediastinal silhouette is within normal limits. No pleural  effusion or pneumothorax. No focal consolidation. No acute osseous  findings.       Impression:         No acute findings.   This report was finalized on 06/20/2022 07:11 by Dr. Vance Segundo MD.          LAB  RESULTS:      Lab 07/05/22  0552 07/04/22  0607 07/02/22  0519 06/30/22  0537   WBC 9.93 11.54* 14.81* 11.97*   HEMOGLOBIN 9.7* 9.0* 8.6* 8.1*   HEMATOCRIT 29.5* 28.6* 26.2* 25.7*   PLATELETS 336 362 349 301   NEUTROS ABS  --   --   --  7.87*   IMMATURE GRANS (ABS)  --   --   --  0.47*   LYMPHS ABS  --   --   --  2.05   MONOS ABS  --   --   --  0.83   EOS ABS  --   --   --  0.71*   MCV 88.1 89.4 88.8 92.1         Lab 07/05/22 0552 07/04/22  0607 07/02/22 0519 07/01/22  0514 06/30/22  0537   SODIUM 133* 133* 135* 136 135*   POTASSIUM 4.3 4.0 3.9 3.9 3.5   CHLORIDE 98 100 102 103 101   CO2 23.0 25.0 26.0 23.0 27.0   ANION GAP 12.0 8.0 7.0 10.0 7.0   BUN 16 17 21 23 31*   CREATININE 0.92 0.84 0.82 0.68* 0.79   EGFR 83.1 87.1 87.7 92.8 88.7   GLUCOSE 86 95 85 92 112*   CALCIUM 8.8 9.0 9.0 8.6 8.4*   MAGNESIUM  --  1.8 1.8 1.9 1.9   PHOSPHORUS  --  3.0 2.8 2.6 2.2*                         Brief Urine Lab Results  (Last result in the past 365 days)      Color   Clarity   Blood   Leuk Est   Nitrite   Protein   CREAT   Urine HCG        06/27/22 0523 Dark Yellow   Clear   Trace   Negative   Negative   Trace               Microbiology Results (last 10 days)     Procedure Component Value - Date/Time    Clostridium Difficile Toxin - Stool, Per Rectum [[id number]]  (Abnormal) Collected: 06/27/22 1639    Lab Status: Final result Specimen: Stool from Per Rectum Updated: 06/27/22 6960    Narrative:      The following orders were created for panel order Clostridium Difficile Toxin - Stool, Per Rectum.  Procedure                               Abnormality         Status                     ---------                               -----------         ------                     Clostridium Difficile To...[[id number]]  Abnormal            Final result                 Please view results for these tests on the individual orders.    Clostridium Difficile Toxin, PCR - Stool, Per Rectum [[id number]]  (Abnormal) Collected: 06/27/22 2932    Lab  Status: Final result Specimen: Stool from Per Rectum Updated: 06/27/22 1850     C. Difficile Toxins by PCR Positive    Narrative:      Performance characteristics of test not established for patients <2 years of age.    COVID PRE-OP / PRE-PROCEDURE SCREENING ORDER (NO ISOLATION) - Swab, Nasal Cavity [[id number]]  (Normal) Collected: 06/27/22 1141    Lab Status: Final result Specimen: Swab from Nasal Cavity Updated: 06/27/22 1223    Narrative:      The following orders were created for panel order COVID PRE-OP / PRE-PROCEDURE SCREENING ORDER (NO ISOLATION) - Swab, Nasal Cavity.  Procedure                               Abnormality         Status                     ---------                               -----------         ------                     COVID-19,[facility]...[[id number]]  Normal              Final result                 Please view results for these tests on the individual orders.    COVID-19,[facility] IN-HOUSE,Nasal Swab No Transport Media 3-4 HR TAT - Swab, Nasal Cavity [[id number]]  (Normal) Collected: 06/27/22 1141    Lab Status: Final result Specimen: Swab from Nasal Cavity Updated: 06/27/22 1223     COVID19 Not Detected    Narrative:      Fact sheet for providers: https://www.fda.gov/media/311831/download     Fact sheet for patients: https://www.fda.gov/media/008562/download    Test performed by PCR.    Consider negative results in combination with clinical observations, patient history, and epidemiological information.  Fact sheet for providers: https://www.fda.gov/media/522015/download     Fact sheet for patients: https://www.fda.gov/media/237128/download    Test performed by PCR.    Consider negative results in combination with clinical observations, patient history, and epidemiological information.    Blood Culture With DENNISE - Blood, Blood, Central Line [[id number]]  (Normal) Collected: 06/27/22 1011    Lab Status: Final result Specimen: Blood, Central Line Updated: 07/02/22 1048     Blood  "Culture No growth at 5 days    Blood Culture With DENNISE - Blood, Hand, Left [[id number]]  (Normal) Collected: 06/27/22 1001    Lab Status: Final result Specimen: Blood from Hand, Left Updated: 07/02/22 1032     Blood Culture No growth at 5 days        Chief Complaint on Day of Discharge: Adamantly refuses rehabilitation placement and wants to go home    History of Present Illness on Day of Discharge: Feeling better, tolerating current diet with some displeasure. States has ambulated without difficulty and can take care of himself at home.      Hospital Course:  The patient is a 82 y.o. male who presented to [facility] with hypotension, melena and diarrhea. RBC, FFP and platelets were transfuse and he was evaluated promptly by GI with EGD and found to have a duodenal ulcer with arterial bleeding. Attempts to control the bleeding with endoscopy were unsuccessful and he was immediately transferred to the OR. He underwent gastrotomy/duodenotomy with suture repair of the bleeding ulcer.  Post op hemoglobin has remained stable around 9, no further bleeding noted.    Also he tested positive for C diff antigen and was treated with oral vancomycin. He can complete this treatment at home. Vancomycin was switched to Flagyl due to insurance costs.     Patient agreed to consider subacute rehabilitation yesterday. He will benefit from placement for short term.     Condition on Discharge:  Stable    Physical Exam on Discharge:  /62 (BP Location: Right arm, Patient Position: Sitting)   Pulse 69   Temp 98.3 °F (36.8 °C) (Oral)   Resp 16   Ht 180.3 cm (71\")   Wt 83.1 kg (183 lb 3.2 oz)   SpO2 91%   BMI 25.55 kg/m²   Physical Exam  Constitutional:       Appearance: He is well-developed. He is not ill-appearing or diaphoretic.   HENT:      Head: Normocephalic and atraumatic.      Right Ear: External ear normal.      Left Ear: External ear normal.      Nose: Nose normal.      Mouth/Throat:      Mouth: Mucous " membranes are dry.   Eyes:      General:         Right eye: No discharge.         Left eye: No discharge.      Extraocular Movements: Extraocular movements intact.      Conjunctiva/sclera: Conjunctivae normal.      Pupils: Pupils are equal, round, and reactive to light.   Neck:      Vascular: No JVD.   Cardiovascular:      Rate and Rhythm: Regular rhythm. Tachycardia present.      Heart sounds: Normal heart sounds. No murmur heard.  Pulmonary:      Effort: Pulmonary effort is normal. No respiratory distress.      Breath sounds: Normal breath sounds. No wheezing or rales.   Chest:      Chest wall: No tenderness.   Abdominal:      General: Bowel sounds are normal. There is no distension.      Palpations: Abdomen is soft.      Tenderness: There is no abdominal tenderness. There is no guarding or rebound.   Musculoskeletal:         General: No tenderness or deformity. Normal range of motion.      Cervical back: Normal range of motion and neck supple. No rigidity.      Right lower leg: No edema.      Left lower leg: No edema.   Skin:     General: Skin is warm and dry.      Findings: No rash.   Neurological:      General: No focal deficit present.      Mental Status: He is alert and oriented to person, place, and time. Mental status is at baseline.      Cranial Nerves: No cranial nerve deficit.      Sensory: No sensory deficit.      Motor: No abnormal muscle tone.      Deep Tendon Reflexes: Reflexes normal.   Psychiatric:         Mood and Affect: Mood normal.         Behavior: Behavior normal.     Discharge Disposition:  SNF for subacute rehabilitation    Discharge Medications:     Discharge Medications      New Medications      Instructions Start Date   acetaminophen 325 MG tablet  Commonly known as: TYLENOL   650 mg, Oral, Every 6 Hours PRN      pantoprazole 40 MG EC tablet  Commonly known as: PROTONIX   40 mg, Oral, 2 Times Daily Before Meals      saccharomyces boulardii 250 MG capsule  Commonly known as: Florastor    250 mg, Oral, 2 Times Daily      vancomycin 125 MG capsule  Commonly known as: VANCOCIN   125 mg, Oral, Every 6 Hours Scheduled         Continue These Medications      Instructions Start Date   fenofibrate 145 MG tablet  Commonly known as: TRICOR   145 mg, Oral, Daily      hydroCHLOROthiazide 25 MG tablet  Commonly known as: HYDRODIURIL   25 mg, Oral, Daily      lisinopril 40 MG tablet  Commonly known as: PRINIVIL,ZESTRIL   40 mg, Oral, Daily           Discharge Diet:   Gastric soft to regular     Activity at Discharge:   Resume usual activity    Follow-up Appointments:   PCP in 2-4weeks    Electronically signed by Maya Evans MD, 07/06/22, 13:41 CDT.    Time: 35 minutes        Electronically signed by Maya Evans MD at 07/06/22 1350       Discharge Order (From admission, onward)     Start     Ordered    07/06/22 1352  Discharge patient  Once        Expected Discharge Date: 07/06/22    Discharge Disposition: Long Term Care (DC - External)    Physician of Record for Attribution - Please select from Treatment Team: MAYA EVANS [[id number]]    Review needed by CMO to determine Physician of Record: No       Question Answer Comment   Physician of Record for Attribution - Please select from Treatment Team MAYA EVANS    Review needed by CMO to determine Physician of Record No        07/06/22 1352    07/05/22 1115  Discharge patient  Once,   Status:  Canceled        Expected Discharge Date: 07/05/22    Discharge Disposition: Home or Self Care    Physician of Record for Attribution - Please select from Treatment Team: MAYA EVANS [[id number]]    Review needed by CMO to determine Physician of Record: No       Question Answer Comment   Physician of Record for Attribution - Please select from Treatment Team MAYA EVANS    Review needed by CMO to determine Physician of Record No        07/05/22 1117

## 2022-07-06 NOTE — PLAN OF CARE
Goal Outcome Evaluation:     Pt c/o headache this shift; given prn meds per orders. Pt is IID. Pt is on a GI soft diet. Pt is up with SBA and walker. Pt is voiding. Pt is on RA and tolerating well. VSS; safety maintained. Pending insurance approval for SNF.

## 2022-07-07 NOTE — OUTREACH NOTE
Prep Survey    Flowsheet Row Responses   [facility] facility patient discharged from? [facility]   Is LACE score < 7 ? No   Emergency Room discharge w/ pulse ox? No   Eligibility Readm Mgmt   Discharge diagnosis Hypovolemic shock present on admission due to acute blood loss anemia due to bleeding duodenal ulcer status post surgical repair   Does the patient have one of the following disease processes/diagnoses(primary or secondary)? Sepsis   Does the patient have Home health ordered? No   Is there a DME ordered? No   General alerts for this patient Sepsis and Surgery this admit    Prep survey completed? Yes          CAROLINA NICHOLS - Registered Nurse

## 2022-07-07 NOTE — PAYOR COMM NOTE
"[facility] 7-6-22  CASE [id number]   [phone]      Echo Will (82 y.o. Male)             Date of Birth   1940    Social Security Number       Address   [address] [place] [id number]    Home Phone   [phone]    MRN   [id number]       [facility]astaf[facility]   [facility]    Marital Status                               Admission Date   6/19/22    Admission Type   Emergency    Admitting Provider   Randy Kirk MD    Attending Provider       Department, Room/Bed   [facility] 3C, 376/1       Discharge Date   7/6/2022    Discharge Disposition   Home or Self Care    Discharge Destination                               Attending Provider: (none)   Allergies: Amoxicillin, Mobic [Meloxicam]    Isolation: None   Infection: C.difficile (06/27/22)   Code Status: Prior   Advance Care Planning Activity    Ht: 180.3 cm (71\")   Wt: 83.1 kg (183 lb 3.2 oz)    Admission Cmt: None   Principal Problem: Bleeding duodenal ulcer [K26.4]                 Active Insurance as of 6/19/2022     Primary Coverage     Payor Plan Insurance Group Employer/Plan Group    ANTHEM MEDICARE REPLACEMENT ANTHEM MEDICARE ADVANTAGE [id number]     Payor Plan Address Payor Plan Phone Number Payor Plan Fax Number Effective Dates    [address] [phone]  1/1/2015 - None Entered    [facility] [address]       Subscriber Name Subscriber Birth Date Member ID       ECHO WILL 1940 [id number]                 Emergency Contacts      (Rel.) Home Phone Work Phone Mobile Phone    WiMadelyn estes (Spouse) [phone] -- [phone]    Kylie Reveles (Daughter) [phone] -- --    Tayler Hummel (Other) [phone] -- --               Discharge Summary      Randy Kirk MD at 07/05/22 [address] [place] [address] Medicine Services  DISCHARGE SUMMARY       Date of Admission: 6/19/2022  Date of Discharge:  7/5/2022  Primary Care Physician: Cecy" Tenzin WILSON MD    Presenting Problem/Chief Complaint:  Hypotension    Final Discharge Diagnoses:  Hypovolemic shock present on admission due to acute blood loss anemia due to bleeding duodenal ulcer status post surgical repair  Sepsis due to clostridium difficile, improved  Clostridium difficile colitis and diarrhea, improving    Consults: Dr Nancy Jamison general surgery, Dr Rene Brice GI, Dr Jomar Gonzalez pulmonology    Procedures Performed:   6/20/2022 EGD   - Normal gastroesophageal junction and esophagus.  - Mild, benign-appearing esophageal stenosis.  - A small, sliding hiatal hernia was intermittently present during the exam.  - Moderate erosive gastritis in the prepyloric antrum.  - Giant duodenal ulcer at apex of duodenal bulb with arterial bleeding. Multiple modalities were  attempted (bipolar cautery, epinephrine injection, epi spray of ulcer bed, and placement of 2 large clips)  but were unsuccessful. Emergency consultation with general surgeon (Dr. Nancy Park) was  obtained intraoperatively, and he was taken to the OR after the procedure was terminated..  - Normal second portion of the duodenum and third portion of the duodenum.  - The examined portions of the nasopharynx, oropharynx and larynx were normal.  - The examination was otherwise normal.  - No specimens collected.    6/20/2022 (1) Exploratory Laparotomy   (2) Duodenotomy (58144) for exploration   (3) Gastrotomy (93171) with suture repair of bleeding ulcer   (4) Closure of duodenotomy and Gastrostomy     Pertinent Test Results:   Imaging Results (All)     Procedure Component Value Units Date/Time    CT Chest With & Without Contrast Diagnostic [[id number]] Collected: 06/27/22 1234     Updated: 06/27/22 1251    Narrative:      EXAMINATION: CT CHEST W WO CONTRAST DIAGNOSTIC-      6/27/2022 10:32 AM CDT     HISTORY: Pneumonia, complication suspected, xray done;  I95.9-Hypotension, unspecified; Z74.09-Other reduced mobility;  Z78.9-Other  specified health status     In order to have a CT radiation dose as low as reasonably achievable  Automated Exposure Control was utilized for adjustment of the mA and/or  KV according to patient size.     DLP in mGycm= 403     The CT scan of the chest is performed in before and after intravenous  contrast enhancement.     Images are acquired in axial plane and subsequent reconstruction in  coronal and sagittal planes.     Comparison is made with the previous study dated 6/20/2022.     The lungs are poorly expanded.     There are atelectatic changes in the right lower lung and a small  bibasal pleural effusion which was not seen in the previous study.     Previously seen 3 mm nodule in the left lower lobe, image #108 in series  3 is stable.     There is a small pneumatocele in the right posterior costophrenic sulcus  laterally.     The tracheobronchial structures are normal and patent. No endobronchial  nodules. A small diverticulum from the medial aspect of the right  mainstem bronchus is similar to the previous study.     There are groundglass opacity/infiltrate involving the left upper and  left lower lobe which was not seen in the previous study.     A heterogeneous low-density nonenhancing mass in the right lobe of the  thyroid gland is reidentified. It measures 3.2 cm in AP dimension.     There is no axillary lymphadenopathy.     Atheromatous changes thoracic aorta is seen. A mild ectasia of the  ascending thoracic aorta persistent dysplasias 4.3 cm. No dissection.  Atheromatous changes in the coronary arteries are similar to the  previous study.     Normal opacification of pulmonary arteries and branches. No filling  defects.     There is no evidence of mediastinal or hilar mass or lymphadenopathy.     Nasogastric tube is in place.     The abdomen is separately reviewed and reported.     The images reviewed in bone window show chronic degenerative changes of  the thoracic spine. No focal bony abnormality or a  bone lesion.       Impression:      1. Groundglass opacity/infiltrate in the left lung represent an acute  inflammatory/infectious process.  2. Persistent moderate ectasia of the ascending thoracic aorta. No  dissection.  3. Right thyroid nodule is stable.  4. Small bibasal pleural effusion and lower lobar atelectatic changes.                             This report was finalized on 06/27/2022 12:48 by Dr. Byron Delgadillo MD.    CT Abdomen Pelvis With & Without Contrast [[id number]] Collected: 06/27/22 1140     Updated: 06/27/22 1206    Narrative:      EXAMINATION: CT ABDOMEN PELVIS W WO CONTRAST-      6/27/2022 10:32 AM CDT     HISTORY: Abdominal pain, post-op; I95.9-Hypotension, unspecified;  Z74.09-Other reduced mobility; Z78.9-Other specified health status     In order to have a CT radiation dose as low as reasonably achievable  Automated Exposure Control was utilized for adjustment of the mA and/or  KV according to patient size.     DLP in mGycm= 703     The CT scan of the abdomen and pelvis is performed before and after  intravenous contrast and is patent.     The Images are acquired in axial plane and subsequent reconstruction in  coronal and sagittal planes.     Comparison is made with the previous study dated 6/20/2022.     The lung bases included in the study show mild atelectatic changes and a  small bibasal pleural effusion. Atheromatous changes of coronary  arteries are noted.     Unenhanced liver is unremarkable except for low-density nodule in the  left lobe similar to the previous study. The spleen is normal.     Moderately distended gallbladder is seen with a layering high-density  fluid is may represent sludge. No calculi.     Pancreas is normal.     The adrenal glands bilaterally are normal.     Moderate lobulation and renal contour bilaterally. Low-density nodules  in both kidneys are reidentified. The largest nodule in the upper pole  of the left kidney measures 2.3 cm. The CT density suggests a  cyst.  Several tiny nodule the kidneys bilaterally are too small to be further  characterized. No calculi. No hydronephrosis. The ureters are poorly  visualized. No intrinsic abnormality. Small bowel amount of air is seen  in the nondependent part of the urinary bladder. The urinary bladder is  well distended. No calculi.     There is a nasogastric tube in place. The stomach is decompressed  compared with the previous study. There is moderate irregular thickening  of the gastric antrum and duodenal bulb which was not obvious in the  previous study. There is a small diverticulum from the proximal  descending duodenum projectile     Head of the pancreas. There is no evidence of contrast extravasation.  There is moderate retroperitoneal fat infiltration around the descending  duodenum. The small bowel is normal. The appendix are normal. Moderate  gas and stool is seen in the colon. There are filling defects in the  partially opacified cecum and ascending colon which probably represent  fecal material. However obscured and adjacent nonobstructing  nodule/mass. There is diverticulosis of the distal colon. There is  moderate diffuse thickening of the wall of the descending and sigmoid  colon. There is a mild surrounding peritumoral infiltration.     There is a right mid abdominal approach draining catheter is distal end  in adjacent and below the right lobe of the liver.     Atheromatous changes of the abdominal aorta and iliac arteries. No  aneurysmal dilatation.     There is no evidence of dominant or pelvic lymphadenopathy.     There is a mild peritumoral infiltration without significant nodules or  masses. This may be due to the recent laparoscopy. No free fluid or free  air.     Images reviewed in bone window show no acute bony abnormality or a bone  lesion. Chronic degenerative changes of the lumbar spine are seen with a  mild dextroscoliosis.       Impression:      1. A nasogastric tube in place. Decompression of  the stomach since the  previous study. A drainage tube in place.  2. Moderate thickening of the wall/mucosa of the gastric antrum and  duodenal bulb the may represent an inflammatory process as suggested in  the history. No leakage of the contrast material in the area.  3. Thickening of the wall of the descending and sigmoid colon with  surrounding peritumoral infiltration may represent acute colitis. This  could partly be due to incomplete distention.  4. The diverticulosis of the distal colon. No evidence for  diverticulitis.  5. Atelectatic changes and a small bibasal pleural effusion.  6. Small air in the origin of the plantar may be due to recent  instrumentation or inflammatory/infectious process.  7. Low density nodule the liver and spleen are stable and probably  represent cysts.                                   This report was finalized on 06/27/2022 12:03 by Dr. Byron Delgadillo MD.    XR Chest 1 View [[id number]] Collected: 06/27/22 0711     Updated: 06/27/22 0716    Narrative:      EXAM/TECHNIQUE: XR CHEST 1 VW-     INDICATION: shortness of air; I95.9-Hypotension, unspecified;  Z74.09-Other reduced mobility; Z78.9-Other specified health status     COMPARISON: 6/23/2022     FINDINGS:     Interval extubation. RIGHT IJ CVL tip overlies the SVC. Enteric tube tip  projects over the GE junction. Cardiac silhouette is within normal  limits. Bibasilar opacities have significantly decreased. No new  airspace opacity. No pleural effusion or visible pneumothorax. Landy  project over the RIGHT upper abdomen.       Impression:         1.  Interval extubation. CVL appears in good position. Enteric tube tip  projects over the GE junction, recommend advancement.  2.  Interval decrease in bibasilar opacities.  This report was finalized on 06/27/2022 07:13 by Dr. Vance Segundo MD.    XR Chest 1 View [[id number]] Collected: 06/23/22 0726     Updated: 06/23/22 0730    Narrative:      EXAM/TECHNIQUE: XR CHEST 1 VW-      INDICATION: Ventilated patient, hypotension     COMPARISON: 6/21/2022     FINDINGS:     Endotracheal tube is 4.8 cm above the vladimir. Enteric tube terminates  below the diaphragm out of the field of view. RIGHT IJ CVL tip overlies  the cavoatrial junction.     Cardiac silhouette is stable. No change in RIGHT infrahilar and LEFT  lower lobe airspace opacities. No large pleural effusion. No visible  pneumothorax.       Impression:         No change in appearance of the chest.  This report was finalized on 06/23/2022 07:27 by Dr. Vance Segnudo MD.    XR Chest 1 View [[id number]] Collected: 06/21/22 0725     Updated: 06/21/22 0731    Narrative:      EXAM/TECHNIQUE: XR CHEST 1 VW-     INDICATION: ETT in place; I95.9-Hypotension, unspecified     COMPARISON: 6/20/2022     FINDINGS:     Interval intubation with endotracheal tube 4 cm above the vladimir.  RIGHT IJ CVL with tip overlying the cavoatrial junction.  Enteric tube terminates below the diaphragm out of the field of view.     Cardiac silhouette is borderline prominent but stable allowing for  differences in lung volumes and positioning. Mild central vascular  congestion is suspected. No pleural effusion or pneumothorax. New mild  LEFT basilar opacity, likely atelectasis. No acute osseous finding.       Impression:         Support lines/tubes appear in good position. Suspect mild LEFT basilar  atelectasis.  This report was finalized on 06/21/2022 07:28 by Dr. Vance Segundo MD.    CT Angiogram Abdomen Pelvis [[id number]] Collected: 06/20/22 0823     Updated: 06/20/22 0840    Narrative:      EXAMINATION: CT ANGIOGRAM ABDOMEN PELVIS-      6/20/2022 1:37 AM CDT     HISTORY: Aortic aneurysm (AAA), surveillance     In order to have a CT radiation dose as low as reasonably achievable  Automated Exposure Control was utilized for adjustment of the mA and/or  KV according to patient size.     DLP in mGycm= 422     CT angiography of the abdomen and pelvis are performed after  intravenous  contrast and is patent. Images are acquired in axial plane and  subsequent 2-D reconstructions coronal and sagittal planes and 3-D  maximum intensity projection reconstruction.     There is no previous study for comparison.     Atheromatous changes of the abdominal aorta and iliac arteries are seen.  No aneurysmal dilatation or dissection.     There is a mild narrowing of the origin of the celiac trunk with  moderate poststenotic dilatation. The maximal lumen measures 1.1 cm.  There is subsequent normal branches.     There is long segment noncalcific plaque along the inferior margin of  the proximal superior mesenteric artery. No significant stenosis. Normal  branches.     Calcific plaques are seen at the origin of the left renal artery. No  stenosis. Normal origin course and caliber of the right renal artery  seen.     A calcific plaque is seen at the origin of the inferior mesenteric  artery. 50% diameter stenosis. The remaining inferior mesenteric artery  appears unremarkable with subsequent normal branches.     Atheromatous changes of both common internal and external iliac arteries  is seen. No significant stenosis or aneurysmal dilatation.     Normal sized common femoral arteries are seen dividing into normal  superficial and deep femoral arteries.     The lung bases included in the study appears unremarkable. There is a  small subpleural nodule within left lower lobe, image #4 in series 4  which may represent a small granuloma?.     There are several well-defined low-density nodules in the liver which  are suboptimally and incompletely evaluated in this study. These may  represent hepatic cysts. Spleen is normal.     Pancreas appears normal.     The adrenal glands bilaterally are normal.     Moderate lobulation and renal contour bilaterally seen. There are  several well-defined sharply marginated low density nodules in both  kidneys which are incompletely evaluated in this study. The  largest  nodule located posteriorly in the upper pole of the left kidney measures  1.9 cm. CT density suggests a cyst. No radiopaque calculi in either  kidney. No hydronephrosis. Limited visualized ureters are unremarkable.  Urinary bladder is poorly distended. No intrinsic abnormalities.     Prostate is moderately enlarged with intrinsic calcification. There are  small metallic densities in the prostate which may represent  radiotherapy seeds     The stomach is significantly distended with ingested material fluid and  gas. The duodenum and small bowel are normal. Normal retrocecal appendix  is seen. Large volume stool is seen in the colon. No findings to suggest  obstruction. There is diverticulosis of the colon more pronounced in the  sigmoid colon. No findings to suggest diverticulitis.     There is no evidence of abdominal or pelvic lymphadenopathy.     Images are reviewed in bone window show chronic degenerative changes of  the lumbar spine. Severe degeneration of intervertebral disc at L2-3,  L3-4 and L5-S1. There is a mild dextroscoliosis. No focal bony lesions  noted.       Impression:      1. Atheromatous changes of the abdominal aorta iliac and femoral  arteries. No aneurysmal dilatation or dissection. No significant  stenosis.  2. Several low density nodule in the liver and both kidneys which are  incompletely evaluated in this study. These probably represent cysts.  Further follow-up may be obtained.  3. Diverticulosis of the distal colon. No evidence of acute  diverticulitis.     The above study was initially reviewed and reported by [facility]. I do not  find any discrepancies.  This report was finalized on 06/20/2022 08:37 by Dr. Byron Delgadillo MD.    CT Angiogram Chest [[id number]] Collected: 06/20/22 0818     Updated: 06/20/22 0828    Narrative:      EXAMINATION: CT ANGIOGRAM CHEST- 6/20/2022 8:18 AM CDT     HISTORY: Acute aortic syndrome (AAS) suspected, aortic aneurysm  surveillance.     DOSE: 422  mGycm (Automatic exposure control technique was implemented in  an effort to keep the radiation dose as low as possible without  compromising image quality)     REPORT: Spiral CT of the chest was performed after administration of  intravenous contrast from the thoracic inlet through the upper abdomen  using CTA protocol. Reconstructed 3-D, coronal and sagittal images were  also reviewed.     Comparison: There are no correlative imaging studies for comparison.     The contrast bolus is satisfactory. The central pulmonary arteries are  normal caliber and no filling defects are seen in the pulmonary  arteries. Heart size is normal. No evidence of right heart strain is  identified. There is ectasia of the ascending aorta, with a maximum  diameter 3.8 cm and no evidence of dissection. A small volume of  calcified plaque is noted within the aortic arch. At the arch level, the  aorta has a maximum diameter 2.7 cm in the descending thoracic aorta has  a maximum diameter 2.8 cm. There is a low-attenuation mass in the right  thyroid lobe measuring 3.4 cm. Follow-up ultrasound is recommended. No  intrathoracic lymphadenopathy is identified. There is no pneumothorax.  Review of lung windows demonstrates a few scattered calcified granulomas  and there is mild bibasilar atelectasis. No evidence of pneumonia is  identified. There is a 3 mm noncalcified nodule in the left lower lobe  image 117 series 4, most likely benign. There are several small cysts  within the liver, largest is in the left lobe measures 1.7 cm, axial  image 150. A large amount ingested food stuff is noted in the stomach.  There are benign-appearing cysts at the upper pole the left kidney. This  includes a cyst that measures 1.9 cm. Review of bone windows shows no  acute abnormality, moderate degenerative changes are noted in the  midthoracic and upper lumbar spine.       Impression:      1. No acute intrathoracic abnormality is identified. No evidence  of  pulmonary embolism or aortic dissection. There is ectasia of the  ascending aorta, with a maximum diameter of 3.8 cm. No aortic aneurysm  is identified. Small volume of calcified plaque within the thoracic  aortic arch and the LAD coronary artery distribution.  2. Probably benign 3 mm noncalcified pulmonary nodules in the left lower  lobe. There is evidence of healed granulomatous disease.  3. Follow-up ultrasound of the thyroid gland is recommended to evaluate  the 3.4 cm low-attenuation mass in the right thyroid lobe.  4. Hepatic cysts and renal cysts at the upper pole of the left kidney.     A preliminary report was provided by the [facility] radiology service.                 This report was finalized on 06/20/2022 08:25 by Dr. Edmund Escudero MD.    XR Chest 1 View [[id number]] Collected: 06/20/22 0708     Updated: 06/20/22 0714    Narrative:      EXAM/TECHNIQUE: XR CHEST 1 VW-     INDICATION: weakness     COMPARISON: 6/14/2022     FINDINGS:     Cardiomediastinal silhouette is within normal limits. No pleural  effusion or pneumothorax. No focal consolidation. No acute osseous  findings.       Impression:         No acute findings.   This report was finalized on 06/20/2022 07:11 by Dr. Vance Segundo MD.          LAB RESULTS:      Lab 07/05/22  0552 07/04/22  0607 07/02/22  0519 06/30/22  0537 06/29/22  0513   WBC 9.93 11.54* 14.81* 11.97* 14.88*   HEMOGLOBIN 9.7* 9.0* 8.6* 8.1* 8.3*   HEMATOCRIT 29.5* 28.6* 26.2* 25.7* 26.2*   PLATELETS 336 362 349 301 279   NEUTROS ABS  --   --   --  7.87* 10.88*   IMMATURE GRANS (ABS)  --   --   --  0.47* 0.43*   LYMPHS ABS  --   --   --  2.05 1.78   MONOS ABS  --   --   --  0.83 0.91*   EOS ABS  --   --   --  0.71* 0.82*   MCV 88.1 89.4 88.8 92.1 91.3         Lab 07/05/22  0552 07/04/22  0607 07/02/22  0519 07/01/22  0514 06/30/22  0537 06/29/22  0513   SODIUM 133* 133* 135* 136 135* 133*   POTASSIUM 4.3 4.0 3.9 3.9 3.5 3.5   CHLORIDE 98 100 102 103 101 100   CO2 23.0 25.0  26.0 23.0 27.0 25.0   ANION GAP 12.0 8.0 7.0 10.0 7.0 8.0   BUN 16 17 21 23 31* 39*   CREATININE 0.92 0.84 0.82 0.68* 0.79 0.77   EGFR 83.1 87.1 87.7 92.8 88.7 89.4   GLUCOSE 86 95 85 92 112* 114*   CALCIUM 8.8 9.0 9.0 8.6 8.4* 8.2*   MAGNESIUM  --  1.8 1.8 1.9 1.9 1.9   PHOSPHORUS  --  3.0 2.8 2.6 2.2*  --          Lab 06/29/22  0513   TOTAL PROTEIN 5.2*   ALBUMIN 2.80*   GLOBULIN 2.4   ALT (SGPT) 28   AST (SGOT) 27   BILIRUBIN 0.3   ALK PHOS 125*                     Brief Urine Lab Results  (Last result in the past 365 days)      Color   Clarity   Blood   Leuk Est   Nitrite   Protein   CREAT   Urine HCG        06/27/22 0523 Dark Yellow   Clear   Trace   Negative   Negative   Trace               Microbiology Results (last 10 days)     Procedure Component Value - Date/Time    Clostridium Difficile Toxin - Stool, Per Rectum [[id number]]  (Abnormal) Collected: 06/27/22 1639    Lab Status: Final result Specimen: Stool from Per Rectum Updated: 06/27/22 1850    Narrative:      The following orders were created for panel order Clostridium Difficile Toxin - Stool, Per Rectum.  Procedure                               Abnormality         Status                     ---------                               -----------         ------                     Clostridium Difficile To...[[id number]]  Abnormal            Final result                 Please view results for these tests on the individual orders.    Clostridium Difficile Toxin, PCR - Stool, Per Rectum [[id number]]  (Abnormal) Collected: 06/27/22 1639    Lab Status: Final result Specimen: Stool from Per Rectum Updated: 06/27/22 1850     C. Difficile Toxins by PCR Positive    Narrative:      Performance characteristics of test not established for patients <2 years of age.    COVID PRE-OP / PRE-PROCEDURE SCREENING ORDER (NO ISOLATION) - Swab, Nasal Cavity [[id number]]  (Normal) Collected: 06/27/22 1141    Lab Status: Final result Specimen: Swab from Nasal Cavity Updated: 06/27/22  1223    Narrative:      The following orders were created for panel order COVID PRE-OP / PRE-PROCEDURE SCREENING ORDER (NO ISOLATION) - Swab, Nasal Cavity.  Procedure                               Abnormality         Status                     ---------                               -----------         ------                     COVID-19,[facility] [employer] IN-JULI...[[id number]]  Normal              Final result                 Please view results for these tests on the individual orders.    COVID-19,[facility] [employer] IN-HOUSE,Nasal Swab No Transport Media 3-4 HR TAT - Swab, Nasal Cavity [[id number]]  (Normal) Collected: 06/27/22 1141    Lab Status: Final result Specimen: Swab from Nasal Cavity Updated: 06/27/22 1223     COVID19 Not Detected    Narrative:      Fact sheet for providers: https://www.fda.gov/media/069623/download     Fact sheet for patients: https://www.fda.gov/media/090404/download    Test performed by PCR.    Consider negative results in combination with clinical observations, patient history, and epidemiological information.  Fact sheet for providers: https://www.fda.gov/media/054065/download     Fact sheet for patients: https://www.fda.gov/media/713344/download    Test performed by PCR.    Consider negative results in combination with clinical observations, patient history, and epidemiological information.    Blood Culture With DENNISE - Blood, Blood, Central Line [[id number]]  (Normal) Collected: 06/27/22 1011    Lab Status: Final result Specimen: Blood, Central Line Updated: 07/02/22 1048     Blood Culture No growth at 5 days    Blood Culture With DENNISE - Blood, Hand, Left [[id number]]  (Normal) Collected: 06/27/22 1001    Lab Status: Final result Specimen: Blood from Hand, Left Updated: 07/02/22 1032     Blood Culture No growth at 5 days        Chief Complaint on Day of Discharge: Adamantly refuses rehabilitation placement and wants to go home    History of Present Illness on Day of Discharge: Feeling better, tolerating  "current diet with some displeasure. States has ambulated without difficulty and can take care of himself at home.      Hospital Course:  The patient is a 82 y.o. male who presented to [facility] with hypotension, melena and diarrhea. RBC, FFP and platelets were transfuse and he was evaluated promptly by GI with EGD and found to have a duodenal ulcer with arterial bleeding. Attempts to control the bleeding with endoscopy were unsuccessful and he was immediately transferred to the OR. He underwent gastrotomy/duodenotomy with suture repair of the bleeding ulcer.  Post op hemoglobin has remained stable around 9, no further bleeding noted.    Also he tested positive for C diff antigen and was treated with oral vancomycin. He can complete this treatment at home. Vancomycin was switched to Flagyl due to insurance costs.     He is adamant about going home, I believe he will become irate and disoriented if not returned to his environment. Patient appears strong and capable of taking care of himself.     Condition on Discharge:  Stable    Physical Exam on Discharge:  /90 (BP Location: Right arm, Patient Position: Lying)   Pulse 80   Temp 98.4 °F (36.9 °C) (Oral)   Resp 16   Ht 180.3 cm (71\")   Wt 87 kg (191 lb 12.8 oz)   SpO2 92%   BMI 26.75 kg/m²   Physical Exam  Constitutional:       Appearance: He is well-developed. He is not ill-appearing or diaphoretic.   HENT:      Head: Normocephalic and atraumatic.      Right Ear: External ear normal.      Left Ear: External ear normal.      Nose: Nose normal.      Mouth/Throat:      Mouth: Mucous membranes are dry.   Eyes:      General:         Right eye: No discharge.         Left eye: No discharge.      Extraocular Movements: Extraocular movements intact.      Conjunctiva/sclera: Conjunctivae normal.      Pupils: Pupils are equal, round, and reactive to light.   Neck:      Vascular: No JVD.   Cardiovascular:      Rate and Rhythm: Regular rhythm. Tachycardia " present.      Heart sounds: Normal heart sounds. No murmur heard.  Pulmonary:      Effort: Pulmonary effort is normal. No respiratory distress.      Breath sounds: Normal breath sounds. No wheezing or rales.   Chest:      Chest wall: No tenderness.   Abdominal:      General: Bowel sounds are normal. There is no distension.      Palpations: Abdomen is soft.      Tenderness: There is no abdominal tenderness. There is no guarding or rebound.   Musculoskeletal:         General: No tenderness or deformity. Normal range of motion.      Cervical back: Normal range of motion and neck supple. No rigidity.      Right lower leg: No edema.      Left lower leg: No edema.   Skin:     General: Skin is warm and dry.      Findings: No rash.   Neurological:      General: No focal deficit present.      Mental Status: He is alert and oriented to person, place, and time. Mental status is at baseline.      Cranial Nerves: No cranial nerve deficit.      Sensory: No sensory deficit.      Motor: No abnormal muscle tone.      Deep Tendon Reflexes: Reflexes normal.   Psychiatric:         Mood and Affect: Mood normal.         Behavior: Behavior normal.     Discharge Disposition:  Home    Discharge Medications:     Discharge Medications      New Medications      Instructions Start Date   acetaminophen 325 MG tablet  Commonly known as: TYLENOL   650 mg, Oral, Every 6 Hours PRN      pantoprazole 40 MG EC tablet  Commonly known as: PROTONIX   40 mg, Oral, 2 Times Daily Before Meals      saccharomyces boulardii 250 MG capsule  Commonly known as: Florastor   250 mg, Oral, 2 Times Daily      vancomycin 125 MG capsule  Commonly known as: VANCOCIN   125 mg, Oral, Every 6 Hours Scheduled         Continue These Medications      Instructions Start Date   fenofibrate 145 MG tablet  Commonly known as: TRICOR   145 mg, Oral, Daily      hydroCHLOROthiazide 25 MG tablet  Commonly known as: HYDRODIURIL   25 mg, Oral, Daily      lisinopril 40 MG tablet  Commonly  known as: SHABANAZESTRIL   40 mg, Oral, Daily           Discharge Diet:   Gastric soft to regular     Activity at Discharge:   Resume usual activity    Follow-up Appointments:   PCP in 1-2 weeks    Electronically signed by Randy Kirk MD, 07/05/22, 11:14 CDT.    Time: 45 minutes        Electronically signed by Randy Kirk MD at 07/05/22 1155     Randy Kirk MD at 07/06/22 1341              [facility] Medicine Services  DISCHARGE SUMMARY       Date of Admission: 6/19/2022  Date of Discharge:  7/6/2022  Primary Care Physician: Tenzin Sam MD    Presenting Problem/Chief Complaint:  Hypotension    Final Discharge Diagnoses:  Hypovolemic shock present on admission due to acute blood loss anemia due to bleeding duodenal ulcer status post surgical repair  Sepsis due to clostridium difficile, improved  Clostridium difficile colitis and diarrhea, improving    Consults: Dr Nancy Jamison general surgery, Dr Rene Brice GI, Dr Jomar Gonzalez pulmonology    Procedures Performed:   6/20/2022 EGD   - Normal gastroesophageal junction and esophagus.  - Mild, benign-appearing esophageal stenosis.  - A small, sliding hiatal hernia was intermittently present during the exam.  - Moderate erosive gastritis in the prepyloric antrum.  - Giant duodenal ulcer at apex of duodenal bulb with arterial bleeding. Multiple modalities were  attempted (bipolar cautery, epinephrine injection, epi spray of ulcer bed, and placement of 2 large clips)  but were unsuccessful. Emergency consultation with general surgeon (Dr. Nancy Park) was  obtained intraoperatively, and he was taken to the OR after the procedure was terminated..  - Normal second portion of the duodenum and third portion of the duodenum.  - The examined portions of the nasopharynx, oropharynx and larynx were normal.  - The examination was otherwise normal.  - No specimens collected.    6/20/2022 (1)  Exploratory Laparotomy   (2) Duodenotomy (06374) for exploration   (3) Gastrotomy (59016) with suture repair of bleeding ulcer   (4) Closure of duodenotomy and Gastrostomy     Pertinent Test Results:   Imaging Results (All)     Procedure Component Value Units Date/Time    CT Chest With & Without Contrast Diagnostic [[id number]] Collected: 06/27/22 1234     Updated: 06/27/22 1251    Narrative:      EXAMINATION: CT CHEST W WO CONTRAST DIAGNOSTIC-      6/27/2022 10:32 AM CDT     HISTORY: Pneumonia, complication suspected, xray done;  I95.9-Hypotension, unspecified; Z74.09-Other reduced mobility;  Z78.9-Other specified health status     In order to have a CT radiation dose as low as reasonably achievable  Automated Exposure Control was utilized for adjustment of the mA and/or  KV according to patient size.     DLP in mGycm= 403     The CT scan of the chest is performed in before and after intravenous  contrast enhancement.     Images are acquired in axial plane and subsequent reconstruction in  coronal and sagittal planes.     Comparison is made with the previous study dated 6/20/2022.     The lungs are poorly expanded.     There are atelectatic changes in the right lower lung and a small  bibasal pleural effusion which was not seen in the previous study.     Previously seen 3 mm nodule in the left lower lobe, image #108 in series  3 is stable.     There is a small pneumatocele in the right posterior costophrenic sulcus  laterally.     The tracheobronchial structures are normal and patent. No endobronchial  nodules. A small diverticulum from the medial aspect of the right  mainstem bronchus is similar to the previous study.     There are groundglass opacity/infiltrate involving the left upper and  left lower lobe which was not seen in the previous study.     A heterogeneous low-density nonenhancing mass in the right lobe of the  thyroid gland is reidentified. It measures 3.2 cm in AP dimension.     There is no axillary  lymphadenopathy.     Atheromatous changes thoracic aorta is seen. A mild ectasia of the  ascending thoracic aorta persistent dysplasias 4.3 cm. No dissection.  Atheromatous changes in the coronary arteries are similar to the  previous study.     Normal opacification of pulmonary arteries and branches. No filling  defects.     There is no evidence of mediastinal or hilar mass or lymphadenopathy.     Nasogastric tube is in place.     The abdomen is separately reviewed and reported.     The images reviewed in bone window show chronic degenerative changes of  the thoracic spine. No focal bony abnormality or a bone lesion.       Impression:      1. Groundglass opacity/infiltrate in the left lung represent an acute  inflammatory/infectious process.  2. Persistent moderate ectasia of the ascending thoracic aorta. No  dissection.  3. Right thyroid nodule is stable.  4. Small bibasal pleural effusion and lower lobar atelectatic changes.                             This report was finalized on 06/27/2022 12:48 by Dr. Byron Delgadillo MD.    CT Abdomen Pelvis With & Without Contrast [[id number]] Collected: 06/27/22 1140     Updated: 06/27/22 1206    Narrative:      EXAMINATION: CT ABDOMEN PELVIS W WO CONTRAST-      6/27/2022 10:32 AM CDT     HISTORY: Abdominal pain, post-op; I95.9-Hypotension, unspecified;  Z74.09-Other reduced mobility; Z78.9-Other specified health status     In order to have a CT radiation dose as low as reasonably achievable  Automated Exposure Control was utilized for adjustment of the mA and/or  KV according to patient size.     DLP in mGycm= 703     The CT scan of the abdomen and pelvis is performed before and after  intravenous contrast and is patent.     The Images are acquired in axial plane and subsequent reconstruction in  coronal and sagittal planes.     Comparison is made with the previous study dated 6/20/2022.     The lung bases included in the study show mild atelectatic changes and a  small  bibasal pleural effusion. Atheromatous changes of coronary  arteries are noted.     Unenhanced liver is unremarkable except for low-density nodule in the  left lobe similar to the previous study. The spleen is normal.     Moderately distended gallbladder is seen with a layering high-density  fluid is may represent sludge. No calculi.     Pancreas is normal.     The adrenal glands bilaterally are normal.     Moderate lobulation and renal contour bilaterally. Low-density nodules  in both kidneys are reidentified. The largest nodule in the upper pole  of the left kidney measures 2.3 cm. The CT density suggests a cyst.  Several tiny nodule the kidneys bilaterally are too small to be further  characterized. No calculi. No hydronephrosis. The ureters are poorly  visualized. No intrinsic abnormality. Small bowel amount of air is seen  in the nondependent part of the urinary bladder. The urinary bladder is  well distended. No calculi.     There is a nasogastric tube in place. The stomach is decompressed  compared with the previous study. There is moderate irregular thickening  of the gastric antrum and duodenal bulb which was not obvious in the  previous study. There is a small diverticulum from the proximal  descending duodenum projectile     Head of the pancreas. There is no evidence of contrast extravasation.  There is moderate retroperitoneal fat infiltration around the descending  duodenum. The small bowel is normal. The appendix are normal. Moderate  gas and stool is seen in the colon. There are filling defects in the  partially opacified cecum and ascending colon which probably represent  fecal material. However obscured and adjacent nonobstructing  nodule/mass. There is diverticulosis of the distal colon. There is  moderate diffuse thickening of the wall of the descending and sigmoid  colon. There is a mild surrounding peritumoral infiltration.     There is a right mid abdominal approach draining catheter is distal  end  in adjacent and below the right lobe of the liver.     Atheromatous changes of the abdominal aorta and iliac arteries. No  aneurysmal dilatation.     There is no evidence of dominant or pelvic lymphadenopathy.     There is a mild peritumoral infiltration without significant nodules or  masses. This may be due to the recent laparoscopy. No free fluid or free  air.     Images reviewed in bone window show no acute bony abnormality or a bone  lesion. Chronic degenerative changes of the lumbar spine are seen with a  mild dextroscoliosis.       Impression:      1. A nasogastric tube in place. Decompression of the stomach since the  previous study. A drainage tube in place.  2. Moderate thickening of the wall/mucosa of the gastric antrum and  duodenal bulb the may represent an inflammatory process as suggested in  the history. No leakage of the contrast material in the area.  3. Thickening of the wall of the descending and sigmoid colon with  surrounding peritumoral infiltration may represent acute colitis. This  could partly be due to incomplete distention.  4. The diverticulosis of the distal colon. No evidence for  diverticulitis.  5. Atelectatic changes and a small bibasal pleural effusion.  6. Small air in the origin of the plantar may be due to recent  instrumentation or inflammatory/infectious process.  7. Low density nodule the liver and spleen are stable and probably  represent cysts.                                   This report was finalized on 06/27/2022 12:03 by Dr. Byron Delgadillo MD.    XR Chest 1 View [[id number]] Collected: 06/27/22 0711     Updated: 06/27/22 0716    Narrative:      EXAM/TECHNIQUE: XR CHEST 1 VW-     INDICATION: shortness of air; I95.9-Hypotension, unspecified;  Z74.09-Other reduced mobility; Z78.9-Other specified health status     COMPARISON: 6/23/2022     FINDINGS:     Interval extubation. RIGHT IJ CVL tip overlies the SVC. Enteric tube tip  projects over the GE junction. Cardiac  silhouette is within normal  limits. Bibasilar opacities have significantly decreased. No new  airspace opacity. No pleural effusion or visible pneumothorax. Landy  project over the RIGHT upper abdomen.       Impression:         1.  Interval extubation. CVL appears in good position. Enteric tube tip  projects over the GE junction, recommend advancement.  2.  Interval decrease in bibasilar opacities.  This report was finalized on 06/27/2022 07:13 by Dr. Vance Segundo MD.    XR Chest 1 View [[id number]] Collected: 06/23/22 0726     Updated: 06/23/22 0730    Narrative:      EXAM/TECHNIQUE: XR CHEST 1 VW-     INDICATION: Ventilated patient, hypotension     COMPARISON: 6/21/2022     FINDINGS:     Endotracheal tube is 4.8 cm above the vladimir. Enteric tube terminates  below the diaphragm out of the field of view. RIGHT IJ CVL tip overlies  the cavoatrial junction.     Cardiac silhouette is stable. No change in RIGHT infrahilar and LEFT  lower lobe airspace opacities. No large pleural effusion. No visible  pneumothorax.       Impression:         No change in appearance of the chest.  This report was finalized on 06/23/2022 07:27 by Dr. Vance Segundo MD.    XR Chest 1 View [[id number]] Collected: 06/21/22 0725     Updated: 06/21/22 0731    Narrative:      EXAM/TECHNIQUE: XR CHEST 1 VW-     INDICATION: ETT in place; I95.9-Hypotension, unspecified     COMPARISON: 6/20/2022     FINDINGS:     Interval intubation with endotracheal tube 4 cm above the vladimir.  RIGHT IJ CVL with tip overlying the cavoatrial junction.  Enteric tube terminates below the diaphragm out of the field of view.     Cardiac silhouette is borderline prominent but stable allowing for  differences in lung volumes and positioning. Mild central vascular  congestion is suspected. No pleural effusion or pneumothorax. New mild  LEFT basilar opacity, likely atelectasis. No acute osseous finding.       Impression:         Support lines/tubes appear in good  position. Suspect mild LEFT basilar  atelectasis.  This report was finalized on 06/21/2022 07:28 by Dr. Vance Segundo MD.    CT Angiogram Abdomen Pelvis [[id number]] Collected: 06/20/22 0823     Updated: 06/20/22 0840    Narrative:      EXAMINATION: CT ANGIOGRAM ABDOMEN PELVIS-      6/20/2022 1:37 AM CDT     HISTORY: Aortic aneurysm (AAA), surveillance     In order to have a CT radiation dose as low as reasonably achievable  Automated Exposure Control was utilized for adjustment of the mA and/or  KV according to patient size.     DLP in mGycm= 422     CT angiography of the abdomen and pelvis are performed after intravenous  contrast and is patent. Images are acquired in axial plane and  subsequent 2-D reconstructions coronal and sagittal planes and 3-D  maximum intensity projection reconstruction.     There is no previous study for comparison.     Atheromatous changes of the abdominal aorta and iliac arteries are seen.  No aneurysmal dilatation or dissection.     There is a mild narrowing of the origin of the celiac trunk with  moderate poststenotic dilatation. The maximal lumen measures 1.1 cm.  There is subsequent normal branches.     There is long segment noncalcific plaque along the inferior margin of  the proximal superior mesenteric artery. No significant stenosis. Normal  branches.     Calcific plaques are seen at the origin of the left renal artery. No  stenosis. Normal origin course and caliber of the right renal artery  seen.     A calcific plaque is seen at the origin of the inferior mesenteric  artery. 50% diameter stenosis. The remaining inferior mesenteric artery  appears unremarkable with subsequent normal branches.     Atheromatous changes of both common internal and external iliac arteries  is seen. No significant stenosis or aneurysmal dilatation.     Normal sized common femoral arteries are seen dividing into normal  superficial and deep femoral arteries.     The lung bases included in the study  appears unremarkable. There is a  small subpleural nodule within left lower lobe, image #4 in series 4  which may represent a small granuloma?.     There are several well-defined low-density nodules in the liver which  are suboptimally and incompletely evaluated in this study. These may  represent hepatic cysts. Spleen is normal.     Pancreas appears normal.     The adrenal glands bilaterally are normal.     Moderate lobulation and renal contour bilaterally seen. There are  several well-defined sharply marginated low density nodules in both  kidneys which are incompletely evaluated in this study. The largest  nodule located posteriorly in the upper pole of the left kidney measures  1.9 cm. CT density suggests a cyst. No radiopaque calculi in either  kidney. No hydronephrosis. Limited visualized ureters are unremarkable.  Urinary bladder is poorly distended. No intrinsic abnormalities.     Prostate is moderately enlarged with intrinsic calcification. There are  small metallic densities in the prostate which may represent  radiotherapy seeds     The stomach is significantly distended with ingested material fluid and  gas. The duodenum and small bowel are normal. Normal retrocecal appendix  is seen. Large volume stool is seen in the colon. No findings to suggest  obstruction. There is diverticulosis of the colon more pronounced in the  sigmoid colon. No findings to suggest diverticulitis.     There is no evidence of abdominal or pelvic lymphadenopathy.     Images are reviewed in bone window show chronic degenerative changes of  the lumbar spine. Severe degeneration of intervertebral disc at L2-3,  L3-4 and L5-S1. There is a mild dextroscoliosis. No focal bony lesions  noted.       Impression:      1. Atheromatous changes of the abdominal aorta iliac and femoral  arteries. No aneurysmal dilatation or dissection. No significant  stenosis.  2. Several low density nodule in the liver and both kidneys which  are  incompletely evaluated in this study. These probably represent cysts.  Further follow-up may be obtained.  3. Diverticulosis of the distal colon. No evidence of acute  diverticulitis.     The above study was initially reviewed and reported by [facility]. I do not  find any discrepancies.  This report was finalized on 06/20/2022 08:37 by Dr. Byron Delgadillo MD.    CT Angiogram Chest [[id number]] Collected: 06/20/22 0818     Updated: 06/20/22 0828    Narrative:      EXAMINATION: CT ANGIOGRAM CHEST- 6/20/2022 8:18 AM CDT     HISTORY: Acute aortic syndrome (AAS) suspected, aortic aneurysm  surveillance.     DOSE: 422 mGycm (Automatic exposure control technique was implemented in  an effort to keep the radiation dose as low as possible without  compromising image quality)     REPORT: Spiral CT of the chest was performed after administration of  intravenous contrast from the thoracic inlet through the upper abdomen  using CTA protocol. Reconstructed 3-D, coronal and sagittal images were  also reviewed.     Comparison: There are no correlative imaging studies for comparison.     The contrast bolus is satisfactory. The central pulmonary arteries are  normal caliber and no filling defects are seen in the pulmonary  arteries. Heart size is normal. No evidence of right heart strain is  identified. There is ectasia of the ascending aorta, with a maximum  diameter 3.8 cm and no evidence of dissection. A small volume of  calcified plaque is noted within the aortic arch. At the arch level, the  aorta has a maximum diameter 2.7 cm in the descending thoracic aorta has  a maximum diameter 2.8 cm. There is a low-attenuation mass in the right  thyroid lobe measuring 3.4 cm. Follow-up ultrasound is recommended. No  intrathoracic lymphadenopathy is identified. There is no pneumothorax.  Review of lung windows demonstrates a few scattered calcified granulomas  and there is mild bibasilar atelectasis. No evidence of pneumonia  is  identified. There is a 3 mm noncalcified nodule in the left lower lobe  image 117 series 4, most likely benign. There are several small cysts  within the liver, largest is in the left lobe measures 1.7 cm, axial  image 150. A large amount ingested food stuff is noted in the stomach.  There are benign-appearing cysts at the upper pole the left kidney. This  includes a cyst that measures 1.9 cm. Review of bone windows shows no  acute abnormality, moderate degenerative changes are noted in the  midthoracic and upper lumbar spine.       Impression:      1. No acute intrathoracic abnormality is identified. No evidence of  pulmonary embolism or aortic dissection. There is ectasia of the  ascending aorta, with a maximum diameter of 3.8 cm. No aortic aneurysm  is identified. Small volume of calcified plaque within the thoracic  aortic arch and the LAD coronary artery distribution.  2. Probably benign 3 mm noncalcified pulmonary nodules in the left lower  lobe. There is evidence of healed granulomatous disease.  3. Follow-up ultrasound of the thyroid gland is recommended to evaluate  the 3.4 cm low-attenuation mass in the right thyroid lobe.  4. Hepatic cysts and renal cysts at the upper pole of the left kidney.     A preliminary report was provided by the [facility] radiology service.                 This report was finalized on 06/20/2022 08:25 by Dr. Edmund Escudero MD.    XR Chest 1 View [[id number]] Collected: 06/20/22 0708     Updated: 06/20/22 0714    Narrative:      EXAM/TECHNIQUE: XR CHEST 1 VW-     INDICATION: weakness     COMPARISON: 6/14/2022     FINDINGS:     Cardiomediastinal silhouette is within normal limits. No pleural  effusion or pneumothorax. No focal consolidation. No acute osseous  findings.       Impression:         No acute findings.   This report was finalized on 06/20/2022 07:11 by Dr. Vance Segundo MD.          LAB RESULTS:      Lab 07/05/22  0552 07/04/22  0607 07/02/22  0519 06/30/22  0537   WBC  9.93 11.54* 14.81* 11.97*   HEMOGLOBIN 9.7* 9.0* 8.6* 8.1*   HEMATOCRIT 29.5* 28.6* 26.2* 25.7*   PLATELETS 336 362 349 301   NEUTROS ABS  --   --   --  7.87*   IMMATURE GRANS (ABS)  --   --   --  0.47*   LYMPHS ABS  --   --   --  2.05   MONOS ABS  --   --   --  0.83   EOS ABS  --   --   --  0.71*   MCV 88.1 89.4 88.8 92.1         Lab 07/05/22  0552 07/04/22  0607 07/02/22  0519 07/01/22  0514 06/30/22  0537   SODIUM 133* 133* 135* 136 135*   POTASSIUM 4.3 4.0 3.9 3.9 3.5   CHLORIDE 98 100 102 103 101   CO2 23.0 25.0 26.0 23.0 27.0   ANION GAP 12.0 8.0 7.0 10.0 7.0   BUN 16 17 21 23 31*   CREATININE 0.92 0.84 0.82 0.68* 0.79   EGFR 83.1 87.1 87.7 92.8 88.7   GLUCOSE 86 95 85 92 112*   CALCIUM 8.8 9.0 9.0 8.6 8.4*   MAGNESIUM  --  1.8 1.8 1.9 1.9   PHOSPHORUS  --  3.0 2.8 2.6 2.2*                         Brief Urine Lab Results  (Last result in the past 365 days)      Color   Clarity   Blood   Leuk Est   Nitrite   Protein   CREAT   Urine HCG        06/27/22 0523 Dark Yellow   Clear   Trace   Negative   Negative   Trace               Microbiology Results (last 10 days)     Procedure Component Value - Date/Time    Clostridium Difficile Toxin - Stool, Per Rectum [[id number]]  (Abnormal) Collected: 06/27/22 1639    Lab Status: Final result Specimen: Stool from Per Rectum Updated: 06/27/22 1850    Narrative:      The following orders were created for panel order Clostridium Difficile Toxin - Stool, Per Rectum.  Procedure                               Abnormality         Status                     ---------                               -----------         ------                     Clostridium Difficile To...[[id number]]  Abnormal            Final result                 Please view results for these tests on the individual orders.    Clostridium Difficile Toxin, PCR - Stool, Per Rectum [[id number]]  (Abnormal) Collected: 06/27/22 2366    Lab Status: Final result Specimen: Stool from Per Rectum Updated: 06/27/22 9340     CRain  Difficile Toxins by PCR Positive    Narrative:      Performance characteristics of test not established for patients <2 years of age.    COVID PRE-OP / PRE-PROCEDURE SCREENING ORDER (NO ISOLATION) - Swab, Nasal Cavity [[id number]]  (Normal) Collected: 06/27/22 1141    Lab Status: Final result Specimen: Swab from Nasal Cavity Updated: 06/27/22 1223    Narrative:      The following orders were created for panel order COVID PRE-OP / PRE-PROCEDURE SCREENING ORDER (NO ISOLATION) - Swab, Nasal Cavity.  Procedure                               Abnormality         Status                     ---------                               -----------         ------                     COVID-19,[facility] [employer] IN-JULI...[[id number]]  Normal              Final result                 Please view results for these tests on the individual orders.    COVID-19,[facility] [employer] IN-HOUSE,Nasal Swab No Transport Media 3-4 HR TAT - Swab, Nasal Cavity [[id number]]  (Normal) Collected: 06/27/22 1141    Lab Status: Final result Specimen: Swab from Nasal Cavity Updated: 06/27/22 1223     COVID19 Not Detected    Narrative:      Fact sheet for providers: https://www.fda.gov/media/465123/download     Fact sheet for patients: https://www.fda.gov/media/269706/download    Test performed by PCR.    Consider negative results in combination with clinical observations, patient history, and epidemiological information.  Fact sheet for providers: https://www.fda.gov/media/482218/download     Fact sheet for patients: https://www.fda.gov/media/822826/download    Test performed by PCR.    Consider negative results in combination with clinical observations, patient history, and epidemiological information.    Blood Culture With DENNISE - Blood, Blood, Central Line [[id number]]  (Normal) Collected: 06/27/22 1011    Lab Status: Final result Specimen: Blood, Central Line Updated: 07/02/22 1048     Blood Culture No growth at 5 days    Blood Culture With DENNISE - Blood, Hand, Left [[id number]]   "(Normal) Collected: 06/27/22 1001    Lab Status: Final result Specimen: Blood from Hand, Left Updated: 07/02/22 1032     Blood Culture No growth at 5 days        Chief Complaint on Day of Discharge: Adamantly refuses rehabilitation placement and wants to go home    History of Present Illness on Day of Discharge: Feeling better, tolerating current diet with some displeasure. States has ambulated without difficulty and can take care of himself at home.      Hospital Course:  The patient is a 82 y.o. male who presented to [facility] with hypotension, melena and diarrhea. RBC, FFP and platelets were transfuse and he was evaluated promptly by GI with EGD and found to have a duodenal ulcer with arterial bleeding. Attempts to control the bleeding with endoscopy were unsuccessful and he was immediately transferred to the OR. He underwent gastrotomy/duodenotomy with suture repair of the bleeding ulcer.  Post op hemoglobin has remained stable around 9, no further bleeding noted.    Also he tested positive for C diff antigen and was treated with oral vancomycin. He can complete this treatment at home. Vancomycin was switched to Flagyl due to insurance costs.     Patient agreed to consider subacute rehabilitation yesterday. He will benefit from placement for short term.     Condition on Discharge:  Stable    Physical Exam on Discharge:  /62 (BP Location: Right arm, Patient Position: Sitting)   Pulse 69   Temp 98.3 °F (36.8 °C) (Oral)   Resp 16   Ht 180.3 cm (71\")   Wt 83.1 kg (183 lb 3.2 oz)   SpO2 91%   BMI 25.55 kg/m²   Physical Exam  Constitutional:       Appearance: He is well-developed. He is not ill-appearing or diaphoretic.   HENT:      Head: Normocephalic and atraumatic.      Right Ear: External ear normal.      Left Ear: External ear normal.      Nose: Nose normal.      Mouth/Throat:      Mouth: Mucous membranes are dry.   Eyes:      General:         Right eye: No discharge.         Left eye: " No discharge.      Extraocular Movements: Extraocular movements intact.      Conjunctiva/sclera: Conjunctivae normal.      Pupils: Pupils are equal, round, and reactive to light.   Neck:      Vascular: No JVD.   Cardiovascular:      Rate and Rhythm: Regular rhythm. Tachycardia present.      Heart sounds: Normal heart sounds. No murmur heard.  Pulmonary:      Effort: Pulmonary effort is normal. No respiratory distress.      Breath sounds: Normal breath sounds. No wheezing or rales.   Chest:      Chest wall: No tenderness.   Abdominal:      General: Bowel sounds are normal. There is no distension.      Palpations: Abdomen is soft.      Tenderness: There is no abdominal tenderness. There is no guarding or rebound.   Musculoskeletal:         General: No tenderness or deformity. Normal range of motion.      Cervical back: Normal range of motion and neck supple. No rigidity.      Right lower leg: No edema.      Left lower leg: No edema.   Skin:     General: Skin is warm and dry.      Findings: No rash.   Neurological:      General: No focal deficit present.      Mental Status: He is alert and oriented to person, place, and time. Mental status is at baseline.      Cranial Nerves: No cranial nerve deficit.      Sensory: No sensory deficit.      Motor: No abnormal muscle tone.      Deep Tendon Reflexes: Reflexes normal.   Psychiatric:         Mood and Affect: Mood normal.         Behavior: Behavior normal.     Discharge Disposition:  SNF for subacute rehabilitation    Discharge Medications:     Discharge Medications      New Medications      Instructions Start Date   acetaminophen 325 MG tablet  Commonly known as: TYLENOL   650 mg, Oral, Every 6 Hours PRN      pantoprazole 40 MG EC tablet  Commonly known as: PROTONIX   40 mg, Oral, 2 Times Daily Before Meals      saccharomyces boulardii 250 MG capsule  Commonly known as: Florastor   250 mg, Oral, 2 Times Daily      vancomycin 125 MG capsule  Commonly known as: VANCOCIN   125  mg, Oral, Every 6 Hours Scheduled         Continue These Medications      Instructions Start Date   fenofibrate 145 MG tablet  Commonly known as: TRICOR   145 mg, Oral, Daily      hydroCHLOROthiazide 25 MG tablet  Commonly known as: HYDRODIURIL   25 mg, Oral, Daily      lisinopril 40 MG tablet  Commonly known as: PRINIVIL,ZESTRIL   40 mg, Oral, Daily           Discharge Diet:   Gastric soft to regular     Activity at Discharge:   Resume usual activity    Follow-up Appointments:   PCP in 2-4weeks    Electronically signed by Randy Kirk MD, 07/06/22, 13:41 CDT.    Time: 35 minutes        Electronically signed by Randy Kirk MD at 07/06/22 1350

## 2022-07-07 NOTE — THERAPY DISCHARGE NOTE
Acute Care - Occupational Therapy Discharge Summary  [facility]     Patient Name: Echo Temple  : 1940  MRN: [id number]    Today's Date: 2022                 Admit Date: 2022        OT Recommendation and Plan    Visit Dx:    ICD-10-CM ICD-9-CM   1. Hypotension, unspecified hypotension type  I95.9 458.9   2. Impaired mobility  Z74.09 799.89   3. Decreased activities of daily living (ADL)  Z78.9 V49.89   4. Dysphagia, unspecified type  R13.10 787.20                OT Rehab Goals     Row Name 22 0700             Transfer Goal 1 (OT)    Activity/Assistive Device (Transfer Goal 1, OT) transfers, all;toilet  -TS      [place] Level/Cues Needed (Transfer Goal 1, OT) independent  -TS      Time Frame (Transfer Goal 1, OT) long term goal (LTG);by discharge  -TS      Progress/Outcome (Transfer Goal 1, OT) goal not met  -TS              Bathing Goal 1 (OT)    Activity/Device (Bathing Goal 1, OT) bathing skills, all;shower chair  -TS      [place] Level/Cues Needed (Bathing Goal 1, OT) standby assist  -TS      Time Frame (Bathing Goal 1, OT) long term goal (LTG);30 days  -TS      Progress/Outcomes (Bathing Goal 1, OT) goal not met  -TS              Toileting Goal 1 (OT)    Activity/Device (Toileting Goal 1, OT) toileting skills, all  -TS      [place] Level/Cues Needed (Toileting Goal 1, OT) independent  -TS      Time Frame (Toileting Goal 1, OT) long term goal (LTG);30 days  -TS      Progress/Outcome (Toileting Goal 1, OT) goal not met  -TS            User Key  (r) = Recorded By, (t) = Taken By, (c) = Cosigned By    Initials Name Provider Type Discipline    Maddie Whaley COTA Occupational Therapist Assistant OT                 Outcome Measures     Row Name 22 1500 22 1400          How much help from another person do you currently need...    Turning from your back to your side while in flat bed without using bedrails? 4  -TB 4  -AH     Moving from lying on back to sitting  on the side of a flat bed without bedrails? 4  -TB 4  -AH     Moving to and from a bed to a chair (including a wheelchair)? 4  -TB 4  -AH     Standing up from a chair using your arms (e.g., wheelchair, bedside chair)? 4  -TB 3  -AH     Climbing 3-5 steps with a railing? 3  -TB 3  -AH     To walk in hospital room? 4  -TB 3  -AH     AM-PAC 6 Clicks Score (PT) 23  -TB 21  -AH            Functional Assessment    Outcome Measure Options AM-PAC 6 Clicks Basic Mobility (PT)  -TB AM-PAC 6 Clicks Basic Mobility (PT)  -AH           User Key  (r) = Recorded By, (t) = Taken By, (c) = Cosigned By    Initials Name Provider Type     Charlotte Romo, PTA Physical Therapist Assistant    Kingston Sanchez, TAY Physical Therapist Assistant                Timed Therapy Charges  Total Units: 1    Charges  Total Units: 1    Procedure Name Documented Minutes Units Code    HC OT SELF CARE/MGMT/TRAIN EA 15 MIN 11  1    25143 (CPT®)               Documented Minutes  Total Minutes: 11    Therapy Provided Minutes    75721 - OT Self Care/Mgmt Minutes 11                    OT Discharge Summary  Anticipated Discharge Disposition (OT): home with assist  Reason for Discharge: Discharge from facility  Outcomes Achieved: Refer to plan of care for updates on goals achieved  Discharge Destination: Home with assist      NOEL Klein  7/7/2022

## 2022-07-11 ENCOUNTER — READMISSION MANAGEMENT (OUTPATIENT)
Dept: CALL CENTER | Facility: HOSPITAL | Age: 82
End: 2022-07-11

## 2022-07-11 NOTE — OUTREACH NOTE
Sepsis Week 1 Survey    Flowsheet Row Responses   [facility] patient discharged from? [facility]   Does the patient have one of the following disease processes/diagnoses(primary or secondary)? Sepsis   Week 1 attempt successful? Yes   Call start time 1419   Call end time 1430   General alerts for this patient Please call Aprilla Abdirizak(patient's step daughter) per patient   Discharge diagnosis Hypovolemic shock present on admission due to acute blood loss anemia due to bleeding duodenal ulcer status post surgical repair   Is patient permission given to speak with other caregiver? Yes   Person spoke with today (if not patient) and relationship patient-he requested that we call his step daughter Kylie Reveles   Meds reviewed with patient/caregiver? Yes   Is the patient having any side effects they believe may be caused by any medication additions or changes? No   Does the patient have all medications related to this admission filled (includes all antibiotics, inhalers, nebulizers,steroids,etc.) No   What is keeping the patient from filling the prescriptions? --  [patient is waiting on Protonix that is coming via mail]   Nursing Interventions No intervention needed   Is the patient taking all medications as directed (includes completed medication regime)? Yes   Does the patient have a primary care provider?  Yes   Comments regarding PCP Dr. Sam   Does the patient have an appointment with their PCP within 7 days of discharge? Yes  [Step daughter states they will need to rescedule.  She was advised to do so soon.]   Has the patient kept scheduled appointments due by today? N/A   DME comments patient is using  a walker   Psychosocial issues? No   Did the patient receive a copy of their discharge instructions? Yes   Nursing interventions Reviewed instructions with patient   What is the patient's perception of their health status since discharge? Improving   Is patient/caregiver able to teach back steps to  "recovery at home? Set small, achievable goals for return to baseline health, Rest and regain strength   If the patient is a current smoker, are they able to teach back resources for cessation? Not a smoker   Is the patient/caregiver able to teach back the hierarchy of who to call/visit for symptoms/problems? PCP, Specialist, Home health nurse, Urgent Care, ED, 911 Yes   Additional teach back comments Aprilla states patient wanted HH/PT but insurance denied.  Pt has hired a family member to assist with his care.   Week 1 call completed? Yes   Wrap up additional comments Step daughter is working to reschedule PCP appt.  She reports patient is improving and is using walker.  \"He is motivated to gain his independence\".            DAVID WOODARD - Registered Nurse  "

## 2022-07-19 ENCOUNTER — DOCUMENTATION (OUTPATIENT)
Dept: CT IMAGING | Facility: HOSPITAL | Age: 82
End: 2022-07-19

## 2022-07-19 NOTE — PROGRESS NOTES
A notification letter was sent to the patient explaining that a recent imaging exam showed an incidental lung finding, for which follow-up testing was recommended.

## 2022-07-22 ENCOUNTER — HOSPITAL ENCOUNTER (EMERGENCY)
Facility: HOSPITAL | Age: 82
Discharge: HOME | End: 2022-07-22
Attending: EMERGENCY MEDICINE | Admitting: EMERGENCY MEDICINE

## 2022-07-22 ENCOUNTER — APPOINTMENT (OUTPATIENT)
Dept: GENERAL RADIOLOGY | Facility: HOSPITAL | Age: 82
End: 2022-07-22

## 2022-07-22 VITALS
HEART RATE: 80 BPM | HEIGHT: 71 IN | DIASTOLIC BLOOD PRESSURE: 92 MMHG | SYSTOLIC BLOOD PRESSURE: 130 MMHG | BODY MASS INDEX: 24.36 KG/M2 | RESPIRATION RATE: 17 BRPM | TEMPERATURE: 97.9 F | WEIGHT: 174 LBS | OXYGEN SATURATION: 98 %

## 2022-07-22 DIAGNOSIS — I95.9 HYPOTENSION, UNSPECIFIED HYPOTENSION TYPE: Primary | ICD-10-CM

## 2022-07-22 DIAGNOSIS — R05.9 COUGH: ICD-10-CM

## 2022-07-22 LAB
ABO GROUP BLD: NORMAL
ALBUMIN SERPL-MCNC: 4.2 G/DL (ref 3.5–5.2)
ALBUMIN/GLOB SERPL: 1.6 G/DL
ALP SERPL-CCNC: 53 U/L (ref 39–117)
ALT SERPL W P-5'-P-CCNC: 8 U/L (ref 1–41)
ANION GAP SERPL CALCULATED.3IONS-SCNC: 9 MMOL/L (ref 5–15)
AST SERPL-CCNC: 15 U/L (ref 1–40)
BASOPHILS # BLD AUTO: 0.02 10*3/MM3 (ref 0–0.2)
BASOPHILS NFR BLD AUTO: 0.2 % (ref 0–1.5)
BILIRUB SERPL-MCNC: 0.5 MG/DL (ref 0–1.2)
BILIRUB UR QL STRIP: NEGATIVE
BLD GP AB SCN SERPL QL: NEGATIVE
BUN SERPL-MCNC: 22 MG/DL (ref 8–23)
BUN/CREAT SERPL: 19.1 (ref 7–25)
CALCIUM SPEC-SCNC: 9.5 MG/DL (ref 8.6–10.5)
CHLORIDE SERPL-SCNC: 94 MMOL/L (ref 98–107)
CLARITY UR: CLEAR
CO2 SERPL-SCNC: 29 MMOL/L (ref 22–29)
COLOR UR: YELLOW
CREAT SERPL-MCNC: 1.15 MG/DL (ref 0.76–1.27)
CRP SERPL-MCNC: 1.06 MG/DL (ref 0–0.5)
D-LACTATE SERPL-SCNC: 1.4 MMOL/L (ref 0.5–2)
DEPRECATED RDW RBC AUTO: 46.6 FL (ref 37–54)
EGFRCR SERPLBLD CKD-EPI 2021: 63.5 ML/MIN/1.73
EOSINOPHIL # BLD AUTO: 0.16 10*3/MM3 (ref 0–0.4)
EOSINOPHIL NFR BLD AUTO: 1.9 % (ref 0.3–6.2)
ERYTHROCYTE [DISTWIDTH] IN BLOOD BY AUTOMATED COUNT: 14.3 % (ref 12.3–15.4)
GLOBULIN UR ELPH-MCNC: 2.7 GM/DL
GLUCOSE SERPL-MCNC: 96 MG/DL (ref 65–99)
GLUCOSE UR STRIP-MCNC: NEGATIVE MG/DL
HCT VFR BLD AUTO: 33.8 % (ref 37.5–51)
HGB BLD-MCNC: 10.9 G/DL (ref 13–17.7)
HGB UR QL STRIP.AUTO: NEGATIVE
IMM GRANULOCYTES # BLD AUTO: 0.05 10*3/MM3 (ref 0–0.05)
IMM GRANULOCYTES NFR BLD AUTO: 0.6 % (ref 0–0.5)
INR PPP: 1.13 (ref 0.91–1.09)
KETONES UR QL STRIP: NEGATIVE
LEUKOCYTE ESTERASE UR QL STRIP.AUTO: NEGATIVE
LYMPHOCYTES # BLD AUTO: 1.29 10*3/MM3 (ref 0.7–3.1)
LYMPHOCYTES NFR BLD AUTO: 15.2 % (ref 19.6–45.3)
MCH RBC QN AUTO: 28.8 PG (ref 26.6–33)
MCHC RBC AUTO-ENTMCNC: 32.2 G/DL (ref 31.5–35.7)
MCV RBC AUTO: 89.4 FL (ref 79–97)
MONOCYTES # BLD AUTO: 0.68 10*3/MM3 (ref 0.1–0.9)
MONOCYTES NFR BLD AUTO: 8 % (ref 5–12)
NEUTROPHILS NFR BLD AUTO: 6.28 10*3/MM3 (ref 1.7–7)
NEUTROPHILS NFR BLD AUTO: 74.1 % (ref 42.7–76)
NITRITE UR QL STRIP: NEGATIVE
NRBC BLD AUTO-RTO: 0 /100 WBC (ref 0–0.2)
PH UR STRIP.AUTO: 7.5 [PH] (ref 5–8)
PLATELET # BLD AUTO: 182 10*3/MM3 (ref 140–450)
PMV BLD AUTO: 10 FL (ref 6–12)
POTASSIUM SERPL-SCNC: 3.8 MMOL/L (ref 3.5–5.2)
PROCALCITONIN SERPL-MCNC: 0.1 NG/ML (ref 0–0.25)
PROT SERPL-MCNC: 6.9 G/DL (ref 6–8.5)
PROT UR QL STRIP: NEGATIVE
PROTHROMBIN TIME: 14.1 SECONDS (ref 11.9–14.6)
RBC # BLD AUTO: 3.78 10*6/MM3 (ref 4.14–5.8)
RH BLD: POSITIVE
SARS-COV-2 RNA PNL SPEC NAA+PROBE: NOT DETECTED
SODIUM SERPL-SCNC: 132 MMOL/L (ref 136–145)
SP GR UR STRIP: 1.01 (ref 1–1.03)
T&S EXPIRATION DATE: NORMAL
TROPONIN T SERPL-MCNC: <0.01 NG/ML (ref 0–0.03)
UROBILINOGEN UR QL STRIP: NORMAL
WBC NRBC COR # BLD: 8.48 10*3/MM3 (ref 3.4–10.8)

## 2022-07-22 PROCEDURE — 86901 BLOOD TYPING SEROLOGIC RH(D): CPT | Performed by: EMERGENCY MEDICINE

## 2022-07-22 PROCEDURE — 87040 BLOOD CULTURE FOR BACTERIA: CPT | Performed by: EMERGENCY MEDICINE

## 2022-07-22 PROCEDURE — 86850 RBC ANTIBODY SCREEN: CPT | Performed by: EMERGENCY MEDICINE

## 2022-07-22 PROCEDURE — 36415 COLL VENOUS BLD VENIPUNCTURE: CPT

## 2022-07-22 PROCEDURE — 84145 PROCALCITONIN (PCT): CPT | Performed by: EMERGENCY MEDICINE

## 2022-07-22 PROCEDURE — 85610 PROTHROMBIN TIME: CPT | Performed by: EMERGENCY MEDICINE

## 2022-07-22 PROCEDURE — 99283 EMERGENCY DEPT VISIT LOW MDM: CPT

## 2022-07-22 PROCEDURE — 86900 BLOOD TYPING SEROLOGIC ABO: CPT | Performed by: EMERGENCY MEDICINE

## 2022-07-22 PROCEDURE — 87635 SARS-COV-2 COVID-19 AMP PRB: CPT | Performed by: EMERGENCY MEDICINE

## 2022-07-22 PROCEDURE — 71045 X-RAY EXAM CHEST 1 VIEW: CPT

## 2022-07-22 PROCEDURE — 93005 ELECTROCARDIOGRAM TRACING: CPT | Performed by: EMERGENCY MEDICINE

## 2022-07-22 PROCEDURE — 84484 ASSAY OF TROPONIN QUANT: CPT | Performed by: EMERGENCY MEDICINE

## 2022-07-22 PROCEDURE — 81003 URINALYSIS AUTO W/O SCOPE: CPT | Performed by: EMERGENCY MEDICINE

## 2022-07-22 PROCEDURE — 80053 COMPREHEN METABOLIC PANEL: CPT | Performed by: EMERGENCY MEDICINE

## 2022-07-22 PROCEDURE — 86140 C-REACTIVE PROTEIN: CPT | Performed by: EMERGENCY MEDICINE

## 2022-07-22 PROCEDURE — 93010 ELECTROCARDIOGRAM REPORT: CPT | Performed by: INTERNAL MEDICINE

## 2022-07-22 PROCEDURE — 85025 COMPLETE CBC W/AUTO DIFF WBC: CPT | Performed by: EMERGENCY MEDICINE

## 2022-07-22 PROCEDURE — 83605 ASSAY OF LACTIC ACID: CPT | Performed by: EMERGENCY MEDICINE

## 2022-07-22 RX ORDER — METHYLPREDNISOLONE 4 MG/1
TABLET ORAL
Qty: 21 TABLET | Refills: 0 | Status: SHIPPED | OUTPATIENT
Start: 2022-07-22

## 2022-07-22 NOTE — ED PROVIDER NOTES
Subjective   Patient is 82-year-old who was noted to be hypotensive the gentleman is a poor historian.  Denies any chest pain or any other complaints.      History provided by:  EMS personnel  History limited by:  Dementia  Hypotension  Location:  Blood pressure doctor's office EMS gave 750 mL of LR  Severity:  Moderate  Onset quality:  Gradual  Timing:  Constant  Progression:  Resolved  Chronicity:  New  Associated symptoms: no abdominal pain, no chest pain, no congestion, no cough, no diarrhea, no ear pain, no fatigue, no fever, no headaches, no loss of consciousness, no myalgias, no nausea, no rash, no rhinorrhea, no shortness of breath, no sore throat, no vomiting and no wheezing        Review of Systems   Constitutional: Negative.  Negative for fatigue and fever.   HENT: Negative.  Negative for congestion, ear pain, rhinorrhea and sore throat.    Respiratory: Negative for cough, shortness of breath and wheezing.    Cardiovascular: Negative for chest pain.   Gastrointestinal: Negative.  Negative for abdominal distention, abdominal pain, diarrhea, nausea and vomiting.   Endocrine: Negative.    Genitourinary: Negative.    Musculoskeletal: Negative.  Negative for back pain, myalgias and neck pain.   Skin: Negative for color change, pallor and rash.   Neurological: Negative.  Negative for loss of consciousness, syncope, weakness, light-headedness, numbness and headaches.   Hematological: Negative.  Does not bruise/bleed easily.   All other systems reviewed and are negative.      Past Medical History:   Diagnosis Date   • Cancer (HCC)    • Hypertension        Allergies   Allergen Reactions   • Amoxicillin Itching   • Mobic [Meloxicam] Palpitations       Past Surgical History:   Procedure Laterality Date   • ENDOSCOPY N/A 6/20/2022    Procedure: ESOPHAGOGASTRODUODENOSCOPY WITH ANESTHESIA;  Surgeon: Rene Brice MD;  Location: [facility] ENDOSCOPY;  Service: Gastroenterology;  Laterality: N/A;  pre anemia   post  duodenal ulcer   Dr. Sam   • EXPLORATORY LAPAROTOMY N/A 6/20/2022    Procedure: LAPAROTOMY EXPLORATORY;  Surgeon: Nancy Park MD;  Location: [facility] OR;  Service: General;  Laterality: N/A;   • HEMORRHOIDECTOMY         No family history on file.    Social History     Socioeconomic History   • Marital status:    Tobacco Use   • Smoking status: Never Smoker   • Smokeless tobacco: Never Used   Substance and Sexual Activity   • Alcohol use: No   • Drug use: No   • Sexual activity: Defer           Objective   Physical Exam  Vitals and nursing note reviewed. Exam conducted with a chaperone present.   Constitutional:       General: He is not in acute distress.     Appearance: Normal appearance. He is well-developed. He is not toxic-appearing.   HENT:      Head: Normocephalic and atraumatic.      Nose: Nose normal.      Mouth/Throat:      Mouth: Mucous membranes are moist.      Pharynx: Uvula midline.   Eyes:      General: Lids are normal. Lids are everted, no foreign bodies appreciated.      Conjunctiva/sclera: Conjunctivae normal.      Pupils: Pupils are equal, round, and reactive to light.   Neck:      Vascular: Normal carotid pulses. No carotid bruit or JVD.      Trachea: Trachea and phonation normal. No tracheal deviation.   Cardiovascular:      Rate and Rhythm: Normal rate and regular rhythm.      Chest Wall: PMI is not displaced.      Pulses: Normal pulses.      Heart sounds: Normal heart sounds.     No gallop.   Pulmonary:      Effort: Pulmonary effort is normal. No tachypnea, accessory muscle usage or respiratory distress.      Breath sounds: Normal breath sounds. No stridor. No decreased breath sounds, wheezing, rhonchi or rales.   Abdominal:      General: Bowel sounds are normal. There is no distension.      Palpations: Abdomen is soft.      Tenderness: There is no abdominal tenderness.      Comments: Bamlanvimab is soft no ascites incision midline no erythema no cellulitis no induration no  tenderness.   Musculoskeletal:         General: No swelling. Normal range of motion.      Cervical back: Full passive range of motion without pain, normal range of motion and neck supple. No rigidity.      Comments: Lower extremity exam bilaterally is unremarkable.  There is no right or left calf tenderness .  There is no palpable venous cord.  No obvious difference in the size of the legs.  No pitting edema.  The dorsalis pedis and posterior tibial femoral and popliteal pulses are palpable and +2 bilaterally.  Homans sign is negative   Skin:     General: Skin is warm and dry.      Capillary Refill: Capillary refill takes less than 2 seconds.      Coloration: Skin is not jaundiced or pale.      Nails: There is no clubbing.   Neurological:      General: No focal deficit present.      Mental Status: He is alert and oriented to person, place, and time.      GCS: GCS eye subscore is 4. GCS verbal subscore is 5. GCS motor subscore is 6.      Cranial Nerves: Cranial nerves are intact. No cranial nerve deficit.      Motor: Motor function is intact.      Gait: Gait normal.      Deep Tendon Reflexes: Reflexes are normal and symmetric. Reflexes normal.   Psychiatric:         Speech: Speech normal.         Behavior: Behavior normal.         Procedures           ED Course  ED Course as of 07/22/22 1613   Fri Jul 22, 2022   1340 EKG shows normal sinus rhythm [TS]   1610 Case were discussed with the patient and he came in with reported hypotension his work-up is negative there is no evidence of melena hematochezia.  Hemoglobin is baseline chemistry within baseline.  Discussed this with him and will discharge him home COVID screen is negative [TS]   1611 He wanted the cement thing to be given to him for his cough and congestion. [TS]   1611 There is no clinical evidence of pneumonia. [TS]   1612 Well score is 0 [TS]      ED Course User Index  [TS] Yves Flores MD                                           MDM  Number of Diagnoses  or Management Options  Diagnosis management comments: Episodic hypotension no melena no hematochezia recent surgery       Amount and/or Complexity of Data Reviewed  Clinical lab tests: ordered and reviewed  Tests in the radiology section of CPT®: ordered and reviewed  Tests in the medicine section of CPT®: reviewed and ordered    Risk of Complications, Morbidity, and/or Mortality  Presenting problems: moderate  Diagnostic procedures: moderate  Management options: moderate        Final diagnoses:   Hypotension, unspecified hypotension type   Cough       ED Disposition  ED Disposition     ED Disposition   Discharge    Condition   Stable    Comment   --             No follow-up provider specified.       Medication List      New Prescriptions    methylPREDNISolone 4 MG dose pack  Commonly known as: MEDROL  Take as directed on package instructions.           Where to Get Your Medications      These medications were sent to [facility] [address] [place] [address] - [address] [place] [address] - [phone]  - [phone] [address] [address]    Phone: [phone]   · methylPREDNISolone 4 MG dose pack          Yves Flores MD  07/22/22 3555

## 2022-07-27 LAB
BACTERIA SPEC AEROBE CULT: NORMAL
BACTERIA SPEC AEROBE CULT: NORMAL

## 2022-07-30 LAB
QT INTERVAL: 354 MS
QTC INTERVAL: 418 MS

## 2023-08-02 ENCOUNTER — TELEPHONE (OUTPATIENT)
Dept: NEUROSURGERY | Age: 83
End: 2023-08-02

## 2023-08-02 NOTE — TELEPHONE ENCOUNTER
[facility] Neurosurgery New Patient Questionnaire    Diagnosis/Reason for Referral?    Abnormal electromyogram (EMG)  M54.16 (ICD-10-CM) - Radiculopathy, lumbar region    2. Who is completing questionnaire? Patient  Caregiver Family      3. Has the patient had any previous spinal/brain surgeries? NO      A. If yes, what is the name of the facility in which the surgery was performed? B. Procedure/Surgery performed? C. Who was the surgeon? D. When was the surgery? MM/YY       E. Did the patient improve after the surgery? 4. Is this a second opinion? If yes, Dr. Monika Travis would like to review patient first before making the appointment. 5. Have MRI Images been obtain within the last year? Yes  No      XR  CT     If yes, where was the imaging performed? [facility]   If yes, what part of the body? Lumbar  Cervical  Thoracic  Brain     If yes, when was it obtained? MM/YY    Note: if the scan was performed at a facility other than [facility], the disc will need to be brought to the appointment or we need to reach out to obtain the disc. A. Was the patient instructed to provide the disc? Yes   No      8. Has the patient had a NCV/EMG within the last year? Yes  No     If yes, where was it performed and date? IN MED[place]   7/5/23 Location:[facility]      9. Has the patient been to Physical Therapy? Yes  No     If yes, what location, how long attended, and last visit? Location: [facility]       Therapy Lasted: \"A FEW TIMES\"   Date of Last Visit:      8. Has the patient been to Pain Management? Yes  No     If yes, what location and last visit     Location:   Last Visit:   Is it helping?

## 2023-08-16 ENCOUNTER — OFFICE VISIT (OUTPATIENT)
Dept: NEUROSURGERY | Age: 83
End: 2023-08-16
Payer: COMMERCIAL

## 2023-08-16 VITALS
HEIGHT: 71 IN | HEART RATE: 74 BPM | BODY MASS INDEX: 25.06 KG/M2 | WEIGHT: 179 LBS | DIASTOLIC BLOOD PRESSURE: 72 MMHG | SYSTOLIC BLOOD PRESSURE: 98 MMHG

## 2023-08-16 DIAGNOSIS — R20.2 NUMBNESS AND TINGLING OF BOTH FEET: ICD-10-CM

## 2023-08-16 DIAGNOSIS — M54.42 CHRONIC MIDLINE LOW BACK PAIN WITH BILATERAL SCIATICA: ICD-10-CM

## 2023-08-16 DIAGNOSIS — R20.0 NUMBNESS AND TINGLING OF BOTH FEET: ICD-10-CM

## 2023-08-16 DIAGNOSIS — M41.80 DEXTROSCOLIOSIS: ICD-10-CM

## 2023-08-16 DIAGNOSIS — M54.41 CHRONIC MIDLINE LOW BACK PAIN WITH BILATERAL SCIATICA: ICD-10-CM

## 2023-08-16 DIAGNOSIS — M51.36 DDD (DEGENERATIVE DISC DISEASE), LUMBAR: Primary | ICD-10-CM

## 2023-08-16 DIAGNOSIS — G89.29 CHRONIC MIDLINE LOW BACK PAIN WITH BILATERAL SCIATICA: ICD-10-CM

## 2023-08-16 PROCEDURE — 1123F ACP DISCUSS/DSCN MKR DOCD: CPT | Performed by: NURSE PRACTITIONER

## 2023-08-16 PROCEDURE — 99204 OFFICE O/P NEW MOD 45 MIN: CPT | Performed by: NURSE PRACTITIONER

## 2023-08-16 RX ORDER — PANTOPRAZOLE SODIUM 40 MG/1
40 TABLET, DELAYED RELEASE ORAL DAILY
COMMUNITY
Start: 2023-05-12 | End: 2023-08-16 | Stop reason: CLARIF

## 2023-08-16 RX ORDER — FENOFIBRATE 145 MG/1
145 TABLET, COATED ORAL DAILY
COMMUNITY
Start: 2023-07-12

## 2023-08-16 RX ORDER — AMITRIPTYLINE HYDROCHLORIDE 10 MG/1
10 TABLET, FILM COATED ORAL NIGHTLY
COMMUNITY
Start: 2023-07-31 | End: 2023-08-16 | Stop reason: CLARIF

## 2023-08-16 RX ORDER — LISINOPRIL 40 MG/1
40 TABLET ORAL DAILY
COMMUNITY
Start: 2023-07-12

## 2023-08-16 RX ORDER — GABAPENTIN 300 MG/1
300 CAPSULE ORAL 2 TIMES DAILY
COMMUNITY
Start: 2023-07-17 | End: 2023-08-16 | Stop reason: CLARIF

## 2023-08-16 RX ORDER — HYDROCHLOROTHIAZIDE 25 MG/1
25 TABLET ORAL DAILY
COMMUNITY
Start: 2023-07-12

## 2023-08-16 ASSESSMENT — ENCOUNTER SYMPTOMS
GASTROINTESTINAL NEGATIVE: 1
EYES NEGATIVE: 1
RESPIRATORY NEGATIVE: 1

## 2023-08-16 NOTE — PROGRESS NOTES
Review of Systems   Constitutional: Negative. HENT: Negative. Eyes: Negative. Respiratory: Negative. Cardiovascular: Negative. Gastrointestinal: Negative. Genitourinary: Negative. Skin: Negative. Endo/Heme/Allergies: Negative. Psychiatric/Behavioral: Negative.
flexion 5/5    LEFT:   hand grasp 5/5    finger extension 5/5    bicep 5/5    triceps 5/5    deltoid 5/5      iliopsoas 5/5    knee flexor 5/5    knee extension 5/5    EHL 5/5   dorsiflexion 5/5    plantar flexion 5/5    No deficits to light touch or pinprick sensation  Reflexes are 2+ and symmetric bilateral patellar, 1+ bilateral achilles  No clonus or Hoffmans sign  No myofacial tenderness to palpation  Unsteady gait, walks with a walking stick      DATA and IMAGING:    Nursing/pcp notes, imaging, labs, and vitals reviewed. PT,OT and/or speech notes reviewed    No results found for: WBC, HGB, HCT, MCV, PLTNo results found for: NA, K, CL, CO2, BUN, CREATININE, GLUCOSE, CALCIUM, PROT, LABALBU, BILITOT, ALKPHOS, AST, ALT, LABGLOM, GFRAA, AGRATIO, GLOBNo results found for: INR, PROTIME      X-ray lumbar spine 7/3/2023  I have personally reviewed these images and my interpretation is:  Dextroscoliosis with the apex at L3  Very severe degenerative disc disease throughout the entire lumbar spine      NCS/EMG 7/03/2023 [facility]   Interpretation:  Consistent with a preganglionic root level injury involving L5 and S1 nerve roots bilaterally, Active and ongoing features  no evidence to suggest a peripheral neuropathy. ASSESSMENT:    Philbert Merlin is a 80 y.o. male with complaints of hand pain in the BLE of the entire legs. ICD-10-CM    1. DDD (degenerative disc disease), lumbar  M51.36 External Referral To Physical Therapy      2. Dextroscoliosis  M41.80 External Referral To Physical Therapy      3. Chronic midline low back pain with bilateral sciatica  M54.41 External Referral To Physical Therapy    M54.42     G89.29       4. Numbness and tingling of both feet  R20.0     R20.2           PLAN:  I have discussed and reviewed the results of the x-ray lumbar spine with Mr. Manisha Garza and his friend at length.   I explained that he does have significant degenerative disc disease throughout his entire lumbar spine along with a

## 2023-09-25 ENCOUNTER — OFFICE VISIT (OUTPATIENT)
Dept: NEUROSURGERY | Age: 83
End: 2023-09-25
Payer: COMMERCIAL

## 2023-09-25 VITALS
HEIGHT: 71 IN | SYSTOLIC BLOOD PRESSURE: 137 MMHG | DIASTOLIC BLOOD PRESSURE: 82 MMHG | BODY MASS INDEX: 25.06 KG/M2 | WEIGHT: 179 LBS | HEART RATE: 66 BPM

## 2023-09-25 DIAGNOSIS — M54.41 CHRONIC MIDLINE LOW BACK PAIN WITH BILATERAL SCIATICA: ICD-10-CM

## 2023-09-25 DIAGNOSIS — G89.29 CHRONIC MIDLINE LOW BACK PAIN WITH BILATERAL SCIATICA: ICD-10-CM

## 2023-09-25 DIAGNOSIS — M41.80 DEXTROSCOLIOSIS: ICD-10-CM

## 2023-09-25 DIAGNOSIS — M54.42 CHRONIC MIDLINE LOW BACK PAIN WITH BILATERAL SCIATICA: ICD-10-CM

## 2023-09-25 DIAGNOSIS — M51.36 DDD (DEGENERATIVE DISC DISEASE), LUMBAR: Primary | ICD-10-CM

## 2023-09-25 PROCEDURE — 1123F ACP DISCUSS/DSCN MKR DOCD: CPT | Performed by: NURSE PRACTITIONER

## 2023-09-25 PROCEDURE — 99213 OFFICE O/P EST LOW 20 MIN: CPT | Performed by: NURSE PRACTITIONER

## 2023-09-25 ASSESSMENT — ENCOUNTER SYMPTOMS
RESPIRATORY NEGATIVE: 1
EYES NEGATIVE: 1
BACK PAIN: 1
GASTROINTESTINAL NEGATIVE: 1

## 2023-09-25 NOTE — PROGRESS NOTES
Review of Systems   Constitutional: Negative. HENT: Negative. Eyes: Negative. Respiratory: Negative. Cardiovascular: Negative. Gastrointestinal: Negative. Genitourinary: Negative. Musculoskeletal:  Positive for back pain, joint pain and myalgias. Skin: Negative. Neurological:  Positive for tingling. Endo/Heme/Allergies: Negative. Psychiatric/Behavioral: Negative.
tablet Take 1 tablet by mouth daily      lisinopril (PRINIVIL;ZESTRIL) 40 MG tablet Take 1 tablet by mouth daily      fenofibrate (TRICOR) 145 MG tablet Take 1 tablet by mouth daily      doxycycline (VIBRAMYCIN) 50 MG capsule Take 50 mg by mouth 2 times daily (Patient not taking: Reported on 9/25/2023)       No current facility-administered medications for this visit. Allergies:  Amoxicillin and Meloxicam    Social History:   Social History     Tobacco Use   Smoking Status Never   Smokeless Tobacco Never     Social History     Substance and Sexual Activity   Alcohol Use Never         Family History:   Family History   Problem Relation Age of Onset    Esophageal Cancer Father     Colon Cancer Neg Hx     Colon Polyps Neg Hx     Liver Cancer Neg Hx     Liver Disease Neg Hx     Rectal Cancer Neg Hx     Stomach Cancer Neg Hx        REVIEW OF SYSTEMS:  Constitutional: Negative. HENT: Negative. Eyes: Negative. Respiratory: Negative. Cardiovascular: Negative. Gastrointestinal: Negative. Genitourinary: Negative. Skin: Negative. Endo/Heme/Allergies: Negative. Psychiatric/Behavioral: Negative. PHYSICAL EXAM:  Vitals:    09/25/23 1109   BP: 137/82   Pulse: 66     Constitutional: appears well-developed and well-nourished. Eyes - conjunctiva normal.  Pupils react to light  Ear, nose, throat - hearing intact to finger rub, No scars, masses, or lesions over external nose or ears, no atrophy oftongue  Neck- symmetric, no masses noted, no jugular vein distension  Respiration- chest wall appears symmetric, good expansion, normal effort without use of accessory muscles  Musculoskeletal - no significant wasting of muscles noted, no bony deformities, gait no gross ataxia  Extremities- no clubbing, cyanosis oredema  Skin - warm, dry, and intact. No rash, erythema, or pallor.   Psychiatric - mood, affect, and behavior appear normal.     Neurologic Examination  Awake, Alert and oriented x

## 2024-06-28 NOTE — THERAPY RE-EVALUATION
Problem: Sleep Disturbance  Goal: Adequate Sleep/Rest  Outcome: Progressing   Goal Outcome Evaluation:       Since completing the detox process, the Patient has been sleeping well at night, and the ongoing 15-minute safety checks continue without any issues.                Patient Name: Echo Temple  : 1940    MRN: [id number]                              Today's Date: 2022       Admit Date: 2022    Visit Dx:     ICD-10-CM ICD-9-CM   1. Hypotension, unspecified hypotension type  I95.9 458.9   2. Impaired mobility  Z74.09 799.89   3. Decreased activities of daily living (ADL)  Z78.9 V49.89   4. Dysphagia, unspecified type  R13.10 787.20     Patient Active Problem List   Diagnosis   • Acute blood loss anemia   • Diarrhea     Past Medical History:   Diagnosis Date   • Cancer (HCC)    • Hypertension      Past Surgical History:   Procedure Laterality Date   • ENDOSCOPY N/A 2022    Procedure: ESOPHAGOGASTRODUODENOSCOPY WITH ANESTHESIA;  Surgeon: Rene Brice MD;  Location: [facility] ENDOSCOPY;  Service: Gastroenterology;  Laterality: N/A;  pre anemia   post duodenal ulcer   Dr. Sam   • EXPLORATORY LAPAROTOMY N/A 2022    Procedure: LAPAROTOMY EXPLORATORY;  Surgeon: Nancy Park MD;  Location: [facility] OR;  Service: General;  Laterality: N/A;   • HEMORRHOIDECTOMY        General Information     Row Name 22 1320          Physical Therapy Time and Intention    Document Type re-evaluation  Pt presents to PT with hypotension and anemia due to acute blood loss.  -BRIAN (r) BRYAN (t) BRIAN (c)     Mode of Treatment physical therapy  -BRIAN (r) BRYAN (t) BRIAN (c)     Row Name 22 1320          General Information    Patient Profile Reviewed yes  -BRIAN (r) BRYAN (t) BRIAN (c)     Prior Level of Function independent:;all household mobility;ADL's;home management;driving  -BRIAN (r) BRYAN (t) BRIAN (c)     Existing Precautions/Restrictions fall  -BRIAN (r) BRYAN (t) BRIAN (c)     Barriers to Rehab medically complex  -BRIAN (r) BRYAN (t) BRIAN (c)     Row Name 22 1320          Living Environment    People in Home spouse  -BRIAN (r) BRYAN (t) BRIAN (c)     Row Name 22 1320          Home Main Entrance    Number of Stairs, Main Entrance four  -BRIAN (r) BRYAN (t) BRIAN (c)     Stair Railings, Main Entrance railings on  both sides of stairs  -BRIAN (r) BRYAN (t) BRIAN (c)     Row Name 07/06/22 1320          Stairs Within Home, Primary    Number of Stairs, Within Home, Primary none  -BRIAN (r) BRYAN (t) BRIAN (c)     Stair Railings, Within Home, Primary none  -BRIAN (r) BRYAN (t) BRIAN (c)     Row Name 07/06/22 1320          Cognition    Orientation Status (Cognition) oriented to;person;place;situation;time  -BRIAN (r) BRYAN (t) BRIAN (c)     Row Name 07/06/22 1320          Safety Issues, Functional Mobility    Impairments Affecting Function (Mobility) balance;endurance/activity tolerance;strength  -BRIAN (r) BRYAN (t) BRIAN (c)           User Key  (r) = Recorded By, (t) = Taken By, (c) = Cosigned By    Initials Name Provider Type    Ramakrishna Martinez, PT DPT Physical Therapist    Lokesh Ochoa, PT Student PT Student               Mobility     Row Name 07/06/22 1320          Bed Mobility    Rolling Left [place] (Bed Mobility) not tested  -BRIAN (r) BRYAN (t) BRIAN (c)     Rolling Right [place] (Bed Mobility) not tested  -BRIAN (r) BRYAN (t) BRIAN (c)     Scooting/Bridging [place] (Bed Mobility) not tested  -BRIAN (r) BRYAN (t) BRIAN (c)     Supine-Sit [place] (Bed Mobility) not tested  -BRIAN (r) BRYAN (t) BRIAN (c)     Sit-Supine [place] (Bed Mobility) not tested  -BRIAN (r) BRYAN (t) BRIAN (c)     Row Name 07/06/22 1320          Transfers    Comment, (Transfers) Pt starting position was in chair  -BRIAN (r) BRYAN (t) BRIAN (c)     Row Name 07/06/22 1320          Bed-Chair Transfer    Bed-Chair [place] (Transfers) not tested  -BRIAN (r) BRYAN (t) BRIAN (c)     Row Name 07/06/22 1320          Sit-Stand Transfer    Sit-Stand [place] (Transfers) standby assist;verbal cues;1 person assist  -BRIAN (r) BRYAN (t) BRIAN (c)     Assistive Device (Sit-Stand Transfers) walker, front-wheeled  -BRIAN (r) BRYAN (t) BRIAN (c)     Row Name 07/06/22 1320          Gait/Stairs (Locomotion)    [place] Level (Gait) standby assist  -BRIAN (r) BRYAN (t) BRIAN (c)     Assistive Device (Gait) walker, front-wheeled  -BRIAN (r) BRYAN (t) BRIAN (c)      Distance in Feet (Gait) 250 ft  -BRIAN (r) BRYAN (t) BRIAN (c)     Deviations/Abnormal Patterns (Gait) gait speed decreased  -BRIAN (r) BRYAN (t) BRIAN (c)     Bilateral Gait Deviations forward flexed posture  -BRIAN (r) BRYAN (t) BRIAN (c)     [place] Level (Stairs) not tested  -BRIAN (r) BRYAN (t) BRIAN (c)           User Key  (r) = Recorded By, (t) = Taken By, (c) = Cosigned By    Initials Name Provider Type    Ramakrishna Martinez, PT DPT Physical Therapist    Lokesh Ochoa, PT Student PT Student               Obj/Interventions     [facility] Name 07/06/22 1320          Range of Motion Comprehensive    General Range of Motion bilateral upper extremity ROM WFL  -BRIAN (r) BRYAN (t) BRIAN (c)     Row Name 07/06/22 1320          Strength Comprehensive (MMT)    General Manual Muscle Testing (MMT) Assessment no strength deficits identified;lower extremity strength deficits identified  -BRIAN (r) BRYAN (t) BRIAN (c)     Comment, General Manual Muscle Testing (MMT) Assessment BLE MMT strength grossly 4+/5  -BRIAN (r) BRYAN (t) BRIAN (c)     Row Name 07/06/22 1320          Balance    Balance Assessment sitting static balance;sitting dynamic balance;sit to stand dynamic balance;standing static balance;standing dynamic balance  -BRIAN (r) BRYAN (t) BRIAN (c)     Static Sitting Balance modified independence  -BRIAN (r) BRYAN (t) BRIAN (c)     Dynamic Sitting Balance supervision  -BRIAN (r) BRYAN (t) BRIAN (c)     Position, Sitting Balance unsupported;sitting in chair  -BRIAN (r) BRYAN (t) BRIAN (c)     Sit to Stand Dynamic Balance standby assist;1-person assist  -BRIAN (r) BRYAN (t) BRIAN (c)     Static Standing Balance standby assist;1-person assist  -BRIAN (r) BRYAN (t) BRIAN (c)     Dynamic Standing Balance standby assist;1-person assist  -BRIAN (r) BRYAN (t) BRIAN (c)     Position/Device Used, Standing Balance walker, front-wheeled  -BRIAN (r) BRYAN (t) BRIAN (c)     Balance Interventions sitting;standing;sit to stand;static;dynamic  -BRIAN (r) BRYAN (t) BRIAN (c)     Row Name 07/06/22 1320          Sensory Assessment (Somatosensory)    Sensory Assessment  (Somatosensory) sensation intact  -BRIAN (r) BRYAN (t) BRIAN (c)           User Key  (r) = Recorded By, (t) = Taken By, (c) = Cosigned By    Initials Name Provider Type    Ramakrishna Martinez, PT DPT Physical Therapist    Lokesh Ochoa, PT Student PT Student               Goals/Plan     Row Name 07/06/22 1320          Bed Mobility Goal 1 (PT)    Activity/Assistive Device (Bed Mobility Goal 1, PT) sit to supine;supine to sit  -BRIAN (r) BRYAN (t) BRIAN (c)     [place] Level/Cues Needed (Bed Mobility Goal 1, PT) standby assist  -BRIAN (r) BRYAN (t) BRIAN (c)     Time Frame (Bed Mobility Goal 1, PT) long term goal (LTG);10 days  -BRIAN (r) BRYAN (t) BRIAN (c)     Progress/Outcomes (Bed Mobility Goal 1, PT) goal ongoing;good progress toward goal  -BRIAN (r) BRYAN (t) BRIAN (c)     Row Name 07/06/22 1320          Transfer Goal 1 (PT)    Activity/Assistive Device (Transfer Goal 1, PT) sit-to-stand/stand-to-sit;bed-to-chair/chair-to-bed  -BRIAN (r) BRYAN (t) BRIAN (c)     [place] Level/Cues Needed (Transfer Goal 1, PT) contact guard required  -BRIAN (r) BRYAN (t) BRIAN (c)     Time Frame (Transfer Goal 1, PT) long term goal (LTG);10 days  -BRIAN (r) BRYAN (t) BRIAN (c)     Progress/Outcome (Transfer Goal 1, PT) goal met  -BRIAN (r) BRYAN (t) BRIAN (c)     Row Name 07/06/22 1320          Gait Training Goal 1 (PT)    Activity/Assistive Device (Gait Training Goal 1, PT) gait (walking locomotion);decrease fall risk;improve balance and speed;increase endurance/gait distance;increase energy conservation;assistive device use;other (see comments)  -BRIAN (r) BRYAN (t) BRIAN (c)     [place] Level (Gait Training Goal 1, PT) minimum assist (75% or more patient effort)  -BRIAN (r) BRYAN (t) BRIAN (c)     Distance (Gait Training Goal 1, PT) 25 ft  -BRIAN (r) BRYAN (t) BRIAN (c)     Time Frame (Gait Training Goal 1, PT) long term goal (LTG);10 days  -BRIAN ADAMS (r) (betty) BRIAN bay)     Progress/Outcome (Gait Training Goal 1, PT) goal met  -BRIAN navarrete (r)) BRIAN bay)     Row Name 07/06/22 1320          Therapy Assessment/Plan (PT)    Planned  "Therapy Interventions (PT) balance training;bed mobility training;gait training;home exercise program;patient/family education;postural re-education;strengthening;transfer training;stair training  -BRIAN           User Key  (r) = Recorded By, (t) = Taken By, (c) = Cosigned By    Initials Name Provider Type    Ramakrishna Martinez, PT DPT Physical Therapist    Lokesh Ochoa, PT Student PT Student               Clinical Impression     Row Name 07/06/22 1320          Pain    Pretreatment Pain Rating 0/10 - no pain  -BRIAN (r) BRYAN (t) BRIAN (c)     Pain Intervention(s) Medication (See MAR);Repositioned;Ambulation/increased activity  -BRIAN (r) BRYAN (t) BRIAN (c)     Row Name 07/06/22 1320          Plan of Care Review    Plan of Care Reviewed With patient  -BRIAN (r) BRYAN (t) BRIAN (c)     Progress improving  -BRIAN (r) BRYAN (t) BRIAN (c)     Outcome Evaluation PT Re-Eval complete. A&Ox4. Pt was in chair upon arrival and willing to participate in therapy. Pt c/o no pain and stated he \"was ready to go home at the earliest possible time\". Sensation intact. BLE MMT strength grossly 4+/5. Pt performed STS transfer with SBAx1 and stabilized with walker. Pt ambulated 250 ft in hallway with SBAx1 and walker before returning to chair to end session. No LOB or falls were observed. Pt will continue to benefit from skilled PT intervention to promote increased independence when ambulating as well as decrease falls risk/improve strength. Recommend d.c to home with home health pending progress.  -BRIAN (r) BRYAN (t) BRIAN (c)     Row Name 07/06/22 1320          Therapy Assessment/Plan (PT)    Patient/Family Therapy Goals Statement (PT) go home/increase independence and ability to ambulate  -BRIAN (r) BRYAN (t) BRIAN (c)     Rehab Potential (PT) good, to achieve stated therapy goals  -BRIAN (r) BRYAN (t) BRIAN (c)     Criteria for Skilled Interventions Met (PT) yes;skilled treatment is necessary  -BRIAN (r) BRYAN (t) BRIAN (c)     Therapy Frequency (PT) 2 times/day  -BRIAN (r) BRYAN (t) BRIAN (c)     Predicted " Duration of Therapy Intervention (PT) until d.c  -BRIAN (r) BRYAN (t) BRIAN (c)     Row Name 07/06/22 1320          Vital Signs    O2 Delivery Pre Treatment room air  -BRIAN (r) BRYAN (t) BRIAN (c)     O2 Delivery Intra Treatment room air  -BRIAN (r) BRYAN (t) BRIAN (c)     O2 Delivery Post Treatment room air  -BRIAN (r) BRYAN (t) BRIAN (c)     Pre Patient Position Sitting  -BRIAN (r) BRYAN (t) BRIAN (c)     Intra Patient Position Standing  -BRIAN (r) BRYAN (t) BRIAN (c)     Post Patient Position Sitting  -BRIAN (r) BRYAN (t) BRIAN (c)     Row Name 07/06/22 1320          Positioning and Restraints    Pre-Treatment Position in bed  -BRIAN (r) BRYAN (t) BRIAN (c)     Post Treatment Position chair  -BRIAN (r) BRYAN (t) BRIAN (c)     In Chair reclined;call light within reach;encouraged to call for assist;legs elevated  -BRIAN           User Key  (r) = Recorded By, (t) = Taken By, (c) = Cosigned By    Initials Name Provider Type    Ramakrishna Maritnez, PT DPT Physical Therapist    Lokesh Ochoa, PT Student PT Student               Outcome Measures     Row Name 07/06/22 1320          How much help from another person do you currently need...    Turning from your back to your side while in flat bed without using bedrails? 4  -BRIAN (r) BRYAN (t) BRIAN (c)     Moving from lying on back to sitting on the side of a flat bed without bedrails? 4  -BRIAN (r) BRYAN (t) BRIAN (c)     Moving to and from a bed to a chair (including a wheelchair)? 4  -BRIAN (r) BRYAN (t) BRIAN (c)     Standing up from a chair using your arms (e.g., wheelchair, bedside chair)? 4  -BRIAN (r) BRYAN (t) BRIAN (c)     Climbing 3-5 steps with a railing? 3  -BRIAN (r) BRYAN (t) BRIAN (c)     To walk in hospital room? 4  -BRIAN (r) BRYAN (t) BRIAN (c)     AM-PAC 6 Clicks Score (PT) 23  -BRIAN (r) BRYAN (t)     Highest level of mobility 7 --> Walked 25 feet or more  -BRIAN (r) BRYAN (t)           User Key  (r) = Recorded By, (t) = Taken By, (c) = Cosigned By    Initials Name Provider Type    Ramakrishna Martinez, PT DPT Physical Therapist    Lokesh Ochoa, PT Student PT Student                              Physical Therapy Education                 Title: PT OT SLP Therapies (Done)     Topic: Physical Therapy (Done)     Point: Mobility training (Done)     Learning Progress Summary           Patient Acceptance, E, VU,DU by BRYAN at 7/6/2022 1320    Comment: Pt presents to PT with hypotension and anemia with acute blood loss. Pt was educated on the benfits of ambulating to improve falls risk as well as independence with functional tasks. Recommend d.c to home with home health pending progress.    Acceptance, E, VU by  at 7/1/2022 1444    Comment: safety with mobility.  Progression with POC.  home with HH vs SNF    Acceptance, E,TB, VU by  at 6/28/2022 1411    Comment: bed mobility    Acceptance, E, VU,DU by BRYAN at 6/26/2022 1325    Comment: Pt presents to PT s/p laparotomy exploratory resulting from a bleeding duodenal ulcer. Pt was educated on the benefits of ambulation and moving around to increase independence and functional mobility. Anticipate d.c to SNF.                   Point: Home exercise program (Done)     Learning Progress Summary           Patient Acceptance, E,TB, VU by  at 6/30/2022 1323    Comment: BLE HEP                   Point: Body mechanics (Done)     Learning Progress Summary           Patient Acceptance, E,TB, VU by  at 7/4/2022 1430    Comment: upright posture with gait                   Point: Precautions (Done)     Learning Progress Summary           Patient Acceptance, E,TB, VU by  at 6/29/2022 1023    Comment: call for assist                               User Key     Initials Effective Dates Name Provider Type Discipline     06/16/21 -  Charlotte Romo PTA Physical Therapist Assistant PT    SHANNAN 12/08/16 -  Mir Coburn PTA Physical Therapist Assistant PT    BRYAN 05/04/22 -  Lokseh Sanchez PT Student PT Student PT              PT Recommendation and Plan     Plan of Care Reviewed With: patient  Progress: improving  Outcome Evaluation: PT Re-Eval complete. A&Ox4. Pt was in  "chair upon arrival and willing to participate in therapy. Pt c/o no pain and stated he \"was ready to go home at the earliest possible time\". Sensation intact. BLE MMT strength grossly 4+/5. Pt performed STS transfer with SBAx1 and stabilized with walker. Pt ambulated 250 ft in hallway with SBAx1 and walker before returning to chair to end session. No LOB or falls were observed. Pt will continue to benefit from skilled PT intervention to promote increased independence when ambulating as well as decrease falls risk/improve strength. Recommend d.c to home with home health pending progress.     Time Calculation:    PT Charges     Row Name 07/06/22 1320             Time Calculation    Start Time 1320  -BRIAN (r) BRYAN (t) BRIAN (c)      Stop Time 1351  -BRIAN (r) BRYAN (t) BRIAN (c)      Time Calculation (min) 31 min  -BRIAN (r) BRYAN (t)      PT Received On 07/06/22  -BRIAN (r) BRYAN (t) BRIAN (c)      PT Goal Re-Cert Due Date 07/16/22  -BRIAN (r) BRYAN (t) BRIAN (c)            User Key  (r) = Recorded By, (t) = Taken By, (c) = Cosigned By    Initials Name Provider Type    Ramakrishna Martinez, PT DPT Physical Therapist    Lokesh Ochoa, PT Student PT Student                  PT G-Codes  Outcome Measure Options: AM-PAC 6 Clicks Basic Mobility (PT)  AM-PAC 6 Clicks Score (PT): 23  AM-PAC 6 Clicks Score (OT): 22    Lokesh Sanchez PT Student  7/6/2022    "

## 2025-04-22 ENCOUNTER — OFFICE VISIT (OUTPATIENT)
Dept: NEUROSURGERY | Facility: CLINIC | Age: 85
End: 2025-04-22
Payer: MEDICARE

## 2025-04-22 ENCOUNTER — HOSPITAL ENCOUNTER (OUTPATIENT)
Dept: GENERAL RADIOLOGY | Facility: HOSPITAL | Age: 85
Discharge: HOME | End: 2025-04-22
Admitting: NURSE PRACTITIONER
Payer: MEDICARE

## 2025-04-22 VITALS — HEIGHT: 71 IN | WEIGHT: 178 LBS | BODY MASS INDEX: 24.92 KG/M2

## 2025-04-22 DIAGNOSIS — R26.2 TROUBLE WALKING: Primary | ICD-10-CM

## 2025-04-22 DIAGNOSIS — R20.0 BILATERAL LEG NUMBNESS: ICD-10-CM

## 2025-04-22 DIAGNOSIS — M79.605 LEG PAIN, BILATERAL: ICD-10-CM

## 2025-04-22 DIAGNOSIS — M79.604 LEG PAIN, BILATERAL: ICD-10-CM

## 2025-04-22 PROCEDURE — 99204 OFFICE O/P NEW MOD 45 MIN: CPT | Performed by: NURSE PRACTITIONER

## 2025-04-22 PROCEDURE — 1160F RVW MEDS BY RX/DR IN RCRD: CPT | Performed by: NURSE PRACTITIONER

## 2025-04-22 PROCEDURE — 72052 X-RAY EXAM NECK SPINE 6/>VWS: CPT

## 2025-04-22 PROCEDURE — 1159F MED LIST DOCD IN RCRD: CPT | Performed by: NURSE PRACTITIONER

## 2025-04-22 RX ORDER — ALBUTEROL SULFATE 90 UG/1
AEROSOL, METERED RESPIRATORY (INHALATION)
COMMUNITY
Start: 2025-02-28

## 2025-04-22 RX ORDER — PANTOPRAZOLE SODIUM 40 MG/1
40 TABLET, DELAYED RELEASE ORAL DAILY
COMMUNITY

## 2025-04-22 NOTE — PROGRESS NOTES
Chief complaint:   Chief Complaint   Patient presents with    Difficulty Walking     Pt is states he has been having some difficulty walking. Pt states when he walks his feet shuffles. Pt is walking with a cane today. [place] [place] Pt states he has not had any pain mgmt or seen a chiropractor.        Subjective     HPI: This is a 84-year-old male gentleman who was referred to us by Dr. Edmund altamirano for difficulty with ambulating and lower extremity pain with numbness.  He is here to be evaluated today.  Patient states that he is not really having a lot of back pain.  He is having difficulty with walking.  He has been using a cane cane for couple of years.  His main complaint is pain that is in his legs bilaterally with the right being worse than the left.  He says it is the whole legs and not able to determine if it is coming down his legs or coming up his legs.  He had a vascular evaluation which did not determine that it was a vascular issue with his legs.  He does admit to having a lot of shuffling with his gait.  He has not done any recent physical therapy or chiropractic care pain management injections.  He is right-hand-dominant.  Denies any tobacco, alcohol, or illicit drug use.    Review of Systems   Constitutional:  Positive for activity change.   Musculoskeletal:  Positive for back pain and gait problem.   Neurological:  Negative for weakness.   All other systems reviewed and are negative.       Past Medical History:   Diagnosis Date    Cancer     Hypertension      Past Surgical History:   Procedure Laterality Date    ENDOSCOPY N/A 6/20/2022    Procedure: ESOPHAGOGASTRODUODENOSCOPY WITH ANESTHESIA;  Surgeon: Rene Brice MD;  Location: [facility] ENDOSCOPY;  Service: Gastroenterology;  Laterality: N/A;  pre anemia   post duodenal ulcer   Dr. Sam    EXPLORATORY LAPAROTOMY N/A 6/20/2022    Procedure: LAPAROTOMY EXPLORATORY;  Surgeon: Nancy Park MD;   "Location: [facility] OR;  Service: General;  Laterality: N/A;    HEMORRHOIDECTOMY       History reviewed. No pertinent family history.  Social History     Tobacco Use    Smoking status: Never    Smokeless tobacco: Never   Vaping Use    Vaping status: Never Used   Substance Use Topics    Alcohol use: No    Drug use: No     (Not in a hospital admission)    Allergies:  Amoxicillin and Mobic [meloxicam]    Objective      Vital Signs  Ht 180.3 cm (71\")   Wt 80.7 kg (178 lb)   BMI 24.83 kg/m²     Physical Exam  Constitutional:       General: He is awake.      Appearance: Normal appearance. He is well-developed.   HENT:      Head: Normocephalic.   Eyes:      General: Lids are normal.      Extraocular Movements: Extraocular movements intact.      Conjunctiva/sclera: Conjunctivae normal.      Pupils: Pupils are equal, round, and reactive to light.   Pulmonary:      Effort: Pulmonary effort is normal.      Breath sounds: Normal breath sounds.   Musculoskeletal:         General: Normal range of motion.      Cervical back: Normal range of motion.   Skin:     General: Skin is warm.   Neurological:      Mental Status: He is alert and oriented to person, place, and time.      GCS: GCS eye subscore is 4. GCS verbal subscore is 5. GCS motor subscore is 6.      Cranial Nerves: No cranial nerve deficit.      Sensory: No sensory deficit.      Motor: Motor strength is normal.     Gait: Gait abnormal.      Deep Tendon Reflexes: Reflexes are normal and symmetric. Reflexes normal.      Comments: Walking independently with a cane  Noted to have trouble with turning around with shuffling steps   Psychiatric:         Speech: Speech normal.         Behavior: Behavior normal.         Thought Content: Thought content normal.         Neurological Exam  Mental Status  Awake and alert. Oriented to person, place and time. Oriented to person, place, and time. Speech is normal. Language is fluent with no aphasia. Attention and concentration are " normal.    Cranial Nerves  CN I: Sense of smell is normal.  CN II: Right normal visual field. Left normal visual field.  CN III, IV, VI: Extraocular movements intact bilaterally. Normal lids and orbits bilaterally. Pupils equal round and reactive to light bilaterally.  CN V: Facial sensation is normal.  CN VII:  Right: There is no facial weakness.  Left: There is no facial weakness.  CN XI: Shoulder shrug strength is normal.  CN XII: Tongue midline without atrophy or fasciculations.    Motor  Normal muscle bulk throughout. Normal muscle tone. Strength is 5/5 throughout all four extremities.    Sensory  Sensation is intact to light touch, pinprick, vibration and proprioception in all four extremities.    Reflexes  Deep tendon reflexes are 2+ and symmetric in all four extremities.    Gait   Abnormal gait.      Imaging review: No imaging        Assessment/Plan: Patient is complaining of lower extremity pain and difficulty with walking.  I did not detect any hyperreflexia or myelopathic symptoms.  I am going to have him go for x-rays of the cervical spine as well as lumbar spine with flexion and extension.  I will have him go for MRI of the thoracic and the lumbar spine.  Will set him up to do an EMG/NCS at [facility] to rule out peripheral neuropathy.  I will also set him up to do a CT scan of his head to look for ventriculomegaly to see if NPH may be playing a factor with his symptoms as well.  I will follow-up with him after after testing is completed and make further recommendation at that time.  Patient is a nonsmoker  The patient's Body mass index is 24.83 kg/m².. BMI is within normal parameters. No follow-up required.  Advance Care Planning   ACP discussion was held with the patient during this visit. Patient does not have an advance directive, information provided.  STEADI Fall Risk Assessment was completed, and patient is at HIGH risk for falls. Assessment completed on:4/22/2025        Diagnoses and all orders for this visit:    1. Trouble walking (Primary)  -     XR Spine Cervical Complete With Obli Flex Ext  -     XR Spine Lumbar Complete With Flex & Ext  -     MRI Lumbar Spine Without Contrast; Future  -     MRI Thoracic Spine Without Contrast; Future  -     CT Head Without Contrast; Future    2. Leg pain, bilateral  -     XR Spine Cervical Complete With Obli Flex Ext  -     XR Spine Lumbar Complete With Flex & Ext  -     MRI Lumbar Spine Without Contrast; Future  -     Cancel: EMG & Nerve Conduction Test; Future  -     EMG & Nerve Conduction Test; Future    3. Bilateral leg numbness  -     XR Spine Lumbar Complete With Flex & Ext  -     MRI Lumbar Spine Without Contrast; Future  -     MRI Thoracic Spine Without Contrast; Future  -     Cancel: EMG & Nerve Conduction Test; Future  -     EMG & Nerve Conduction Test; Future          I discussed the patients findings and my recommendations with patient    Ford Hale, APRN  04/22/25  13:51 CDT

## 2025-04-22 NOTE — PATIENT INSTRUCTIONS
Advance Care Planning and Advance Directives     You make decisions on a daily basis - decisions about where you want to live, your career, your home, your life. Perhaps one of the most important decisions you face is your choice for future medical care. Take time to talk with your family and your healthcare team and start planning today.  Advance Care Planning is a process that can help you:  Understand possible future healthcare decisions in light of your own experiences  Reflect on those decision in light of your goals and values  Discuss your decisions with those closest to you and the healthcare professionals that care for you  Make a plan by creating a document that reflects your wishes    Surrogate Decision Maker  In the event of a medical emergency, which has left you unable to communicate or to make your own decisions, you would need someone to make decisions for you.  It is important to discuss your preferences for medical treatment with this person while you are in good health.     Qualities of a surrogate decision maker:  Willing to take on this role and responsibility  Knows what you want for future medical care  Willing to follow your wishes even if they don't agree with them  Able to make difficult medical decisions under stressful circumstances    Advance Directives  These are legal documents you can create that will guide your healthcare team and decision maker(s) when needed. These documents can be stored in the electronic medical record.    Living Will - a legal document to guide your care if you have a terminal condition or a serious illness and are unable to communicate. States vary by statute in document names/types, but most forms may include one or more of the following:        -  Directions regarding life-prolonging treatments        -  Directions regarding artificially provided nutrition/hydration        -  Choosing a healthcare decision maker        -  Direction regarding organ/tissue  donation    Durable Power of  for Healthcare - this document names an -in-fact to make medical decisions for you, but it may also allow this person to make personal and financial decisions for you. Please seek the advice of an  if you need this type of document.    **Advance Directives are not required and no one may discriminate against you if you do not sign one.    Medical Orders  Many states allow specific forms/orders signed by your physician to record your wishes for medical treatment in your current state of health. This form, signed in personal communication with your physician, addresses resuscitation and other medical interventions that you may or may not want.      For more information or to schedule a time with a [facility] Advance Care Planning Facilitator contact: [email or web address] or call [phone] and someone will contact you directly.

## 2025-06-02 ENCOUNTER — HOSPITAL ENCOUNTER (OUTPATIENT)
Dept: CT IMAGING | Facility: HOSPITAL | Age: 85
Discharge: HOME | End: 2025-06-02
Payer: MEDICARE

## 2025-06-02 ENCOUNTER — HOSPITAL ENCOUNTER (OUTPATIENT)
Dept: MRI IMAGING | Facility: HOSPITAL | Age: 85
Discharge: HOME | End: 2025-06-02
Payer: MEDICARE

## 2025-06-02 ENCOUNTER — HOSPITAL ENCOUNTER (OUTPATIENT)
Dept: NEUROLOGY | Facility: HOSPITAL | Age: 85
Discharge: HOME | End: 2025-06-02
Payer: MEDICARE

## 2025-06-02 DIAGNOSIS — R26.2 TROUBLE WALKING: ICD-10-CM

## 2025-06-02 DIAGNOSIS — R20.0 BILATERAL LEG NUMBNESS: ICD-10-CM

## 2025-06-02 DIAGNOSIS — M79.604 LEG PAIN, BILATERAL: ICD-10-CM

## 2025-06-02 DIAGNOSIS — M79.605 LEG PAIN, BILATERAL: ICD-10-CM

## 2025-06-02 PROCEDURE — 70450 CT HEAD/BRAIN W/O DYE: CPT

## 2025-06-02 PROCEDURE — 72148 MRI LUMBAR SPINE W/O DYE: CPT

## 2025-06-02 PROCEDURE — 72146 MRI CHEST SPINE W/O DYE: CPT

## 2025-06-02 PROCEDURE — 95912 NRV CNDJ TEST 11-12 STUDIES: CPT

## 2025-06-02 PROCEDURE — 95886 MUSC TEST DONE W/N TEST COMP: CPT

## 2025-06-03 ENCOUNTER — TELEPHONE (OUTPATIENT)
Dept: NEUROSURGERY | Facility: CLINIC | Age: 85
End: 2025-06-03
Payer: MEDICARE

## 2025-06-03 NOTE — TELEPHONE ENCOUNTER
Placed a call to inform pt his niece called stating he needed a referral to get an TANISHA study done that was completed yesterday. Pt states she may have gotten this confused and he is in no rush for results. Informed pt once we get results we will give pt a call. Ended call with pt understanding and acknowledgment.

## 2025-06-03 NOTE — TELEPHONE ENCOUNTER
Caller: EBEN    Relationship: CANDELARIA Fernando call back number: [phone]    What is the medical concern/diagnosis: LEG PAIN    What specialty or service is being requested: EMG NCV TEST    What is the provider, practice or medical service name: [facility]    What is the office location:     What is the office phone number:     Any additional details: PATIENTS CANDELARIA CALLED STATING THAT [facility] IS NEEDING AN UPDATED REFERRAL TO EMG/NCT SENT    PLEASE ADVISE   THANK YOU

## 2025-06-10 ENCOUNTER — HOSPITAL ENCOUNTER (OUTPATIENT)
Dept: GENERAL RADIOLOGY | Facility: HOSPITAL | Age: 85
Discharge: HOME | End: 2025-06-10
Payer: MEDICARE

## 2025-06-10 ENCOUNTER — OFFICE VISIT (OUTPATIENT)
Dept: NEUROSURGERY | Facility: CLINIC | Age: 85
End: 2025-06-10
Payer: MEDICARE

## 2025-06-10 VITALS — BODY MASS INDEX: 25.2 KG/M2 | HEIGHT: 71 IN | WEIGHT: 180 LBS

## 2025-06-10 DIAGNOSIS — M54.16 LUMBAR RADICULOPATHY: ICD-10-CM

## 2025-06-10 DIAGNOSIS — E66.3 OVERWEIGHT WITH BODY MASS INDEX (BMI) OF 25 TO 25.9 IN ADULT: ICD-10-CM

## 2025-06-10 DIAGNOSIS — R20.0 BILATERAL LEG NUMBNESS: ICD-10-CM

## 2025-06-10 DIAGNOSIS — M48.062 LUMBAR STENOSIS WITH NEUROGENIC CLAUDICATION: ICD-10-CM

## 2025-06-10 DIAGNOSIS — R26.2 TROUBLE WALKING: Primary | ICD-10-CM

## 2025-06-10 PROCEDURE — 99213 OFFICE O/P EST LOW 20 MIN: CPT | Performed by: NURSE PRACTITIONER

## 2025-06-10 PROCEDURE — 1159F MED LIST DOCD IN RCRD: CPT | Performed by: NURSE PRACTITIONER

## 2025-06-10 PROCEDURE — 72114 X-RAY EXAM L-S SPINE BENDING: CPT

## 2025-06-10 PROCEDURE — 72082 X-RAY EXAM ENTIRE SPI 2/3 VW: CPT

## 2025-06-10 PROCEDURE — 1160F RVW MEDS BY RX/DR IN RCRD: CPT | Performed by: NURSE PRACTITIONER

## 2025-06-10 RX ORDER — FLUTICASONE PROPIONATE 50 MCG
2 SPRAY, SUSPENSION (ML) NASAL DAILY PRN
COMMUNITY

## 2025-06-10 RX ORDER — GABAPENTIN 100 MG/1
100 CAPSULE ORAL 3 TIMES DAILY
Qty: 90 CAPSULE | Refills: 2 | Status: SHIPPED | OUTPATIENT
Start: 2025-06-10

## 2025-06-10 NOTE — PATIENT INSTRUCTIONS
Advance Care Planning and Advance Directives     You make decisions on a daily basis - decisions about where you want to live, your career, your home, your life. Perhaps one of the most important decisions you face is your choice for future medical care. Take time to talk with your family and your healthcare team and start planning today.  Advance Care Planning is a process that can help you:  Understand possible future healthcare decisions in light of your own experiences  Reflect on those decision in light of your goals and values  Discuss your decisions with those closest to you and the healthcare professionals that care for you  Make a plan by creating a document that reflects your wishes    Surrogate Decision Maker  In the event of a medical emergency, which has left you unable to communicate or to make your own decisions, you would need someone to make decisions for you.  It is important to discuss your preferences for medical treatment with this person while you are in good health.     Qualities of a surrogate decision maker:  Willing to take on this role and responsibility  Knows what you want for future medical care  Willing to follow your wishes even if they don't agree with them  Able to make difficult medical decisions under stressful circumstances    Advance Directives  These are legal documents you can create that will guide your healthcare team and decision maker(s) when needed. These documents can be stored in the electronic medical record.    Living Will - a legal document to guide your care if you have a terminal condition or a serious illness and are unable to communicate. States vary by statute in document names/types, but most forms may include one or more of the following:        -  Directions regarding life-prolonging treatments        -  Directions regarding artificially provided nutrition/hydration        -  Choosing a healthcare decision maker        -  Direction regarding organ/tissue  donation    Durable Power of  for Healthcare - this document names an -in-fact to make medical decisions for you, but it may also allow this person to make personal and financial decisions for you. Please seek the advice of an  if you need this type of document.    **Advance Directives are not required and no one may discriminate against you if you do not sign one.    Medical Orders  Many states allow specific forms/orders signed by your physician to record your wishes for medical treatment in your current state of health. This form, signed in personal communication with your physician, addresses resuscitation and other medical interventions that you may or may not want.      For more information or to schedule a time with a [facility] Advance Care Planning Facilitator contact: [email or web address] or call [phone] and someone will contact you directly.

## 2025-06-10 NOTE — PROGRESS NOTES
Chief complaint:   Chief Complaint   Patient presents with    Difficulty Walking     Pt is here for 6-8wk f/u. Pt states since his last office visit his symptoms have not improved. Pt walking with a cane today.         Subjective     HPI: This is a 84-year-old male gentleman who was referred to us by Dr. Edmund altamirano for difficulty with ambulating and lower extremity pain with numbness.  He is here to be evaluated today.  Patient states that he is not really having a lot of back pain.  He is having difficulty with walking.  He has been using a cane cane for couple of years.  His main complaint is pain that is in his legs bilaterally with the right being worse than the left.  He says it is the whole legs and not able to determine if it is coming down his legs or coming up his legs.  He had a vascular evaluation which did not determine that it was a vascular issue with his legs.  He does admit to having a lot of shuffling with his gait.  He has not done any recent physical therapy or chiropractic care pain management injections.  He is right-hand-dominant.  Denies any tobacco, alcohol, or illicit drug use.     We did in the patient for x-rays of the spine as well as MRI of the thoracic and the lumbar spine and a nerve conduction study of the lower extremities and a CT scan of his head.  He is here in follow-up today.  He again does not complain of any significant back pain.  His main complaint is trouble with walking.  He says that standing can cause him to have pain in his legs.  Also does describe numbness in his legs.  His main trouble with his walking does seem to be more so whenever he is turning and he does seem to shuffle his legs with turning.  He says that he does have a cane so he can have something to lean on and when he is going through the grocery store he does lean on the cart.    Review of Systems   Musculoskeletal:  Positive for arthralgias, gait problem and myalgias. Negative for back pain.  "  Neurological:  Positive for numbness.   All other systems reviewed and are negative.        Objective      Vital Signs  Ht 180.3 cm (71\")   Wt 81.6 kg (180 lb)   BMI 25.10 kg/m²     Physical Exam  Constitutional:       General: He is awake.      Appearance: He is well-developed.   HENT:      Head: Normocephalic.   Eyes:      Extraocular Movements: Extraocular movements intact.      Pupils: Pupils are equal, round, and reactive to light.   Pulmonary:      Effort: Pulmonary effort is normal.   Musculoskeletal:         General: Normal range of motion.      Cervical back: Normal range of motion.   Skin:     General: Skin is warm.   Neurological:      Mental Status: He is alert and oriented to person, place, and time.      GCS: GCS eye subscore is 4. GCS verbal subscore is 5. GCS motor subscore is 6.      Cranial Nerves: No cranial nerve deficit.      Sensory: No sensory deficit.      Motor: Motor strength is normal.     Gait: Gait abnormal.      Deep Tendon Reflexes: Reflexes are normal and symmetric.      Comments: Walking independently with a cane.  Difficulty noted with turning   Psychiatric:         Speech: Speech normal.         Behavior: Behavior normal.         Thought Content: Thought content normal.         Neurological Exam  Mental Status  Awake and alert. Oriented to person, place and time. Oriented to person, place, and time. Speech is normal. Language is fluent with no aphasia. Attention and concentration are normal.    Cranial Nerves  CN I: Sense of smell is normal.  CN II: Right normal visual field. Left normal visual field.  CN III, IV, VI: Extraocular movements intact bilaterally. Pupils equal round and reactive to light bilaterally.  CN V: Facial sensation is normal.  CN VII:  Right: There is no facial weakness.  Left: There is no facial weakness.  CN XI: Shoulder shrug strength is normal.  CN XII: Tongue midline without atrophy or fasciculations.    Motor  Normal muscle bulk throughout. Normal " muscle tone. Strength is 5/5 throughout all four extremities.    Sensory  Sensation is intact to light touch, pinprick, vibration and proprioception in all four extremities.    Reflexes  Deep tendon reflexes are 2+ and symmetric in all four extremities.    Gait   Abnormal gait.      Imaging review: CT scan of the head that was done on June 2, 2025 does not reveal any ventriculomegaly.  No acute abnormality noted.    X-ray of the cervical spine does show disc degeneration at C5-6 and C6/7.  No abnormal motion noted in flexion or extension.      X-ray of the lumbar spine shows multilevel disc degeneration.  Slight scoliosis deformity is noted as well.  Anterior listhesis is noted of L4-5 without movement in flexion or extension.        MRI of the lumbar spine that was done on June 2, 2025 showed multilevel disc degeneration.  At L4-5 there is severe lumbar stenosis causing central canal and foraminal narrowing.  At L4-5 lumbar stenosis causing bilateral foraminal narrowing.  At L4-5 lumbar stenosis causing bilateral foraminal narrowing.  No fracture visualized.  No cord signal change.  The conus does terminate at L1.  L2-3      L3-4      L4-5      MRI of the thoracic spine did not show any significant central canal stenosis.  ALTE level disc degeneration is noted.  Thoracic kyphosis is noted.    EMG/NCS of the bilateral lower extremities that was done on June 2, 2025 shows peripheral polyneuropathy process involving both sensory and motor of the lower extremities.          Assessment/Plan: The patient is complaining mostly of leg issues with numbness and pain.  I think his problem is the stenosis in his back.  Nerve conduction study was consistent with peripheral neuropathy but from a clinical standpoint I think the lumbar stenosis is his main problem.  I am going to have him start on gabapentin to see if this will help with his leg issues.  He would like to try injections in his back.  I will get him an appointment  to come back and meet with Dr. Molina at the next available appointment for reevaluation.  He was told to call us if any further problems or concerns.    Patient is a nonsmoker  The patient's Body mass index is 25.1 kg/m².. BMI is above normal parameters. Recommendations include: educational material and nutrition counseling  Advance Care Planning   ACP discussion was held with the patient during this visit. Patient does not have an advance directive, information provided.   STEADI Fall Risk Assessment was completed, and patient is at HIGH risk for falls. Assessment completed on:4/22/2025     Diagnoses and all orders for this visit:    1. Trouble walking (Primary)  -     gabapentin (Neurontin) 100 MG capsule; Take 1 capsule by mouth 3 (Three) Times a Day.  Dispense: 90 capsule; Refill: 2    2. Bilateral leg numbness  -     Ambulatory Referral to Pain Management Clinic  -     XR Spine Scoliosis 2 or 3 Views; Future  -     gabapentin (Neurontin) 100 MG capsule; Take 1 capsule by mouth 3 (Three) Times a Day.  Dispense: 90 capsule; Refill: 2    3. Lumbar radiculopathy  -     Ambulatory Referral to Pain Management Clinic  -     XR Spine Scoliosis 2 or 3 Views; Future  -     gabapentin (Neurontin) 100 MG capsule; Take 1 capsule by mouth 3 (Three) Times a Day.  Dispense: 90 capsule; Refill: 2    4. Lumbar stenosis with neurogenic claudication  -     Ambulatory Referral to Pain Management Clinic  -     XR Spine Scoliosis 2 or 3 Views; Future    5. Overweight with body mass index (BMI) of 25 to 25.9 in adult        I discussed the patients findings and my recommendations with patient  Ford Hale, ERIC  06/10/25  12:27 CDT

## (undated) DEVICE — MAJOR DOUBLE BASIN W/GOWNS II: Brand: MEDLINE INDUSTRIES, INC.

## (undated) DEVICE — TBG SMPL FLTR LINE NASL 02/C02 A/ BX/100

## (undated) DEVICE — SUT SILK 3/0 SUTUPAK TIES 24IN SA74H

## (undated) DEVICE — NDL SCLEROTHRPY INTERJECT 25G 4 240 CLR

## (undated) DEVICE — GLV SURG SENSICARE W/ALOE PF LF SZ6 STRL

## (undated) DEVICE — 4-PORT MANIFOLD: Brand: NEPTUNE 2

## (undated) DEVICE — APPL CHLORAPREP HI/LITE 26ML ORNG

## (undated) DEVICE — CONMED SCOPE SAVER BITE BLOCK, 20X27 MM: Brand: SCOPE SAVER

## (undated) DEVICE — PROXIMATE RH ROTATING HEAD SKIN STAPLERS (35 WIDE) CONTAINS 35 STAINLESS STEEL STAPLES: Brand: PROXIMATE

## (undated) DEVICE — SENSR O2 OXIMAX FNGR A/ 18IN NONSTR

## (undated) DEVICE — PK TURNOVER RM ADV

## (undated) DEVICE — LARGE, DISPOSABLE ALEXIS O C-SECTION PROTECTOR - RETRACTOR: Brand: ALEXIS ® O C-SECTION PROTECTOR - RETRACTOR

## (undated) DEVICE — CLTH CLENS READYCLEANSE PERI CARE PK/5

## (undated) DEVICE — CUFF,BP,DISP,1 TUBE,ADULT,HP: Brand: MEDLINE

## (undated) DEVICE — PENROSE DRAIN 18 X .5" SILICONE: Brand: MEDLINE

## (undated) DEVICE — BIPOLAR ELECTROHEMOSTASIS CATHETER: Brand: INJECTION GOLD PROBE

## (undated) DEVICE — TBG PENCL TELESCP MEGADYNE SMOKE EVAC 10FT

## (undated) DEVICE — YANKAUER,BULB TIP WITH VENT: Brand: ARGYLE

## (undated) DEVICE — SUT SILK 2/0 SUTUPAK TIES 24IN SA75H

## (undated) DEVICE — PACK,UNIVERSAL,NO GOWNS: Brand: MEDLINE

## (undated) DEVICE — SUT PDS LP 1 TP1 96IN VIO PDP880GA

## (undated) DEVICE — TRAP FLD MINIVAC MEGADYNE 100ML

## (undated) DEVICE — SUT PROLN 1 CT1 30IN 8425H

## (undated) DEVICE — ELECTRD BLD EZ CLN MOD XLNG 2.75IN

## (undated) DEVICE — THE CHANNEL CLEANING BRUSH IS A NYLON FLEXI BRUSH ATTACHED TO A FLEXIBLE PLASTIC SHEATH DESIGNED TO SAFELY REMOVE DEBRIS FROM FLEXIBLE ENDOSCOPES.

## (undated) DEVICE — Device: Brand: DEFENDO AIR/WATER/SUCTION AND BIOPSY VALVE

## (undated) DEVICE — PAD MAJOR: Brand: MEDLINE INDUSTRIES, INC.

## (undated) DEVICE — GLV SURG SENSICARE W/ALOE PF LF 6.5 STRL